# Patient Record
Sex: FEMALE | Race: WHITE | Employment: OTHER | URBAN - METROPOLITAN AREA
[De-identification: names, ages, dates, MRNs, and addresses within clinical notes are randomized per-mention and may not be internally consistent; named-entity substitution may affect disease eponyms.]

---

## 2017-07-19 ENCOUNTER — HOSPITAL ENCOUNTER (OUTPATIENT)
Dept: MAMMOGRAPHY | Age: 72
Discharge: HOME | End: 2017-07-19
Attending: FAMILY MEDICINE
Payer: MEDICARE

## 2017-07-19 DIAGNOSIS — Z12.31 VISIT FOR SCREENING MAMMOGRAM: ICD-10-CM

## 2017-07-19 PROCEDURE — 77067 SCR MAMMO BI INCL CAD: CPT

## 2018-07-24 ENCOUNTER — HOSPITAL ENCOUNTER (OUTPATIENT)
Dept: MAMMOGRAPHY | Age: 73
Discharge: HOME | End: 2018-07-24
Attending: FAMILY MEDICINE
Payer: MEDICARE

## 2018-07-24 DIAGNOSIS — Z12.39 SCREENING BREAST EXAMINATION: ICD-10-CM

## 2018-07-24 PROCEDURE — 77067 SCR MAMMO BI INCL CAD: CPT

## 2019-08-05 ENCOUNTER — HOSPITAL ENCOUNTER (OUTPATIENT)
Dept: MAMMOGRAPHY | Age: 74
Discharge: HOME | End: 2019-08-05
Attending: FAMILY MEDICINE
Payer: MEDICARE

## 2019-08-05 DIAGNOSIS — Z12.39 BREAST SCREENING, UNSPECIFIED: ICD-10-CM

## 2019-08-05 PROCEDURE — 77067 SCR MAMMO BI INCL CAD: CPT

## 2020-08-06 ENCOUNTER — HOSPITAL ENCOUNTER (OUTPATIENT)
Dept: MAMMOGRAPHY | Age: 75
Discharge: HOME | End: 2020-08-06
Attending: FAMILY MEDICINE
Payer: MEDICARE

## 2020-08-06 DIAGNOSIS — Z12.31 VISIT FOR SCREENING MAMMOGRAM: ICD-10-CM

## 2020-08-06 PROCEDURE — 77067 SCR MAMMO BI INCL CAD: CPT

## 2021-07-19 ENCOUNTER — TRANSCRIBE ORDER (OUTPATIENT)
Dept: SCHEDULING | Age: 76
End: 2021-07-19

## 2021-07-19 DIAGNOSIS — Z12.31 VISIT FOR SCREENING MAMMOGRAM: Primary | ICD-10-CM

## 2021-08-12 ENCOUNTER — HOSPITAL ENCOUNTER (OUTPATIENT)
Dept: MAMMOGRAPHY | Age: 76
Discharge: HOME | End: 2021-08-12
Attending: FAMILY MEDICINE
Payer: MEDICARE

## 2021-08-12 DIAGNOSIS — Z12.31 VISIT FOR SCREENING MAMMOGRAM: ICD-10-CM

## 2021-08-12 PROCEDURE — 77067 SCR MAMMO BI INCL CAD: CPT

## 2022-07-18 ENCOUNTER — TRANSCRIBE ORDER (OUTPATIENT)
Dept: SCHEDULING | Age: 77
End: 2022-07-18

## 2022-07-18 DIAGNOSIS — Z12.31 ENCOUNTER FOR MAMMOGRAM TO ESTABLISH BASELINE MAMMOGRAM: Primary | ICD-10-CM

## 2022-08-15 ENCOUNTER — HOSPITAL ENCOUNTER (OUTPATIENT)
Dept: MAMMOGRAPHY | Age: 77
Discharge: HOME | End: 2022-08-15
Attending: FAMILY MEDICINE
Payer: MEDICARE

## 2022-08-15 DIAGNOSIS — Z12.31 ENCOUNTER FOR MAMMOGRAM TO ESTABLISH BASELINE MAMMOGRAM: ICD-10-CM

## 2022-08-15 PROCEDURE — 77067 SCR MAMMO BI INCL CAD: CPT

## 2023-08-10 ENCOUNTER — OFFICE VISIT (OUTPATIENT)
Age: 78
End: 2023-08-10
Payer: MEDICARE

## 2023-08-10 VITALS
HEIGHT: 63 IN | BODY MASS INDEX: 22.32 KG/M2 | OXYGEN SATURATION: 97 % | WEIGHT: 126 LBS | DIASTOLIC BLOOD PRESSURE: 76 MMHG | HEART RATE: 67 BPM | SYSTOLIC BLOOD PRESSURE: 138 MMHG

## 2023-08-10 DIAGNOSIS — E07.9 THYROID DISORDER: ICD-10-CM

## 2023-08-10 DIAGNOSIS — R41.3 COMPLAINTS OF MEMORY DISTURBANCE: Primary | ICD-10-CM

## 2023-08-10 PROCEDURE — 1090F PRES/ABSN URINE INCON ASSESS: CPT | Performed by: PSYCHIATRY & NEUROLOGY

## 2023-08-10 PROCEDURE — G8427 DOCREV CUR MEDS BY ELIG CLIN: HCPCS | Performed by: PSYCHIATRY & NEUROLOGY

## 2023-08-10 PROCEDURE — 4004F PT TOBACCO SCREEN RCVD TLK: CPT | Performed by: PSYCHIATRY & NEUROLOGY

## 2023-08-10 PROCEDURE — G8400 PT W/DXA NO RESULTS DOC: HCPCS | Performed by: PSYCHIATRY & NEUROLOGY

## 2023-08-10 PROCEDURE — 99204 OFFICE O/P NEW MOD 45 MIN: CPT | Performed by: PSYCHIATRY & NEUROLOGY

## 2023-08-10 PROCEDURE — 1123F ACP DISCUSS/DSCN MKR DOCD: CPT | Performed by: PSYCHIATRY & NEUROLOGY

## 2023-08-10 PROCEDURE — G8420 CALC BMI NORM PARAMETERS: HCPCS | Performed by: PSYCHIATRY & NEUROLOGY

## 2023-08-10 RX ORDER — SIMVASTATIN 20 MG
20 TABLET ORAL DAILY
COMMUNITY
Start: 2023-06-19

## 2023-08-10 RX ORDER — BUPROPION HYDROCHLORIDE 150 MG/1
TABLET ORAL
COMMUNITY
Start: 2023-07-20

## 2023-08-10 RX ORDER — FOLIC ACID 1 MG/1
1 TABLET ORAL DAILY
COMMUNITY
Start: 2019-09-14

## 2023-08-10 RX ORDER — LEVOTHYROXINE SODIUM 0.07 MG/1
TABLET ORAL
COMMUNITY
Start: 2023-05-02

## 2023-08-10 RX ORDER — METOPROLOL SUCCINATE 25 MG/1
TABLET, EXTENDED RELEASE ORAL
COMMUNITY
Start: 2023-07-20

## 2023-08-10 ASSESSMENT — PATIENT HEALTH QUESTIONNAIRE - PHQ9
SUM OF ALL RESPONSES TO PHQ QUESTIONS 1-9: 2
SUM OF ALL RESPONSES TO PHQ QUESTIONS 1-9: 2
2. FEELING DOWN, DEPRESSED OR HOPELESS: 1
SUM OF ALL RESPONSES TO PHQ QUESTIONS 1-9: 2
1. LITTLE INTEREST OR PLEASURE IN DOING THINGS: 1
SUM OF ALL RESPONSES TO PHQ QUESTIONS 1-9: 2
SUM OF ALL RESPONSES TO PHQ9 QUESTIONS 1 & 2: 2

## 2023-08-10 NOTE — PROGRESS NOTES
NEUROLOGY  NEW PATIENT EVALUATION/CONSULTATION       PATIENT NAME: Isabella Martinez    MRN: [id number]    REASON FOR CONSULTATION: Memory impairment    08/10/23      Previous records (physician notes, laboratory reports, and radiology reports) and imaging studies were reviewed and summarized. My recommendations will be communicated back to the patient's physician(s) via electronic medical record and/or by [address] mail. HISTORY OF PRESENT ILLNESS:  Isabella Martinez is a 69 y.o.  female presenting for evaluation of memory impairment. Onset and progression: 6 months    Neuropsychiatric symptoms     Problems with judgment:No   Reduced interest in hobbies/activities: No   Repeats questions, stories, or statements: No    Trouble recalling people's names: Yes   Trouble learning how to use a tool or appliance: No   Forgetting the correct month or year: No   Difficulty handling financial affairs (bill-paying, taxes): No   Difficulty remembering appointments:No    Memory: Difficulty with names, eventually comes back to her. Reports falling victim to a scam previously. Language: Occasional word finding difficulty   Change in personality:No  Change in eating habits: Decreased appetite, not losing weight  Physical changes: Denies  Depressive symptoms: None  Hallucinations/Delusions: None    Ability to function:  Driving: Yes, no difficulty with navigation  Finances:Handles independently, no issues  Cooking: Yes, no issues  Manages own medication: Yes, no issues, uses a pill box  Residing: Alone    Prior work-up: TSH 1/9/23 0.273 (L), total T3 0.62 (L)- Synthroid recently adjusted    Prior treatments: None      PAST MEDICAL HISTORY:  Past Medical History:   Diagnosis Date    Hypertension     Menopause     LMP-late 45s? Thyroid disease        PAST SURGICAL HISTORY:  History reviewed. No pertinent surgical history.     FAMILY HISTORY:   Family History   Problem Relation Age of Onset    Heart Failure Father

## 2023-08-11 LAB — VIT B12 SERPL-MCNC: 412 PG/ML (ref 232–1245)

## 2023-08-14 ENCOUNTER — TELEPHONE (OUTPATIENT)
Age: 78
End: 2023-08-14

## 2023-08-14 NOTE — TELEPHONE ENCOUNTER
Called patient. Informed her that the doctor reviewed her b12 results, \"Labs reviewed without significant abnormalities. \"

## 2023-11-09 ENCOUNTER — HOSPITAL ENCOUNTER (OUTPATIENT)
Facility: HOSPITAL | Age: 78
Discharge: HOME | End: 2023-11-09
Payer: MEDICARE

## 2023-11-09 VITALS — HEIGHT: 63 IN | WEIGHT: 130 LBS | BODY MASS INDEX: 23.04 KG/M2

## 2023-11-09 DIAGNOSIS — Z12.31 SCREENING MAMMOGRAM FOR HIGH-RISK PATIENT: ICD-10-CM

## 2023-11-09 PROCEDURE — 77067 SCR MAMMO BI INCL CAD: CPT

## 2024-01-14 ENCOUNTER — HOSPITAL ENCOUNTER (INPATIENT)
Facility: HOSPITAL | Age: 79
LOS: 6 days | Discharge: NURSING, REHAB OR LONG-TERM CARE | End: 2024-01-20
Attending: EMERGENCY MEDICINE | Admitting: FAMILY MEDICINE
Payer: MEDICARE

## 2024-01-14 ENCOUNTER — APPOINTMENT (OUTPATIENT)
Facility: HOSPITAL | Age: 79
End: 2024-01-14
Payer: MEDICARE

## 2024-01-14 DIAGNOSIS — I50.9 NEW ONSET OF CONGESTIVE HEART FAILURE (HCC): ICD-10-CM

## 2024-01-14 DIAGNOSIS — I50.42 HEART FAILURE, SYSTOLIC AND DIASTOLIC, CHRONIC (HCC): ICD-10-CM

## 2024-01-14 DIAGNOSIS — I50.43 ACUTE ON CHRONIC COMBINED SYSTOLIC (CONGESTIVE) AND DIASTOLIC (CONGESTIVE) HEART FAILURE (HCC): Primary | ICD-10-CM

## 2024-01-14 PROBLEM — E03.9 ACQUIRED HYPOTHYROIDISM: Chronic | Status: ACTIVE | Noted: 2024-01-14

## 2024-01-14 PROBLEM — R79.89 ELEVATED TROPONIN: Status: ACTIVE | Noted: 2024-01-14

## 2024-01-14 PROBLEM — I10 ESSENTIAL (PRIMARY) HYPERTENSION: Chronic | Status: ACTIVE | Noted: 2024-01-14

## 2024-01-14 PROBLEM — R74.01 TRANSAMINITIS: Status: ACTIVE | Noted: 2024-01-14

## 2024-01-14 PROBLEM — R79.89 ELEVATED BRAIN NATRIURETIC PEPTIDE (BNP) LEVEL: Status: ACTIVE | Noted: 2024-01-14

## 2024-01-14 PROBLEM — R79.89 ELEVATED D-DIMER: Status: ACTIVE | Noted: 2024-01-14

## 2024-01-14 LAB
ALBUMIN SERPL-MCNC: 3.8 G/DL (ref 3.5–5)
ALBUMIN/GLOB SERPL: 1.2 (ref 1.1–2.2)
ALP SERPL-CCNC: 73 U/L (ref 45–117)
ALT SERPL-CCNC: 164 U/L (ref 12–78)
ANION GAP SERPL CALC-SCNC: 9 MMOL/L (ref 5–15)
AST SERPL-CCNC: 254 U/L (ref 15–37)
BASOPHILS # BLD: 0 K/UL (ref 0–0.1)
BASOPHILS NFR BLD: 1 % (ref 0–1)
BILIRUB SERPL-MCNC: 1.6 MG/DL (ref 0.2–1)
BUN SERPL-MCNC: 22 MG/DL (ref 6–20)
BUN/CREAT SERPL: 14 (ref 12–20)
CALCIUM SERPL-MCNC: 9 MG/DL (ref 8.5–10.1)
CHLORIDE SERPL-SCNC: 99 MMOL/L (ref 97–108)
CO2 SERPL-SCNC: 24 MMOL/L (ref 21–32)
COMMENT:: NORMAL
CREAT SERPL-MCNC: 1.6 MG/DL (ref 0.55–1.02)
D DIMER PPP FEU-MCNC: 3.42 MG/L FEU (ref 0–0.65)
DIFFERENTIAL METHOD BLD: ABNORMAL
EOSINOPHIL # BLD: 0 K/UL (ref 0–0.4)
EOSINOPHIL NFR BLD: 0 % (ref 0–7)
ERYTHROCYTE [DISTWIDTH] IN BLOOD BY AUTOMATED COUNT: 14 % (ref 11.5–14.5)
FLUAV AG NPH QL IA: NEGATIVE
FLUBV AG NOSE QL IA: NEGATIVE
GLOBULIN SER CALC-MCNC: 3.1 G/DL (ref 2–4)
GLUCOSE SERPL-MCNC: 133 MG/DL (ref 65–100)
HCT VFR BLD AUTO: 49.8 % (ref 35–47)
HGB BLD-MCNC: 16.2 G/DL (ref 11.5–16)
IMM GRANULOCYTES # BLD AUTO: 0 K/UL (ref 0–0.04)
IMM GRANULOCYTES NFR BLD AUTO: 0 % (ref 0–0.5)
LYMPHOCYTES # BLD: 1.4 K/UL (ref 0.8–3.5)
LYMPHOCYTES NFR BLD: 19 % (ref 12–49)
MCH RBC QN AUTO: 29.7 PG (ref 26–34)
MCHC RBC AUTO-ENTMCNC: 32.5 G/DL (ref 30–36.5)
MCV RBC AUTO: 91.2 FL (ref 80–99)
MONOCYTES # BLD: 0.5 K/UL (ref 0–1)
MONOCYTES NFR BLD: 7 % (ref 5–13)
NEUTS SEG # BLD: 5.4 K/UL (ref 1.8–8)
NEUTS SEG NFR BLD: 73 % (ref 32–75)
NRBC # BLD: 0 K/UL (ref 0–0.01)
NRBC BLD-RTO: 0 PER 100 WBC
NT PRO BNP: ABNORMAL PG/ML
PLATELET # BLD AUTO: 255 K/UL (ref 150–400)
PMV BLD AUTO: 10.6 FL (ref 8.9–12.9)
POTASSIUM SERPL-SCNC: 3.8 MMOL/L (ref 3.5–5.1)
PROT SERPL-MCNC: 6.9 G/DL (ref 6.4–8.2)
RBC # BLD AUTO: 5.46 M/UL (ref 3.8–5.2)
SARS-COV-2 RDRP RESP QL NAA+PROBE: NOT DETECTED
SODIUM SERPL-SCNC: 132 MMOL/L (ref 136–145)
SOURCE: NORMAL
SPECIMEN HOLD: NORMAL
TROPONIN I SERPL HS-MCNC: 147 NG/L (ref 0–51)
TROPONIN I SERPL HS-MCNC: 177 NG/L (ref 0–51)
WBC # BLD AUTO: 7.3 K/UL (ref 3.6–11)

## 2024-01-14 PROCEDURE — 85379 FIBRIN DEGRADATION QUANT: CPT

## 2024-01-14 PROCEDURE — 85025 COMPLETE CBC W/AUTO DIFF WBC: CPT

## 2024-01-14 PROCEDURE — 6360000004 HC RX CONTRAST MEDICATION

## 2024-01-14 PROCEDURE — 80053 COMPREHEN METABOLIC PANEL: CPT

## 2024-01-14 PROCEDURE — 36415 COLL VENOUS BLD VENIPUNCTURE: CPT

## 2024-01-14 PROCEDURE — 87635 SARS-COV-2 COVID-19 AMP PRB: CPT

## 2024-01-14 PROCEDURE — 71045 X-RAY EXAM CHEST 1 VIEW: CPT

## 2024-01-14 PROCEDURE — 99285 EMERGENCY DEPT VISIT HI MDM: CPT

## 2024-01-14 PROCEDURE — 87804 INFLUENZA ASSAY W/OPTIC: CPT

## 2024-01-14 PROCEDURE — 84439 ASSAY OF FREE THYROXINE: CPT

## 2024-01-14 PROCEDURE — 84484 ASSAY OF TROPONIN QUANT: CPT

## 2024-01-14 PROCEDURE — 2060000000 HC ICU INTERMEDIATE R&B

## 2024-01-14 PROCEDURE — 6360000002 HC RX W HCPCS: Performed by: FAMILY MEDICINE

## 2024-01-14 PROCEDURE — 71275 CT ANGIOGRAPHY CHEST: CPT

## 2024-01-14 PROCEDURE — 2580000003 HC RX 258: Performed by: FAMILY MEDICINE

## 2024-01-14 PROCEDURE — 83880 ASSAY OF NATRIURETIC PEPTIDE: CPT

## 2024-01-14 PROCEDURE — 93005 ELECTROCARDIOGRAM TRACING: CPT | Performed by: HOSPITALIST

## 2024-01-14 PROCEDURE — 84443 ASSAY THYROID STIM HORMONE: CPT

## 2024-01-14 PROCEDURE — 76705 ECHO EXAM OF ABDOMEN: CPT

## 2024-01-14 RX ORDER — FUROSEMIDE 10 MG/ML
40 INJECTION INTRAMUSCULAR; INTRAVENOUS 2 TIMES DAILY
Status: DISCONTINUED | OUTPATIENT
Start: 2024-01-14 | End: 2024-01-16

## 2024-01-14 RX ORDER — SODIUM CHLORIDE 9 MG/ML
INJECTION, SOLUTION INTRAVENOUS PRN
Status: DISCONTINUED | OUTPATIENT
Start: 2024-01-14 | End: 2024-01-20 | Stop reason: HOSPADM

## 2024-01-14 RX ORDER — FOLIC ACID 1 MG/1
1 TABLET ORAL DAILY
Status: DISCONTINUED | OUTPATIENT
Start: 2024-01-15 | End: 2024-01-20 | Stop reason: HOSPADM

## 2024-01-14 RX ORDER — ATORVASTATIN CALCIUM 20 MG/1
20 TABLET, FILM COATED ORAL DAILY
Status: DISCONTINUED | OUTPATIENT
Start: 2024-01-14 | End: 2024-01-20 | Stop reason: HOSPADM

## 2024-01-14 RX ORDER — ACETAMINOPHEN 325 MG/1
650 TABLET ORAL EVERY 6 HOURS PRN
Status: DISCONTINUED | OUTPATIENT
Start: 2024-01-14 | End: 2024-01-20 | Stop reason: HOSPADM

## 2024-01-14 RX ORDER — VITAMIN B COMPLEX
2000 TABLET ORAL DAILY
Status: DISCONTINUED | OUTPATIENT
Start: 2024-01-15 | End: 2024-01-20 | Stop reason: HOSPADM

## 2024-01-14 RX ORDER — LISINOPRIL 5 MG/1
5 TABLET ORAL DAILY
Status: DISCONTINUED | OUTPATIENT
Start: 2024-01-14 | End: 2024-01-15

## 2024-01-14 RX ORDER — SODIUM CHLORIDE 0.9 % (FLUSH) 0.9 %
5-40 SYRINGE (ML) INJECTION PRN
Status: DISCONTINUED | OUTPATIENT
Start: 2024-01-14 | End: 2024-01-20 | Stop reason: HOSPADM

## 2024-01-14 RX ORDER — BUPROPION HYDROCHLORIDE 150 MG/1
150 TABLET, EXTENDED RELEASE ORAL DAILY
Status: DISCONTINUED | OUTPATIENT
Start: 2024-01-15 | End: 2024-01-20 | Stop reason: HOSPADM

## 2024-01-14 RX ORDER — POTASSIUM CHLORIDE 7.45 MG/ML
10 INJECTION INTRAVENOUS PRN
Status: DISCONTINUED | OUTPATIENT
Start: 2024-01-14 | End: 2024-01-20 | Stop reason: HOSPADM

## 2024-01-14 RX ORDER — ONDANSETRON 4 MG/1
4 TABLET, ORALLY DISINTEGRATING ORAL EVERY 8 HOURS PRN
Status: DISCONTINUED | OUTPATIENT
Start: 2024-01-14 | End: 2024-01-20 | Stop reason: HOSPADM

## 2024-01-14 RX ORDER — ENOXAPARIN SODIUM 100 MG/ML
30 INJECTION SUBCUTANEOUS EVERY 24 HOURS
Status: DISCONTINUED | OUTPATIENT
Start: 2024-01-14 | End: 2024-01-15

## 2024-01-14 RX ORDER — METOPROLOL SUCCINATE 25 MG/1
25 TABLET, EXTENDED RELEASE ORAL DAILY
Status: DISCONTINUED | OUTPATIENT
Start: 2024-01-15 | End: 2024-01-19

## 2024-01-14 RX ORDER — FUROSEMIDE 10 MG/ML
40 INJECTION INTRAMUSCULAR; INTRAVENOUS ONCE
Status: DISCONTINUED | OUTPATIENT
Start: 2024-01-14 | End: 2024-01-18

## 2024-01-14 RX ORDER — ONDANSETRON 2 MG/ML
4 INJECTION INTRAMUSCULAR; INTRAVENOUS EVERY 6 HOURS PRN
Status: DISCONTINUED | OUTPATIENT
Start: 2024-01-14 | End: 2024-01-20 | Stop reason: HOSPADM

## 2024-01-14 RX ORDER — SODIUM CHLORIDE 0.9 % (FLUSH) 0.9 %
5-40 SYRINGE (ML) INJECTION EVERY 12 HOURS SCHEDULED
Status: DISCONTINUED | OUTPATIENT
Start: 2024-01-14 | End: 2024-01-20 | Stop reason: HOSPADM

## 2024-01-14 RX ORDER — LEVOTHYROXINE SODIUM 0.07 MG/1
75 TABLET ORAL DAILY
Status: DISCONTINUED | OUTPATIENT
Start: 2024-01-15 | End: 2024-01-20 | Stop reason: HOSPADM

## 2024-01-14 RX ORDER — ACETAMINOPHEN 650 MG/1
650 SUPPOSITORY RECTAL EVERY 6 HOURS PRN
Status: DISCONTINUED | OUTPATIENT
Start: 2024-01-14 | End: 2024-01-20 | Stop reason: HOSPADM

## 2024-01-14 RX ORDER — POLYETHYLENE GLYCOL 3350 17 G/17G
17 POWDER, FOR SOLUTION ORAL DAILY PRN
Status: DISCONTINUED | OUTPATIENT
Start: 2024-01-14 | End: 2024-01-20 | Stop reason: HOSPADM

## 2024-01-14 RX ORDER — MAGNESIUM SULFATE IN WATER 40 MG/ML
2000 INJECTION, SOLUTION INTRAVENOUS PRN
Status: DISCONTINUED | OUTPATIENT
Start: 2024-01-14 | End: 2024-01-20 | Stop reason: HOSPADM

## 2024-01-14 RX ADMIN — ENOXAPARIN SODIUM 30 MG: 100 INJECTION SUBCUTANEOUS at 18:50

## 2024-01-14 RX ADMIN — SODIUM CHLORIDE, PRESERVATIVE FREE 10 ML: 5 INJECTION INTRAVENOUS at 21:43

## 2024-01-14 RX ADMIN — IOPAMIDOL: 755 INJECTION, SOLUTION INTRAVENOUS at 15:43

## 2024-01-14 RX ADMIN — FUROSEMIDE 40 MG: 10 INJECTION, SOLUTION INTRAMUSCULAR; INTRAVENOUS at 18:54

## 2024-01-14 ASSESSMENT — LIFESTYLE VARIABLES
HOW OFTEN DO YOU HAVE A DRINK CONTAINING ALCOHOL: NEVER
HOW MANY STANDARD DRINKS CONTAINING ALCOHOL DO YOU HAVE ON A TYPICAL DAY: PATIENT DOES NOT DRINK

## 2024-01-14 ASSESSMENT — PAIN - FUNCTIONAL ASSESSMENT: PAIN_FUNCTIONAL_ASSESSMENT: NONE - DENIES PAIN

## 2024-01-14 NOTE — ED PROVIDER NOTES
Cardiovascular:      Rate and Rhythm: Normal rate and regular rhythm.   Pulmonary:      Effort: Pulmonary effort is normal. No respiratory distress.      Breath sounds: Normal breath sounds.      Comments: 95% on room air  Abdominal:      General: Abdomen is flat. There is no distension.      Palpations: Abdomen is soft.   Musculoskeletal:         General: No swelling or tenderness. Normal range of motion.      Cervical back: Normal range of motion and neck supple.   Skin:     General: Skin is warm and dry.   Neurological:      General: No focal deficit present.      Mental Status: She is alert and oriented to person, place, and time. Mental status is at baseline.   Psychiatric:         Mood and Affect: Mood normal.             EMERGENCY DEPARTMENT COURSE and DIFFERENTIAL DIAGNOSIS/MDM:   Vitals:    Vitals:    01/14/24 1200 01/14/24 1230 01/14/24 1400 01/14/24 1430   BP: (!) 134/123 118/82 (!) 140/122 (!) 112/93   Pulse: 82 78 75 74   Resp: 28 17 21 20   Temp:       TempSrc:       SpO2: 91% 91% 95% 99%   Weight:       Height:             Medical Decision Making  Amount and/or Complexity of Data Reviewed  Labs: ordered.  Radiology: ordered.  ECG/medicine tests: ordered and independent interpretation performed.     Details: EKG at 11:17 AM, normal sinus rhythm with first-degree AV block T wave inversion in lateral leads, no STEMI    Risk  Prescription drug management.  Decision regarding hospitalization.            REASSESSMENT     ED Course as of 01/14/24 1625   Sun Jan 14, 2024   1400 IMPRESSION:  Cardiomegaly. Small bilateral pleural effusions with bibasilar  atelectasis.   [BN]   1624 Patient evaluated for dyspnea on exertion.  Symptoms all correlate with new onset congestive heart failure.  She had a CT of her chest to evaluate for possible pulmonary embolism.  The study was negative.  She will be ordered IV Lasix.  I have discussed her case with the inpatient hospitalist team who will admit her.  She is not  hypoxic and is comfortable at rest.  No history of prior chest pain, do not suspect acute coronary syndrome. [BN]      ED Course User Index  [BN] Juan Antonio Juarez MD         CONSULTS:  IP CONSULT TO CARDIOLOGY    PROCEDURES:     Procedures            (Please note that portions of this note were completed with a voice recognition program.  Efforts were made to edit the dictations but occasionally words are mis-transcribed.)    Juan Antonio Juarez MD (electronically signed)  Emergency Attending Physician              Juan Antonio Juarez MD  01/14/24 7081

## 2024-01-14 NOTE — ED NOTES
Spoke with cardiologist  over the phone. Cards okay with plan set by hospitalist, will see patient in the AM.

## 2024-01-14 NOTE — ED TRIAGE NOTES
Pt arrived via [facility] for c/o of weakness. Pt went to drs office this AM for weakness. EKG performed by Drs office and EMS which showed Afib. No h/o afib. Pt is alert and oriented.

## 2024-01-14 NOTE — H&P
History and Physical    Date of Service:  1/14/2024  Primary Care Provider: Heidy Perez MD  Source of information: The patient, Chart review, and Friend/caregiver    Chief Complaint: Fatigue      History of Presenting Illness:   Frannie Blake is a 78 y.o. female PMH HTN, hyperlipidemia, hypothyroid on synthroid who was sent in by PCP Heidy Perez due to ongoing symptoms past 3-4 weeks, mild edema lower extremities which she attributed to trazodone which she is now off of with some improvement (had overt rash allergic rxn) as well as worsening fatigue with no strength to walk up stairs also worsening over past 3-4 weeks. She states after 2 steps she will need to stop and rest.  States \"I just have no stamina\" This am she states she was completely breathless after 3 steos so contacted PCP and she rec she proceed to ED for eval. She also notes a couple weeks ago she was having trouble sleeping, but denies that it was do to PND and states she just couldn't sleep.  This was the reason for the trazooone, but she states its better since stopping the trazodone.  She denies chest pain, headaches, vision changes, shortness of breath at rest, nausea, vomiting, fevers, sick contacts, every time I ask her a ROS question she replies \"I just have no stamina\". She denies h/o tobacco or etoh use and denies any h/o illicit drugs as well.      REVIEW OF SYSTEMS:  Pertinent items are noted in the History of Present Illness.     Past Medical History:   Diagnosis Date    Hypertension     Menopause     LMP-late 40s?    Thyroid disease       History reviewed. No pertinent surgical history.  Prior to Admission medications    Medication Sig Start Date End Date Taking? Authorizing Provider   buPROPion (WELLBUTRIN XL) 150 MG extended release tablet TAKE 1 TABLET BY MOUTH EVERY DAY IN THE MORNING FOR 90 DAYS 7/20/23  Yes Provider, MD Adina   vitamin D (CHOLECALCIFEROL) 125 MCG (5000 UT) CAPS capsule 1 tablet  within normal limits       [unfilled]    IMAGING:   CTA CHEST W WO CONTRAST PE Eval   Final Result      1. No evidence of pulmonary embolism.   2. Moderate bilateral pleural effusions with bibasilar atelectasis.   3. Interstitial pulmonary edema.   4. Cardiomegaly.   5. Chronic pulmonary arterial hypertension.      Constellation of findings is consistent with a CHF exacerbation.         XR CHEST PORTABLE   Final Result   Cardiomegaly. Small bilateral pleural effusions with bibasilar   atelectasis.           ECG/ECHO:         Notes reviewed from all clinical/nonclinical/nursing services involved in patient's clinical care. Care coordination discussions were held with appropriate clinical/nonclinical/ nursing providers based on care coordination needs.     Assessment:   Given the patient's current clinical presentation, there is a high level of concern for decompensation if discharged from the emergency department. Complex decision making was performed, which includes reviewing the patient's available past medical records, laboratory results, and imaging studies.    Principal Problem:    Acute on chronic combined systolic (congestive) and diastolic (congestive) heart failure (HCC)  Active Problems:    Acquired hypothyroidism    Essential (primary) hypertension    Elevated brain natriuretic peptide (BNP) level    Elevated troponin    Elevated d-dimer    Transaminitis  Resolved Problems:    * No resolved hospital problems. *      Plan:     # Acute on chronic (suspected chronic diastolic and systolic due to CTA with increased pulmonary artery vasculature as well as bilateral pleural effusions noted in addition to transaminitis suggestive of congestive process)  - Lasix IV bid  - Strict input and output  - Daily weights  - Cardiology to see  - Echo pending  - Stable for telemetry  - Will cycle enzymes as troponin Is elevated  - Continue beta blocker  - Add ACEi once creat is determined to be stable  - Check TFTs  -

## 2024-01-15 ENCOUNTER — APPOINTMENT (OUTPATIENT)
Facility: HOSPITAL | Age: 79
End: 2024-01-15
Attending: FAMILY MEDICINE
Payer: MEDICARE

## 2024-01-15 ENCOUNTER — APPOINTMENT (OUTPATIENT)
Facility: HOSPITAL | Age: 79
End: 2024-01-15
Payer: MEDICARE

## 2024-01-15 LAB
ALBUMIN SERPL-MCNC: 3.1 G/DL (ref 3.5–5)
ALBUMIN/GLOB SERPL: 1.2 (ref 1.1–2.2)
ALP SERPL-CCNC: 65 U/L (ref 45–117)
ALT SERPL-CCNC: 138 U/L (ref 12–78)
ANION GAP SERPL CALC-SCNC: 10 MMOL/L (ref 5–15)
AST SERPL-CCNC: 186 U/L (ref 15–37)
BILIRUB DIRECT SERPL-MCNC: 0.5 MG/DL (ref 0–0.2)
BILIRUB SERPL-MCNC: 1.3 MG/DL (ref 0.2–1)
BUN SERPL-MCNC: 20 MG/DL (ref 6–20)
BUN/CREAT SERPL: 14 (ref 12–20)
CALCIUM SERPL-MCNC: 9.3 MG/DL (ref 8.5–10.1)
CHLORIDE SERPL-SCNC: 101 MMOL/L (ref 97–108)
CHOLEST SERPL-MCNC: 118 MG/DL
CO2 SERPL-SCNC: 25 MMOL/L (ref 21–32)
COMMENT:: NORMAL
CREAT SERPL-MCNC: 1.41 MG/DL (ref 0.55–1.02)
ECHO AV PEAK GRADIENT: 3 MMHG
ECHO AV PEAK VELOCITY: 0.9 M/S
ECHO AV VELOCITY RATIO: 0.78
ECHO EST RA PRESSURE: 3 MMHG
ECHO LA VOL A-L A4C: 46 ML (ref 22–52)
ECHO LA VOL MOD A4C: 44 ML (ref 22–52)
ECHO LA VOLUME INDEX A-L A4C: 28 ML/M2 (ref 16–34)
ECHO LA VOLUME INDEX MOD A4C: 27 ML/M2 (ref 16–34)
ECHO LV E' LATERAL VELOCITY: 5 CM/S
ECHO LV E' SEPTAL VELOCITY: 3 CM/S
ECHO LV EJECTION FRACTION A2C: 36 %
ECHO LV EJECTION FRACTION A4C: 32 %
ECHO LV FRACTIONAL SHORTENING: 26 % (ref 28–44)
ECHO LV INTERNAL DIMENSION DIASTOLE INDEX: 2.59 CM/M2
ECHO LV INTERNAL DIMENSION DIASTOLIC: 4.2 CM (ref 3.9–5.3)
ECHO LV INTERNAL DIMENSION SYSTOLIC INDEX: 1.91 CM/M2
ECHO LV INTERNAL DIMENSION SYSTOLIC: 3.1 CM
ECHO LV IVSD: 0.8 CM (ref 0.6–0.9)
ECHO LV MASS 2D: 118.7 G (ref 67–162)
ECHO LV MASS INDEX 2D: 73.3 G/M2 (ref 43–95)
ECHO LV POSTERIOR WALL DIASTOLIC: 1 CM (ref 0.6–0.9)
ECHO LV RELATIVE WALL THICKNESS RATIO: 0.48
ECHO LVOT PEAK GRADIENT: 2 MMHG
ECHO LVOT PEAK VELOCITY: 0.7 M/S
ECHO MV A VELOCITY: 0.41 M/S
ECHO MV E VELOCITY: 0.59 M/S
ECHO MV E/A RATIO: 1.44
ECHO MV E/E' LATERAL: 11.8
ECHO MV E/E' RATIO (AVERAGED): 15.73
ECHO RIGHT VENTRICULAR SYSTOLIC PRESSURE (RVSP): 43 MMHG
ECHO RV FREE WALL PEAK S': 8 CM/S
ECHO RV INTERNAL DIMENSION: 4.3 CM
ECHO RV TAPSE: 1.8 CM (ref 1.7–?)
ECHO TV REGURGITANT MAX VELOCITY: 3.17 M/S
ECHO TV REGURGITANT PEAK GRADIENT: 40 MMHG
EKG ATRIAL RATE: 150 BPM
EKG ATRIAL RATE: 55 BPM
EKG ATRIAL RATE: 88 BPM
EKG DIAGNOSIS: NORMAL
EKG P AXIS: 49 DEGREES
EKG P-R INTERVAL: 226 MS
EKG Q-T INTERVAL: 326 MS
EKG Q-T INTERVAL: 330 MS
EKG Q-T INTERVAL: 396 MS
EKG QRS DURATION: 78 MS
EKG QRS DURATION: 84 MS
EKG QRS DURATION: 94 MS
EKG QTC CALCULATION (BAZETT): 479 MS
EKG QTC CALCULATION (BAZETT): 513 MS
EKG QTC CALCULATION (BAZETT): 521 MS
EKG R AXIS: -86 DEGREES
EKG R AXIS: -89 DEGREES
EKG R AXIS: 249 DEGREES
EKG T AXIS: 135 DEGREES
EKG T AXIS: 139 DEGREES
EKG T AXIS: 238 DEGREES
EKG VENTRICULAR RATE: 149 BPM
EKG VENTRICULAR RATE: 150 BPM
EKG VENTRICULAR RATE: 88 BPM
GLOBULIN SER CALC-MCNC: 2.6 G/DL (ref 2–4)
GLUCOSE SERPL-MCNC: 83 MG/DL (ref 65–100)
HDLC SERPL-MCNC: 26 MG/DL
HDLC SERPL: 4.5 (ref 0–5)
LDLC SERPL CALC-MCNC: 75.2 MG/DL (ref 0–100)
MAGNESIUM SERPL-MCNC: 1.8 MG/DL (ref 1.6–2.4)
POTASSIUM SERPL-SCNC: 3.4 MMOL/L (ref 3.5–5.1)
PROT SERPL-MCNC: 5.7 G/DL (ref 6.4–8.2)
SODIUM SERPL-SCNC: 136 MMOL/L (ref 136–145)
SPECIMEN HOLD: NORMAL
TRIGL SERPL-MCNC: 84 MG/DL
TSH SERPL DL<=0.05 MIU/L-ACNC: 3.62 UIU/ML (ref 0.36–3.74)
VLDLC SERPL CALC-MCNC: 16.8 MG/DL

## 2024-01-15 PROCEDURE — 83735 ASSAY OF MAGNESIUM: CPT

## 2024-01-15 PROCEDURE — 80061 LIPID PANEL: CPT

## 2024-01-15 PROCEDURE — 99223 1ST HOSP IP/OBS HIGH 75: CPT | Performed by: INTERNAL MEDICINE

## 2024-01-15 PROCEDURE — 93005 ELECTROCARDIOGRAM TRACING: CPT | Performed by: NURSE PRACTITIONER

## 2024-01-15 PROCEDURE — 51798 US URINE CAPACITY MEASURE: CPT

## 2024-01-15 PROCEDURE — 6360000002 HC RX W HCPCS: Performed by: NURSE PRACTITIONER

## 2024-01-15 PROCEDURE — 93306 TTE W/DOPPLER COMPLETE: CPT

## 2024-01-15 PROCEDURE — 93306 TTE W/DOPPLER COMPLETE: CPT | Performed by: SPECIALIST

## 2024-01-15 PROCEDURE — 6360000002 HC RX W HCPCS: Performed by: FAMILY MEDICINE

## 2024-01-15 PROCEDURE — 80076 HEPATIC FUNCTION PANEL: CPT

## 2024-01-15 PROCEDURE — 6370000000 HC RX 637 (ALT 250 FOR IP): Performed by: FAMILY MEDICINE

## 2024-01-15 PROCEDURE — 84443 ASSAY THYROID STIM HORMONE: CPT

## 2024-01-15 PROCEDURE — 2060000000 HC ICU INTERMEDIATE R&B

## 2024-01-15 PROCEDURE — 80048 BASIC METABOLIC PNL TOTAL CA: CPT

## 2024-01-15 PROCEDURE — 36415 COLL VENOUS BLD VENIPUNCTURE: CPT

## 2024-01-15 PROCEDURE — 70450 CT HEAD/BRAIN W/O DYE: CPT

## 2024-01-15 PROCEDURE — 2580000003 HC RX 258: Performed by: FAMILY MEDICINE

## 2024-01-15 PROCEDURE — 2580000003 HC RX 258: Performed by: NURSE PRACTITIONER

## 2024-01-15 PROCEDURE — 99223 1ST HOSP IP/OBS HIGH 75: CPT | Performed by: SPECIALIST

## 2024-01-15 PROCEDURE — 6370000000 HC RX 637 (ALT 250 FOR IP): Performed by: NURSE PRACTITIONER

## 2024-01-15 RX ORDER — DIGOXIN 0.25 MG/ML
250 INJECTION INTRAMUSCULAR; INTRAVENOUS ONCE
Status: COMPLETED | OUTPATIENT
Start: 2024-01-15 | End: 2024-01-15

## 2024-01-15 RX ORDER — MAGNESIUM SULFATE 1 G/100ML
1000 INJECTION INTRAVENOUS ONCE
Status: COMPLETED | OUTPATIENT
Start: 2024-01-15 | End: 2024-01-15

## 2024-01-15 RX ORDER — POTASSIUM CHLORIDE 750 MG/1
10 TABLET, FILM COATED, EXTENDED RELEASE ORAL ONCE
Status: COMPLETED | OUTPATIENT
Start: 2024-01-15 | End: 2024-01-15

## 2024-01-15 RX ORDER — DIGOXIN 0.25 MG/ML
250 INJECTION INTRAMUSCULAR; INTRAVENOUS ONCE
Status: DISCONTINUED | OUTPATIENT
Start: 2024-01-15 | End: 2024-01-15

## 2024-01-15 RX ORDER — ENOXAPARIN SODIUM 100 MG/ML
1 INJECTION SUBCUTANEOUS EVERY 24 HOURS
Status: DISCONTINUED | OUTPATIENT
Start: 2024-01-15 | End: 2024-01-16

## 2024-01-15 RX ORDER — POTASSIUM CHLORIDE 750 MG/1
20 TABLET, FILM COATED, EXTENDED RELEASE ORAL
Status: COMPLETED | OUTPATIENT
Start: 2024-01-15 | End: 2024-01-15

## 2024-01-15 RX ADMIN — POTASSIUM CHLORIDE 20 MEQ: 750 TABLET, FILM COATED, EXTENDED RELEASE ORAL at 12:11

## 2024-01-15 RX ADMIN — FUROSEMIDE 40 MG: 10 INJECTION, SOLUTION INTRAMUSCULAR; INTRAVENOUS at 17:54

## 2024-01-15 RX ADMIN — SODIUM CHLORIDE, PRESERVATIVE FREE 10 ML: 5 INJECTION INTRAVENOUS at 09:35

## 2024-01-15 RX ADMIN — ATORVASTATIN CALCIUM 20 MG: 20 TABLET, FILM COATED ORAL at 09:34

## 2024-01-15 RX ADMIN — Medication 2000 UNITS: at 09:33

## 2024-01-15 RX ADMIN — AMIODARONE HYDROCHLORIDE 1 MG/MIN: 50 INJECTION, SOLUTION INTRAVENOUS at 14:11

## 2024-01-15 RX ADMIN — ENOXAPARIN SODIUM 60 MG: 100 INJECTION SUBCUTANEOUS at 17:54

## 2024-01-15 RX ADMIN — FOLIC ACID 1 MG: 1 TABLET ORAL at 09:34

## 2024-01-15 RX ADMIN — FUROSEMIDE 40 MG: 10 INJECTION, SOLUTION INTRAMUSCULAR; INTRAVENOUS at 09:34

## 2024-01-15 RX ADMIN — LEVOTHYROXINE SODIUM 75 MCG: 0.07 TABLET ORAL at 06:57

## 2024-01-15 RX ADMIN — METOPROLOL SUCCINATE 25 MG: 25 TABLET, EXTENDED RELEASE ORAL at 09:34

## 2024-01-15 RX ADMIN — BUPROPION HYDROCHLORIDE 150 MG: 150 TABLET, FILM COATED, EXTENDED RELEASE ORAL at 09:34

## 2024-01-15 RX ADMIN — DIGOXIN 250 MCG: 0.25 INJECTION INTRAMUSCULAR; INTRAVENOUS at 17:54

## 2024-01-15 RX ADMIN — AMIODARONE HYDROCHLORIDE 150 MG: 50 INJECTION, SOLUTION INTRAVENOUS at 15:17

## 2024-01-15 RX ADMIN — SODIUM CHLORIDE, PRESERVATIVE FREE 10 ML: 5 INJECTION INTRAVENOUS at 20:37

## 2024-01-15 RX ADMIN — POTASSIUM CHLORIDE 10 MEQ: 750 TABLET, FILM COATED, EXTENDED RELEASE ORAL at 17:54

## 2024-01-15 RX ADMIN — DIGOXIN 250 MCG: 0.25 INJECTION INTRAMUSCULAR; INTRAVENOUS at 14:12

## 2024-01-15 RX ADMIN — AMIODARONE HYDROCHLORIDE 0.5 MG/MIN: 50 INJECTION, SOLUTION INTRAVENOUS at 21:24

## 2024-01-15 RX ADMIN — MAGNESIUM SULFATE HEPTAHYDRATE 1000 MG: 1 INJECTION, SOLUTION INTRAVENOUS at 12:11

## 2024-01-15 NOTE — NURSE NAVIGATOR
HEART FAILURE NURSE NAVIGATOR NOTE  [facility]    Patient chart was reviewed by Heart Failure (HF) Nurse Navigators for compliance of prescribed treatment with guidelines directed medical therapy (GDMT) and HF database completed.     Please, review beneath recommendations for symptomatic patients with HF with Reduced Ejection Fraction ? 40% (HFrEF)* and patients whose LVEF improved > 40% (HFimpEF)* for your consideration when taking care of this patient.    Current Medical Therapy:    Name Frannie LARSEN 1945   LVEF   15/20 %   NYHA Functional Class   Documentation needed   ARNi/ACEi/ARB   Lisinopril   Beta-blocker  Toprol XL   Aldosterone Antagonist   Not prescribed see recommendation below.  Documentation needed   SGLT2 inhibitor  Not prescribed see recommendation below.  Documentation needed   Hydralazine/Isosorbide Dinitrate    Consulting Cardiologist:   See recommendation below severely reduced EF     Recommendations:    Please, add the following GDMT for HFrEF ? 40% [Class 1] or document in discharge summary/progress note why patient cannot take the medication:  ARNi/ACEi or ARB  Beta-blockers (carvedilol, sustained-release metoprolol succinate or bisoprolol)  Aldosterone antagonists GFR > 30 and K< 5  SGLT2 inhibitor  Hydralazine/Isosorbide dinitrate for  Americans with Class III/IV symptoms  Adjust diuretic dose at discharge if hospitalized for volume overload    Consider adding the following GDMT for HFrEF ? 40%, if appropriate [Class 2b]:  Ivabradine for patients on maximally tolerated beta-blocker dose in order to achieve HR 70-80bpm  Digoxin, goal level 0.5-0.9  Polyunsaturated fatty acids  Vericuguat  For patient with hyperkalemia while on RAASi > 5.5, consider adding potassium binders (patiromer, sodium zirconium cycosilicate)    Note: the following medications may be potentially harmful in heart failure [Class 3]:  Calcium channel blockers

## 2024-01-15 NOTE — PLAN OF CARE
Problem: Safety - Adult  Goal: Free from fall injury  Outcome: Progressing     Problem: Discharge Planning  Goal: Discharge to home or other facility with appropriate resources  Outcome: Progressing  Flowsheets (Taken 1/15/2024 0204)  Discharge to home or other facility with appropriate resources: Identify barriers to discharge with patient and caregiver     Problem: Chronic Conditions and Co-morbidities  Goal: Patient's chronic conditions and co-morbidity symptoms are monitored and maintained or improved  Outcome: Progressing

## 2024-01-15 NOTE — CONSULTS
ADVANCED HE[facility] FAILURE CENTER  [facility] in [place], [place]  Inpatient Progress Note      Patient name: Frannie Blake  Patient : 1945  Patient MRN: [id number]  Consulting MD: Nick De Oliveira MD  Date of service: 01/15/24    REASON FOR REFERRAL:  Management of heart failure    ASSESSMENT:  Frannie Blake is a 78 y.o. female admitted with acute systolic heart failure and new onset A-flutter with RVR. Presenting with NYHA class IV symptoms    RECOMMENDATIONS:  Medical Therapy for Heart Failure  Beta-blocker: Continue Toprol XL 25 mg daily. Will titrate as tolerated for rate control.   ACEi/ARB/ARNI: ACEi held, plan to transition to ARNi in 48 hours as BP tolerates  MRA: Will add Spironolactone as tolerated  SGLT2i: Plan to add Jardiance prior to discharge   Other HF drugs: Getting Digoxin Load    Volume Management  Continue Lasix 40 mg IV BID; goal net negative 2 liters per day (target weight 130 lbs)  Keep K>4 and Mg>2  Fluid restriction to 2000 cc daily, strict I/Os, daily standing weight    A-flutter with RVR  Amiodarone bolus and drip started  Anticoagulation with Lovenox 1 mg/kg q12 hours    Lipid Lowering Therapy  LDL 75.2  Continue Atorvastatin 20 mg daily    Laboratory and Imaging Studies  Echo reviewed  ECG reviewed  Labs: CMP, NT pro-BNP daily. Check vitamin D level, gammopathy profile, uric acid, A1c    Physical activity and education  Ambulate daily  Heart failure education by nurse navigator  Advanced care plan and document POA    Plans at or after hospital discharge  [facility] on discharge  Schedule sleep study  Follow-up in [facility]HF Clinic within 7 days from discharge      HISTORY OF PRESENT ILLNESS:  I had the pleasure of seeing Frannie Blake at the request of Dr. Mcguire for evaluation and management of heart failure.    Frannie Blake is a 78 y.o. female with a history of HTN, HLD and hypothyroidism. She has been generally healthy her entire life. She is a non smoker and  [phone]

## 2024-01-15 NOTE — PROGRESS NOTES
Lovenox Monitoring  Indication:  atrial flutter  Recent Labs     01/14/24  1134   HGB 16.2*        Current Weight: 61..5 kg  Est. CrCl = 29 ml/min  Current Dose: 60 mg subcutaneously every 12 hours.  Plan: Change to 60 q24hr for creatinine clearance <30 ml/min per protocol

## 2024-01-15 NOTE — PROGRESS NOTES
Spiritual Care Assessment/Progress Note  [facility]    Name: Frannie Blake MRN: [id number]    Age: 78 y.o.     Sex: female   Language: English     Date: 1/15/2024            Total Time Calculated: 24 min              Spiritual Assessment begun in [facility] 4 CV SERVICES UNIT  Service Provided For:: Patient  Referral/Consult From:: Patient  Encounter Overview/Reason : Spiritual/Emotional Needs, Grief, Loss, and Adjustments    Spiritual beliefs:      [x] Involved in a dionicio tradition/spiritual practice: Mandaeism     [x] Supported by a dionicio community: [facility]     [] Claims no spiritual orientation:      [] Seeking spiritual identity:           [] Adheres to an individual form of spirituality:      [] Not able to assess:                Identified resources for coping and support system:   Support System: Family members, [employer]ian/dionicio community       [x] Prayer                  [] Devotional reading               [] Music                  [] Guided Imagery     [] Pet visits                                        [] Other: (COMMENT)     Specific area/focus of visit   Encounter:    Crisis:    Spiritual/Emotional needs:    Ritual, Rites and Sacraments:    Grief, Loss, and Adjustments: Type: New Diagnosis  Ethics/Mediation:    Behavioral Health:    Palliative Care:    Advance Care Planning:      Plan/Referrals: Referred to (Comment) (Unit  team for follow up)    Narrative:     Responded to camila bowden requested  visit after learning of new CHF dx.     I visited with Ms. Blake. Her neighbor and niece Sonia were both present. Ms. Blake shared that she was feeling anxious about her heart condition. She expressed that she's struggling with her emotions and giving things over to God. Ms. Blake shared that she has a strong Jew dionicio. She attends [employer] and is active in many ministries. Her nieceBrenda is a hospice social worker and is assisting with coordinating ministry

## 2024-01-15 NOTE — PROGRESS NOTES
[facility]  Hospitalist Group                                                                                          Hospitalist Progress Note  Nick De Oliveira MD  Office Phone: [phone]        Date of Service:  1/15/2024  NAME:  Frannie Blake  :  1945  MRN:  [id number]       Admission Summary:   Frannie Blake is a 78 y.o. female PMH HTN, hyperlipidemia, hypothyroid on synthroid who was sent in by PCP Heidy Perez due to ongoing symptoms past 3-4 weeks, mild edema lower extremities which she attributed to trazodone which she is now off of with some improvement (had overt rash allergic rxn) as well as worsening fatigue with no strength to walk up stairs also worsening over past 3-4 weeks. She states after 2 steps she will need to stop and rest.  States \"I just have no stamina\" This am she states she was completely breathless after 3 steos so contacted PCP and she rec she proceed to ED for eval. She also notes a couple weeks ago she was having trouble sleeping, but denies that it was do to PND and states she just couldn't sleep.  This was the reason for the trazooone, but she states its better since stopping the trazodone.  She denies chest pain, headaches, vision changes, shortness of breath at rest, nausea, vomiting, fevers, sick contacts, every time I ask her a ROS question she replies \"I just have no stamina\". She denies h/o tobacco or etoh use and denies any h/o illicit drugs as well.        Interval history / Subjective:   Follow up CHF   Palpitations earlier when put on Amiodarone gtt and digoxin  Assessment & Plan:     # Acute on chronic (suspected chronic diastolic and systolic due to CTA with increased pulmonary artery vasculature as well as bilateral pleural effusions noted in addition to transaminitis suggestive of congestive process)  - Lasix IV bid  - Strict input and output  - Daily weights  - Echo peEF 15-20% with severe global hypokinesis  - Will cycle      Threatened with loss of utilities: No       Review of Systems:   Pertinent items are noted in HPI.       Vital Signs:    Last 24hrs VS reviewed since prior progress note. Most recent are:  Vitals:    01/15/24 1515   BP: 96/74   Pulse: (!) 144   Resp: 25   Temp: 98.1 °F (36.7 °C)   SpO2: 97%         Intake/Output Summary (Last 24 hours) at 1/15/2024 1529  Last data filed at 1/15/2024 0350  Gross per 24 hour   Intake 100 ml   Output 500 ml   Net -400 ml        Physical Examination:     I had a face to face encounter with this patient and independently examined them on 1/15/2024 as outlined below:          General : alert x 3, awake, no acute distress,   HEENT: PEERL, EOMI, moist mucus membrane, TM clear  Neck: supple, no JVD, no meningeal signs  Chest: Clear to auscultation bilaterally   CVS: S1 S2 heard, Capillary refill less than 2 seconds  Abd: soft/ non tender, non distended, BS physiological,   Ext: no clubbing, no cyanosis, no edema, brisk 2+ DP pulses  Neuro/Psych: pleasant mood and affect, CN 2-12 grossly intact, sensory grossly within normal limit, Strength 5/5 in all extremities, DTR 1+ x 4  Skin: warm     Data Review:    Review and/or order of clinical lab test      I have personally and independently reviewed all pertinent labs, diagnostic studies, imaging, and have provided independent interpretation of the same.     Labs:     Recent Labs     01/14/24  1134   WBC 7.3   HGB 16.2*   HCT 49.8*        Recent Labs     01/14/24  1134 01/15/24  0032   * 136   K 3.8 3.4*   CL 99 101   CO2 24 25   BUN 22* 20   MG  --  1.8     Recent Labs     01/14/24  1134 01/15/24  0032   * 138*   GLOB 3.1 2.6     No results for input(s): \"INR\", \"APTT\" in the last 72 hours.    Invalid input(s): \"PTP\"   No results for input(s): \"TIBC\", \"FERR\" in the last 72 hours.    Invalid input(s): \"FE\", \"PSAT\"   No results found for: \"FOL\", \"RBCF\"   No results for input(s): \"PH\", \"PCO2\", \"PO2\" in the last 72 hours.  No

## 2024-01-15 NOTE — PROGRESS NOTES
Notified by staff the pt became tachycardic, HR to 140's-150.  12 lead EKG shows Aflutter with RVR.  BP 97/72, pt feels anxious.  Repeat .  Pt denies any chest pain, SOB. States if she really thinks about it she might have palpitations.  She does report she has had palpitations at times at home but unable to recall the last time she had palpitations.      Start IV amiodarone and give digoxin 0.25 mg IV x 1. Will give additional 20 meq po kcl today also (K this a.m. 3.4 and receiving IV lasix).  Since BP Improved, add amiodarone bolus. TSH is pending.   Dr. Mcguire aware and came to bedside.

## 2024-01-15 NOTE — ED NOTES
2000 Received patient from SILVIA Bailey.    0045 Change in orientation. Physician notified and orders received.    0200 Report given to JAZMYNE Curry RN   (oncoming nurse) by Yoko Izquierdo RN. Report included the following information SBAR, MAR, Recent Results, and Cardiac Rhythm NSR.

## 2024-01-15 NOTE — PROGRESS NOTES
Order acknowledged and chart reviewed.  Attempted to see pt for OT eval, pt's resting .  Nursing notified.  Will defer eval at this time.  Will con't to follow.

## 2024-01-15 NOTE — PROGRESS NOTES
Patient has developed atrial flutter rate approximate 150.  Relatively asymptomatic from the atrial flutter she is mostly anxious but no particular chest pain or shortness of breath reported at this time.  No hemodynamic compromise thus far.    Started IV amiodarone and IV digoxin at this time blood pressure remains \"soft\".    Heart rate has diminished to approximately 130 132.  Will give additional dose of digoxin.    Current finding discussed with patient and her family who are aware of potential sudden deterioration.  Thus far nonetheless again the patient remains asymptomatic essentially and hemodynamically stable.

## 2024-01-15 NOTE — ED NOTES
Bedside and Verbal shift change report given to SILVIA Babcock (oncoming nurse) by SILVIA Bailey (offgoing nurse). Report included the following information Nurse Handoff Report, ED SBAR, Intake/Output, MAR, Recent Results, and Cardiac Rhythm SR w/ 1st AVB .

## 2024-01-15 NOTE — CONSULTS
Nutrition Education    Consult appreciated.  Pt states she does add salt to many foods and loves to eat [place] stew (canned).  Recommend alternate ways to season her foods without adding salt, pt agreeable.  Also recommended looking for low Na+ canned soup products.  Reviewed other foods that are high in Na+ and how to read a food label.    Educated on Low Na+ diet  Learners: Patient and Family  Readiness: Acceptance  Method: Explanation and Handout  Response: Verbalizes Understanding  Contact name and number provided.    Sawyer Flowers RD  Contact via [employer]

## 2024-01-15 NOTE — CONSULTS
[facility] CARDIOLOGY  Cardiology Care Note                  [x]Initial visit     []Established visit     Patient Name: Frannie Blake - :1945 - MRN:[id number]  Primary Cardiologist: None  Consulting Cardiologist: Abram Mcguire MD     Reason for initial visit:  new onset CHF, elevated trop of 177    HPI:   Frnanie Blake is a 78 y.o. female with PMH of HTN, hypothyroidism, 1st degree AVB (noted by PCP on prior EKG per notes) who presents with chief c/o fatigue and SOB.  Has had new fatigue since she had UTI around Thanksgiving last year. Fatigue has worsened over past week. She also has had SOB, specifaly WONG for few months but this has progressively worsened over past week to point where she get SOB with climbing 2 stairs and having some conversational dyspnea, both new to her.  Reports BLE edema over past week L>R. (also had edema with trazaodone which improved after she stopped med.  She reports she has had some \"chest pain\" sometimes with exertion but describes the chest discomfort as a \"shortness of breath, fatigue in chest\"  Denied PND, abdominal swelling.  + facial edema and skin changes. Has had poor appetite recently.  She says she may have had some palpitations. She went to her PCP yesterday and had EKG which showed new T wave inversions and was referred to ER.   In ER, HS troponin 177, 147. EKG, NSR, 1st degree AVB, infero-lateral t wave inversion. BNP 31,550.  , . CTA chest, no PE, moderate bilateral pleural effusions, interstitial pulmonary edema. Started on IV lasix. Told Dr. Mcguire- stated synthroid and metoprolol was stopped in December  SH: never smoker, no ETOH use.  [employer]ster[employer] reyes.  FH: father had heart failure      SUBJECTIVE:  Denies having any chest pain here. Breathing a little better, increased Urine output with IV lasix (net -400 mls/12 hours)       Assessment and Plan   Patient seen  0935    sodium chloride flush 0.9 % injection 5-40 mL, 5-40 mL, IntraVENous, PRN, Prakash Parker MD    0.9 % sodium chloride infusion, , IntraVENous, PRN, Prakash Parker MD    ondansetron (ZOFRAN-ODT) disintegrating tablet 4 mg, 4 mg, Oral, Q8H PRN **OR** ondansetron (ZOFRAN) injection 4 mg, 4 mg, IntraVENous, Q6H PRN, Prakash Parker MD    polyethylene glycol (GLYCOLAX) packet 17 g, 17 g, Oral, Daily PRN, Prakash Parker MD    acetaminophen (TYLENOL) tablet 650 mg, 650 mg, Oral, Q6H PRN **OR** acetaminophen (TYLENOL) suppository 650 mg, 650 mg, Rectal, Q6H PRN, Prakash Parker MD    enoxaparin Sodium (LOVENOX) injection 30 mg, 30 mg, SubCUTAneous, Q24H, Prakash Parker MD, 30 mg at 01/14/24 1850    potassium chloride 10 mEq/100 mL IVPB (Peripheral Line), 10 mEq, IntraVENous, PRN, Prakash Parker MD    magnesium sulfate 2000 mg in 50 mL IVPB premix, 2,000 mg, IntraVENous, PRN, Prakash Parker MD    [Held by provider] lisinopril (PRINIVIL;ZESTRIL) tablet 5 mg, 5 mg, Oral, Daily, Prakash Parker MD    furosemide (LASIX) injection 40 mg, 40 mg, IntraVENous, BID, Prakash Parker MD, 40 mg at 01/15/24 0934    Vitamin D (CHOLECALCIFEROL) tablet 2,000 Units, 2,000 Units, Oral, Daily, Prakash Parker MD, 2,000 Units at 01/15/24 0969    HILARIO Mortensen NP    [facility]  [place] center: (P) [phone]  (F) [phone]

## 2024-01-16 LAB
25(OH)D3 SERPL-MCNC: 52.8 NG/ML (ref 30–100)
ALBUMIN SERPL-MCNC: 2.8 G/DL (ref 3.5–5)
ALBUMIN/GLOB SERPL: 1.2 (ref 1.1–2.2)
ALP SERPL-CCNC: 64 U/L (ref 45–117)
ALT SERPL-CCNC: 111 U/L (ref 12–78)
ANION GAP SERPL CALC-SCNC: 12 MMOL/L (ref 5–15)
APPEARANCE UR: CLEAR
AST SERPL-CCNC: 129 U/L (ref 15–37)
BACTERIA URNS QL MICRO: NEGATIVE /HPF
BASOPHILS # BLD: 0 K/UL (ref 0–0.1)
BASOPHILS NFR BLD: 1 % (ref 0–1)
BILIRUB SERPL-MCNC: 1.3 MG/DL (ref 0.2–1)
BILIRUB UR QL: NEGATIVE
BUN SERPL-MCNC: 20 MG/DL (ref 6–20)
BUN/CREAT SERPL: 13 (ref 12–20)
CALCIUM SERPL-MCNC: 8 MG/DL (ref 8.5–10.1)
CHLORIDE SERPL-SCNC: 97 MMOL/L (ref 97–108)
CO2 SERPL-SCNC: 28 MMOL/L (ref 21–32)
COLOR UR: ABNORMAL
CREAT SERPL-MCNC: 1.58 MG/DL (ref 0.55–1.02)
DIFFERENTIAL METHOD BLD: NORMAL
EOSINOPHIL # BLD: 0.2 K/UL (ref 0–0.4)
EOSINOPHIL NFR BLD: 3 % (ref 0–7)
EPITH CASTS URNS QL MICRO: ABNORMAL /LPF
ERYTHROCYTE [DISTWIDTH] IN BLOOD BY AUTOMATED COUNT: 13.8 % (ref 11.5–14.5)
EST. AVERAGE GLUCOSE BLD GHB EST-MCNC: 120 MG/DL
GLOBULIN SER CALC-MCNC: 2.3 G/DL (ref 2–4)
GLUCOSE SERPL-MCNC: 145 MG/DL (ref 65–100)
GLUCOSE UR STRIP.AUTO-MCNC: NEGATIVE MG/DL
HBA1C MFR BLD: 5.8 % (ref 4–5.6)
HCT VFR BLD AUTO: 43 % (ref 35–47)
HGB BLD-MCNC: 14.6 G/DL (ref 11.5–16)
HGB UR QL STRIP: NEGATIVE
HYALINE CASTS URNS QL MICRO: ABNORMAL /LPF (ref 0–5)
IMM GRANULOCYTES # BLD AUTO: 0 K/UL (ref 0–0.04)
IMM GRANULOCYTES NFR BLD AUTO: 0 % (ref 0–0.5)
KETONES UR QL STRIP.AUTO: NEGATIVE MG/DL
LEUKOCYTE ESTERASE UR QL STRIP.AUTO: ABNORMAL
LYMPHOCYTES # BLD: 1 K/UL (ref 0.8–3.5)
LYMPHOCYTES NFR BLD: 16 % (ref 12–49)
MAGNESIUM SERPL-MCNC: 1.6 MG/DL (ref 1.6–2.4)
MCH RBC QN AUTO: 29.5 PG (ref 26–34)
MCHC RBC AUTO-ENTMCNC: 34 G/DL (ref 30–36.5)
MCV RBC AUTO: 86.9 FL (ref 80–99)
MONOCYTES # BLD: 0.7 K/UL (ref 0–1)
MONOCYTES NFR BLD: 11 % (ref 5–13)
NEUTS SEG # BLD: 4.5 K/UL (ref 1.8–8)
NEUTS SEG NFR BLD: 70 % (ref 32–75)
NITRITE UR QL STRIP.AUTO: NEGATIVE
NRBC # BLD: 0 K/UL (ref 0–0.01)
NRBC BLD-RTO: 0 PER 100 WBC
PH UR STRIP: 6.5 (ref 5–8)
PLATELET # BLD AUTO: 169 K/UL (ref 150–400)
PMV BLD AUTO: 10.7 FL (ref 8.9–12.9)
POTASSIUM SERPL-SCNC: 3.1 MMOL/L (ref 3.5–5.1)
PROT SERPL-MCNC: 5.1 G/DL (ref 6.4–8.2)
PROT UR STRIP-MCNC: NEGATIVE MG/DL
RBC # BLD AUTO: 4.95 M/UL (ref 3.8–5.2)
RBC #/AREA URNS HPF: ABNORMAL /HPF (ref 0–5)
SODIUM SERPL-SCNC: 137 MMOL/L (ref 136–145)
SP GR UR REFRACTOMETRY: 1.01 (ref 1–1.03)
SPECIMEN HOLD: NORMAL
T4 FREE SERPL-MCNC: 2.17 NG/DL (ref 0.82–1.77)
TSH SERPL DL<=0.05 MIU/L-ACNC: 8.55 UIU/ML (ref 0.45–4.5)
URATE SERPL-MCNC: 10.4 MG/DL (ref 2.6–6)
URINE CULTURE IF INDICATED: ABNORMAL
UROBILINOGEN UR QL STRIP.AUTO: 0.2 EU/DL (ref 0.2–1)
WBC # BLD AUTO: 6.4 K/UL (ref 3.6–11)
WBC URNS QL MICRO: ABNORMAL /HPF (ref 0–4)

## 2024-01-16 PROCEDURE — 2580000003 HC RX 258: Performed by: FAMILY MEDICINE

## 2024-01-16 PROCEDURE — APPNB45 APP NON BILLABLE 31-45 MINUTES: Performed by: NURSE PRACTITIONER

## 2024-01-16 PROCEDURE — 82784 ASSAY IGA/IGD/IGG/IGM EACH: CPT

## 2024-01-16 PROCEDURE — 2709999900 HC NON-CHARGEABLE SUPPLY: Performed by: INTERNAL MEDICINE

## 2024-01-16 PROCEDURE — 97161 PT EVAL LOW COMPLEX 20 MIN: CPT

## 2024-01-16 PROCEDURE — 93460 R&L HRT ART/VENTRICLE ANGIO: CPT | Performed by: INTERNAL MEDICINE

## 2024-01-16 PROCEDURE — 2060000000 HC ICU INTERMEDIATE R&B

## 2024-01-16 PROCEDURE — 84550 ASSAY OF BLOOD/URIC ACID: CPT

## 2024-01-16 PROCEDURE — 83735 ASSAY OF MAGNESIUM: CPT

## 2024-01-16 PROCEDURE — 97165 OT EVAL LOW COMPLEX 30 MIN: CPT

## 2024-01-16 PROCEDURE — C1713 ANCHOR/SCREW BN/BN,TIS/BN: HCPCS | Performed by: INTERNAL MEDICINE

## 2024-01-16 PROCEDURE — 99153 MOD SED SAME PHYS/QHP EA: CPT | Performed by: INTERNAL MEDICINE

## 2024-01-16 PROCEDURE — 6360000002 HC RX W HCPCS: Performed by: INTERNAL MEDICINE

## 2024-01-16 PROCEDURE — 6370000000 HC RX 637 (ALT 250 FOR IP): Performed by: NURSE PRACTITIONER

## 2024-01-16 PROCEDURE — C1894 INTRO/SHEATH, NON-LASER: HCPCS | Performed by: INTERNAL MEDICINE

## 2024-01-16 PROCEDURE — 81001 URINALYSIS AUTO W/SCOPE: CPT

## 2024-01-16 PROCEDURE — 6360000002 HC RX W HCPCS: Performed by: FAMILY MEDICINE

## 2024-01-16 PROCEDURE — 99152 MOD SED SAME PHYS/QHP 5/>YRS: CPT | Performed by: INTERNAL MEDICINE

## 2024-01-16 PROCEDURE — 36415 COLL VENOUS BLD VENIPUNCTURE: CPT

## 2024-01-16 PROCEDURE — 2580000003 HC RX 258: Performed by: NURSE PRACTITIONER

## 2024-01-16 PROCEDURE — 6360000004 HC RX CONTRAST MEDICATION: Performed by: INTERNAL MEDICINE

## 2024-01-16 PROCEDURE — C1725 CATH, TRANSLUMIN NON-LASER: HCPCS | Performed by: INTERNAL MEDICINE

## 2024-01-16 PROCEDURE — B2111ZZ FLUOROSCOPY OF MULTIPLE CORONARY ARTERIES USING LOW OSMOLAR CONTRAST: ICD-10-PCS | Performed by: INTERNAL MEDICINE

## 2024-01-16 PROCEDURE — 76937 US GUIDE VASCULAR ACCESS: CPT | Performed by: INTERNAL MEDICINE

## 2024-01-16 PROCEDURE — 80053 COMPREHEN METABOLIC PANEL: CPT

## 2024-01-16 PROCEDURE — 4A023N8 MEASUREMENT OF CARDIAC SAMPLING AND PRESSURE, BILATERAL, PERCUTANEOUS APPROACH: ICD-10-PCS | Performed by: INTERNAL MEDICINE

## 2024-01-16 PROCEDURE — 82306 VITAMIN D 25 HYDROXY: CPT

## 2024-01-16 PROCEDURE — 2500000003 HC RX 250 WO HCPCS: Performed by: INTERNAL MEDICINE

## 2024-01-16 PROCEDURE — 6360000002 HC RX W HCPCS: Performed by: NURSE PRACTITIONER

## 2024-01-16 PROCEDURE — 97535 SELF CARE MNGMENT TRAINING: CPT

## 2024-01-16 PROCEDURE — 83521 IG LIGHT CHAINS FREE EACH: CPT

## 2024-01-16 PROCEDURE — 93005 ELECTROCARDIOGRAM TRACING: CPT | Performed by: INTERNAL MEDICINE

## 2024-01-16 PROCEDURE — 85025 COMPLETE CBC W/AUTO DIFF WBC: CPT

## 2024-01-16 PROCEDURE — 97116 GAIT TRAINING THERAPY: CPT

## 2024-01-16 PROCEDURE — 6370000000 HC RX 637 (ALT 250 FOR IP): Performed by: FAMILY MEDICINE

## 2024-01-16 PROCEDURE — 84165 PROTEIN E-PHORESIS SERUM: CPT

## 2024-01-16 PROCEDURE — 99233 SBSQ HOSP IP/OBS HIGH 50: CPT | Performed by: SPECIALIST

## 2024-01-16 PROCEDURE — 86334 IMMUNOFIX E-PHORESIS SERUM: CPT

## 2024-01-16 PROCEDURE — 83036 HEMOGLOBIN GLYCOSYLATED A1C: CPT

## 2024-01-16 RX ORDER — MIDAZOLAM HYDROCHLORIDE 1 MG/ML
INJECTION INTRAMUSCULAR; INTRAVENOUS PRN
Status: DISCONTINUED | OUTPATIENT
Start: 2024-01-16 | End: 2024-01-16 | Stop reason: HOSPADM

## 2024-01-16 RX ORDER — ENOXAPARIN SODIUM 100 MG/ML
1 INJECTION SUBCUTANEOUS EVERY 24 HOURS
Status: DISCONTINUED | OUTPATIENT
Start: 2024-01-16 | End: 2024-01-17

## 2024-01-16 RX ORDER — FENTANYL CITRATE 50 UG/ML
INJECTION, SOLUTION INTRAMUSCULAR; INTRAVENOUS PRN
Status: DISCONTINUED | OUTPATIENT
Start: 2024-01-16 | End: 2024-01-16 | Stop reason: HOSPADM

## 2024-01-16 RX ORDER — AMIODARONE HYDROCHLORIDE 200 MG/1
200 TABLET ORAL 3 TIMES DAILY
Status: DISCONTINUED | OUTPATIENT
Start: 2024-01-17 | End: 2024-01-19

## 2024-01-16 RX ORDER — LIDOCAINE HYDROCHLORIDE 10 MG/ML
INJECTION, SOLUTION INFILTRATION; PERINEURAL PRN
Status: DISCONTINUED | OUTPATIENT
Start: 2024-01-16 | End: 2024-01-16 | Stop reason: HOSPADM

## 2024-01-16 RX ORDER — SPIRONOLACTONE 25 MG/1
12.5 TABLET ORAL DAILY
Status: DISCONTINUED | OUTPATIENT
Start: 2024-01-16 | End: 2024-01-20 | Stop reason: HOSPADM

## 2024-01-16 RX ORDER — MAGNESIUM SULFATE IN WATER 40 MG/ML
2000 INJECTION, SOLUTION INTRAVENOUS ONCE
Status: COMPLETED | OUTPATIENT
Start: 2024-01-16 | End: 2024-01-16

## 2024-01-16 RX ORDER — ALLOPURINOL 100 MG/1
100 TABLET ORAL DAILY
Status: DISCONTINUED | OUTPATIENT
Start: 2024-01-16 | End: 2024-01-20 | Stop reason: HOSPADM

## 2024-01-16 RX ORDER — SODIUM CHLORIDE 9 MG/ML
INJECTION, SOLUTION INTRAVENOUS CONTINUOUS
Status: DISPENSED | OUTPATIENT
Start: 2024-01-16 | End: 2024-01-17

## 2024-01-16 RX ORDER — HEPARIN SODIUM 1000 [USP'U]/ML
INJECTION, SOLUTION INTRAVENOUS; SUBCUTANEOUS PRN
Status: DISCONTINUED | OUTPATIENT
Start: 2024-01-16 | End: 2024-01-16 | Stop reason: HOSPADM

## 2024-01-16 RX ORDER — POTASSIUM CHLORIDE 750 MG/1
40 TABLET, FILM COATED, EXTENDED RELEASE ORAL ONCE
Status: COMPLETED | OUTPATIENT
Start: 2024-01-16 | End: 2024-01-16

## 2024-01-16 RX ORDER — ENOXAPARIN SODIUM 100 MG/ML
1 INJECTION SUBCUTANEOUS EVERY 24 HOURS
Status: DISCONTINUED | OUTPATIENT
Start: 2024-01-17 | End: 2024-01-16

## 2024-01-16 RX ADMIN — MAGNESIUM SULFATE HEPTAHYDRATE 2000 MG: 40 INJECTION, SOLUTION INTRAVENOUS at 10:44

## 2024-01-16 RX ADMIN — Medication 2000 UNITS: at 09:20

## 2024-01-16 RX ADMIN — BUPROPION HYDROCHLORIDE 150 MG: 150 TABLET, FILM COATED, EXTENDED RELEASE ORAL at 09:20

## 2024-01-16 RX ADMIN — ATORVASTATIN CALCIUM 20 MG: 20 TABLET, FILM COATED ORAL at 09:21

## 2024-01-16 RX ADMIN — ENOXAPARIN SODIUM 60 MG: 100 INJECTION SUBCUTANEOUS at 21:05

## 2024-01-16 RX ADMIN — ALLOPURINOL 100 MG: 100 TABLET ORAL at 11:55

## 2024-01-16 RX ADMIN — LEVOTHYROXINE SODIUM 75 MCG: 0.07 TABLET ORAL at 07:15

## 2024-01-16 RX ADMIN — METOPROLOL SUCCINATE 25 MG: 25 TABLET, EXTENDED RELEASE ORAL at 09:20

## 2024-01-16 RX ADMIN — SODIUM CHLORIDE, PRESERVATIVE FREE 10 ML: 5 INJECTION INTRAVENOUS at 21:07

## 2024-01-16 RX ADMIN — SODIUM CHLORIDE: 9 INJECTION, SOLUTION INTRAVENOUS at 16:57

## 2024-01-16 RX ADMIN — FOLIC ACID 1 MG: 1 TABLET ORAL at 09:20

## 2024-01-16 RX ADMIN — FUROSEMIDE 40 MG: 10 INJECTION, SOLUTION INTRAMUSCULAR; INTRAVENOUS at 09:21

## 2024-01-16 RX ADMIN — SODIUM CHLORIDE, PRESERVATIVE FREE 10 ML: 5 INJECTION INTRAVENOUS at 09:22

## 2024-01-16 RX ADMIN — POTASSIUM CHLORIDE 40 MEQ: 750 TABLET, FILM COATED, EXTENDED RELEASE ORAL at 09:21

## 2024-01-16 RX ADMIN — AMIODARONE HYDROCHLORIDE 0.5 MG/MIN: 50 INJECTION, SOLUTION INTRAVENOUS at 13:35

## 2024-01-16 ASSESSMENT — ENCOUNTER SYMPTOMS
NAUSEA: 0
SHORTNESS OF BREATH: 0
COUGH: 0
VOMITING: 0
ABDOMINAL DISTENTION: 0

## 2024-01-16 NOTE — PROGRESS NOTES
Spiritual Care Partner Volunteer visited patient at [facility] in [facility] 4 CV SERVICES UNIT on 1/16/2024   Documented by:  Vel Hercules MDIV, BCC

## 2024-01-16 NOTE — PLAN OF CARE
Problem: Occupational Therapy - Adult  Goal: By Discharge: Performs self-care activities at highest level of function for planned discharge setting.  See evaluation for individualized goals.  Description: FUNCTIONAL STATUS PRIOR TO ADMISSION:  Pt lived alone in a multi-story home with 3 FILIPPO. Pt reports she can live on the main level with the exception of laundry in basement. Pt was independent with BADLs/IADLs at baseline without the use of AE. Pt is a  and spends her time at [employer]. Pt has a daughter that lives 3+ hours away.    HOME SUPPORT: Patient lived alone with no local support.    Occupational Therapy Goals:  Initiated 1/16/2024  1.  Patient will perform grooming standing at sink with Modified [place] within 7 day(s).  2.  Patient will perform lower body dressing with Modified [place] within 7 day(s).  3.  Patient will perform shower transfer with Modified [place] within 7 day(s).  4.  Patient will perform toilet transfers with Modified [place]  within 7 day(s).  5.  Patient will perform all aspects of toileting with Modified [place] within 7 day(s).  6.  Patient will participate in upper extremity therapeutic exercise/activities with Modified [place] for 10 minutes within 7 day(s).    7.  Patient will utilize energy conservation techniques during functional activities with verbal cues within 7 day(s).      Outcome: Progressing   OCCUPATIONAL THERAPY EVALUATION    Patient: Frannie Blake (78 y.o. female)  Date: 1/16/2024  Primary Diagnosis: Acute on chronic combined systolic (congestive) and diastolic (congestive) heart failure (HCC) [I50.43]  New onset of congestive heart failure (HCC) [I50.9]  Procedure(s) (LRB):  Left and right heart cath / coronary angiography (N/A)       Precautions: Fall Risk                  ASSESSMENT :  The patient is limited by decreased functional mobility, independence in ADLs, high-level IADLs, strength, activity tolerance,

## 2024-01-16 NOTE — PROGRESS NOTES
Cardiac Cath Lab Procedure Area Arrival Note:    Frannie Blake arrived to Cardiac Cath Lab, Procedure Area. Patient identifiers verified with NAME and DATE OF BIRTH. Procedure verified with patient. Consent forms verified. Allergies verified. Patient informed of procedure and plan of care. Questions answered with review. Patient voiced understanding of procedure and plan of care.    Patient on cardiac monitor, non-invasive blood pressure, SPO2 monitor. On RA .  IV of NS on pump at 50 ml/hr. Patient status doing well without problems. Patient is A&Ox 4. Patient reports no c/o pain.     Patient medicated during procedure with orders obtained and verified by Dr. Ray.    Refer to patients Cardiac Cath Lab PROCEDURE REPORT for vital signs, assessment, status, and response during procedure, printed at end of case. Printed report on chart or scanned into chart.

## 2024-01-16 NOTE — PLAN OF CARE
Problem: Physical Therapy - Adult  Goal: By Discharge: Performs mobility at highest level of function for planned discharge setting.  See evaluation for individualized goals.  Description: FUNCTIONAL STATUS PRIOR TO ADMISSION: Patient was independent and active without use of DME.    HOME SUPPORT PRIOR TO ADMISSION: The patient lived alone with no local support.    Physical Therapy Goals  Initiated 1/16/2024  1.  Patient will move from supine to sit and sit to supine and roll side to side in bed with modified independence within 7 day(s).    2.  Patient will perform sit to stand with modified independence within 7 day(s).  3.  Patient will transfer from bed to chair and chair to bed with modified independence using the least restrictive device within 7 day(s).  4.  Patient will ambulate with modified independence for 150 feet with the least restrictive device within 7 day(s).   5.  Patient will ascend/descend 4 stairs with single handrail(s) with supervision/set-up within 7 day(s).    Outcome: Progressing   PHYSICAL THERAPY EVALUATION    Patient: Frannie Blake (78 y.o. female)  Date: 1/16/2024  Primary Diagnosis: Acute on chronic combined systolic (congestive) and diastolic (congestive) heart failure (HCC) [I50.43]  New onset of congestive heart failure (HCC) [I50.9]       Precautions: Fall Risk       ASSESSMENT :   DEFICITS/IMPAIRMENTS:   The patient is limited by decreased functional mobility, strength, activity tolerance s/p admission on 1/14 with weakness, fatigue, WONG, afib with RVR. Pt went into aflutter yesterday sustaining into 150s. Pt's HR now stable in NSR.    Based on the impairments listed above pt mobilized at A for bed mobility, transfers, gait training x 20 ft. Pt's HR stable in the 70's and spO2: 95% with exertion on room air. Pt educated on the importance of remaining OOB for all meals, walking to and from the bathroom with RN staff. Will continue to benefit from PT to progress mobility as

## 2024-01-16 NOTE — PROGRESS NOTES
0730: Bedside and Verbal shift change report given to SILVIA Casiano (oncoming nurse) by SILVIA Holland (offgoing nurse). Report included the following information Nurse Handoff Report, Index, Adult Overview, MAR, Recent Results, and Cardiac Rhythm NSR . Rate verified amio gtt.     0900: NP Jeremiah notified of pt eating breakfast prior to NPO order, will continue with heart cath.     1345: Pt transported down to cath lab with nurse, monitor, and amio gtt.     Pt family concerned about pt safety when family isn't at bedside, reinforced use of bed in lowest position, call bell within reach, close to nurses station w/ door opened, & bed alarm on. Pt family requesting possibility for sitter at night time, MD De Oliveira notified. Telephone order received for sitter.     1655: Verbal report given to SILVIA Casiano by Selma .  Report included Nurse Handoff Report, Index, Adult Overview, MAR, Intake and Output, and Cardiac Rhythm NSR / SB .     11 cc of air in TR band.     Cath sites: R radial & IJ.     1730: Pt arrived to unit and reconnected to monitor. R radial & IJ CDI, no bleeding or hematoma, pulse and sensation present.     8cc of air in TR band upon arrival.     1815: TR band removed w/ board still present. R Radial site cover w/ tegaderm - site CDI, no bleeding or hematoma, pulse and sensation present.     1930: Bedside and Verbal shift change report given to SILVIA Holland (oncoming nurse) by SILVIA Casiano (offgoing nurse). Report included the following information Nurse Handoff Report, Index, Adult Overview, MAR, Recent Results, and Cardiac Rhythm NSR .         Care Plan:   Problem: Safety - Adult  Goal: Free from fall injury  1/16/2024 0815 by Lisa Yoder, RN  Outcome: Progressing  1/15/2024 2308 by Amalia James RN  Outcome: Progressing  Flowsheets (Taken 1/15/2024 2308)  Free From Fall Injury: Instruct family/caregiver on patient safety     Problem: Discharge Planning  Goal: Discharge to home or other facility with appropriate  resources  1/16/2024 0815 by Lisa Yoder RN  Outcome: Progressing  1/15/2024 2308 by Amalia James RN  Outcome: Progressing  Flowsheets (Taken 1/15/2024 2000)  Discharge to home or other facility with appropriate resources: Identify barriers to discharge with patient and caregiver     Problem: Chronic Conditions and Co-morbidities  Goal: Patient's chronic conditions and co-morbidity symptoms are monitored and maintained or improved  1/16/2024 0815 by Lisa Yoder RN  Outcome: Progressing  1/15/2024 2308 by Amalia James RN  Outcome: Progressing  Flowsheets (Taken 1/15/2024 2000)  Care Plan - Patient's Chronic Conditions and Co-Morbidity Symptoms are Monitored and Maintained or Improved: Monitor and assess patient's chronic conditions and comorbid symptoms for stability, deterioration, or improvement

## 2024-01-16 NOTE — PROGRESS NOTES
1345: Pt HR 140s, a flutter RVR on monitor. BP 97/72. Pt reports anxiety, denies palpitations or dyspnea. Notified Dayana NP.    Throughout event,orders received for amio gtt, IV digoxin.      1430:Pt HR continues to be a.flutter RVR 150s despite ordered meds. Notified Dayana NP, orders received for amio bolus.    1639: Pt converted to SR;1AVB.

## 2024-01-16 NOTE — PROGRESS NOTES
[facility] [place] CARDIOLOGY  Cardiology Care Note                  []Initial visit     [x]Established visit     Patient Name: Frannie Blake - :1945 - MRN:[id number]  Primary Cardiologist: None  Dr. Abram Mcguire (new pt)     Reason for initial visit:  new onset CHF, elevated trop of 177      SUBJECTIVE:  Denies chest pain, SOB (and she is laying nearly flat), dizziness. No palpatations. Has been getting up to commode without noticible SOB.  She expresses some anxiety in general and about health.  \"What should I be doing?.\"  Daughter at bedside. Indicates that they are interested in inpatient rehab after discharge.   Wt is down 2.5 lbs/24 hours.  Creatinine 1.58.   Converted to NSR yesterday around 4:30 PM. Currently NSR with 1st degree AVB, HR 60-64. sBP /69     Assessment and Plan   Patient seen on the day of progress note and examined  and agree with Advance Practice Provider (MARIAH, NP,PA)  assessment and plans.  1.  Cardiomyopathy: Severe of unknown etiology at this time.    Proceed with left and right cardiac catheterization.    Discussed with the patient and her daughter risks and benefits of cardiac catheterization and they are agreeable to proceed.    She seems to be better compensated at this point.    Continue with Toprol and Entresto when blood pressure allows.  Eventually add a small dose of Aldactone after the cardiac catheterization and attempt Jardiance upon discharge with the understanding of close follow-up on UTIs.    2.  Atrial flutter: Back in normal sinus rhythm continue IV amiodarone and Toprol at this time change amiodarone to p.o. tomorrow.    She will need Eliquis upon discharge.   Acute HFrEF:  EF 15-20%, severe global hypok. Continue - may need to change to PM dosing 12.5 mg daily).  GDMT limited by low-soft BP.  Continue Toprol XL.  Add low dose entresto when BP allows- currently bp soft.    or midline shift. There is no intracranial hemorrhage or  extra-axial fluid collection. There is no abnormal area of decreased density to  suggest infarct. There is age-indeterminate microvascular ischemic change in the  periventricular white matter. The basal cisterns are patent.    The osseous structures are intact. The visualized paranasal sinuses and mastoid  air cells are clear.    Impression  No acute intracranial abnormality. Age-indeterminate microvascular ischemic  change in the periventricular white matter.    Xray Result (most recent):  XR CHEST PORTABLE 2024    Narrative  INDICATION:  dyspnea    COMPARISON: None    FINDINGS: Single AP portable view of the chest obtained at 1144 demonstrates an  enlarged cardiac silhouette. There are small bilateral pleural effusions with  bibasilar atelectasis. No osseous abnormalities are seen.    Impression  Cardiomegaly. Small bilateral pleural effusions with bibasilar  atelectasis.        Recent Labs     24  1134 01/15/24  0032 24  0448   * 136 137   K 3.8 3.4* 3.1*   CL 99 101 97   CO2 24 25 28   BUN 22* 20 20   MG  --  1.8 1.6   * 138* 111*     Recent Labs     24  1134 24  0448   WBC 7.3 6.4   HGB 16.2* 14.6   HCT 49.8* 43.0    169     Creatinine (MG/DL)   Date Value   2024 1.58 (H)   01/15/2024 1.41 (H)   2024 1.60 (H)       Current meds:    Current Facility-Administered Medications:     potassium chloride (KLOR-CON) extended release tablet 40 mEq, 40 mEq, Oral, Once, Dayana Daniels, APRN - NP    magnesium sulfate 2000 mg in 50 mL IVPB premix, 2,000 mg, IntraVENous, Once, Dayana Daniels, APRN - NP    [] amiodarone (CORDARONE) 450 mg in dextrose 5 % 250 mL infusion ([id number]), 1 mg/min, IntraVENous, Continuous, Last Rate: 33.3 mL/hr at 01/15/24 1941, 1 mg/min at 01/15/24 1941 **FOLLOWED BY** amiodarone (CORDARONE) 450 mg in dextrose 5 % 250 mL infusion ([id number]), 0.5 mg/min, IntraVENous,

## 2024-01-16 NOTE — PROGRESS NOTES
Cardiac Cath Lab Recovery Arrival Note:      Frannie Blake arrived to Cardiac Cath Lab, Recovery Area. Staff introduced to patient. Patient identifiers verified with NAME and DATE OF BIRTH. Procedure verified with patient. Consent forms reviewed and signed by patient or authorized representative and verified. Allergies verified.     Patient and family oriented to department. Patient and family informed of procedure and plan of care.     Questions answered with review. Patient prepped for procedure, per orders from physician, prior to arrival.    Patient on cardiac monitor, non-invasive blood pressure, SPO2 monitor. On Room air. Patient is A&Ox 4. Patient reports No Pain.     Patient in stretcher, in low position, with side rails up, call bell within reach, patient instructed to call if assistance as needed.    Patient prep in: [facility] Recovery Area, [place] [facility].   Patient family : Daughter/Margie [phone]  Family in: Waiting Room .   Prep by: Veronica Negron RN

## 2024-01-16 NOTE — PROGRESS NOTES
[facility] Adult  Hospitalist Group                                                                                          Hospitalist Progress Note  Nick De Oliveira MD  Office Phone: [phone]        Date of Service:  2024  NAME:  Frannie Blake  :  1945  MRN:  [id number]       Admission Summary:   Frannie Blake is a 78 y.o. female PMH HTN, hyperlipidemia, hypothyroid on synthroid who was sent in by PCP Heidy Perez due to ongoing symptoms past 3-4 weeks, mild edema lower extremities which she attributed to trazodone which she is now off of with some improvement (had overt rash allergic rxn) as well as worsening fatigue with no strength to walk up stairs also worsening over past 3-4 weeks. She states after 2 steps she will need to stop and rest.  States \"I just have no stamina\" This am she states she was completely breathless after 3 steos so contacted PCP and she rec she proceed to ED for eval. She also notes a couple weeks ago she was having trouble sleeping, but denies that it was do to PND and states she just couldn't sleep.  This was the reason for the trazooone, but she states its better since stopping the trazodone.  She denies chest pain, headaches, vision changes, shortness of breath at rest, nausea, vomiting, fevers, sick contacts, every time I ask her a ROS question she replies \"I just have no stamina\". She denies h/o tobacco or etoh use and denies any h/o illicit drugs as well.        Interval history / Subjective:   Follow up CHF   Cardiac cath today  Family in the room  Assessment & Plan:     # Acute on chronic (suspected chronic diastolic and systolic due to CTA with increased pulmonary artery vasculature as well as bilateral pleural effusions noted in addition to transaminitis suggestive of congestive process)  - Lasix IV bid  - Strict input and output  - Daily weights  - Echo peEF 15-20% with severe global hypokinesis  - Continue beta blocker  - Add  reviewed all pertinent labs, diagnostic studies, imaging, and have provided independent interpretation of the same.     Labs:     Recent Labs     01/14/24  1134 01/16/24  0448   WBC 7.3 6.4   HGB 16.2* 14.6   HCT 49.8* 43.0    169       Recent Labs     01/14/24  1134 01/15/24  0032 01/16/24  0448   * 136 137   K 3.8 3.4* 3.1*   CL 99 101 97   CO2 24 25 28   BUN 22* 20 20   MG  --  1.8 1.6       Recent Labs     01/14/24  1134 01/15/24  0032 01/16/24  0448   * 138* 111*   GLOB 3.1 2.6 2.3       No results for input(s): \"INR\", \"APTT\" in the last 72 hours.    Invalid input(s): \"PTP\"   No results for input(s): \"TIBC\", \"FERR\" in the last 72 hours.    Invalid input(s): \"FE\", \"PSAT\"   No results found for: \"FOL\", \"RBCF\"   No results for input(s): \"PH\", \"PCO2\", \"PO2\" in the last 72 hours.  No results for input(s): \"CPK\" in the last 72 hours.    Invalid input(s): \"CPKMB\", \"CKNDX\", \"TROIQ\"  Lab Results   Component Value Date/Time    CHOL 118 01/15/2024 12:32 AM    HDL 26 01/15/2024 12:32 AM     No results found for: \"GLUCPOC\"  [unfilled]    Notes reviewed from all clinical/nonclinical/nursing services involved in patient's clinical care. Care coordination discussions were held with appropriate clinical/nonclinical/ nursing providers based on care coordination needs.         Patients current active Medications were reviewed, considered, added and adjusted based on the clinical condition today.      Home Medications were reconciled to the best of my ability given all available resources at the time of admission. Route is PO if not otherwise noted.      Admission Status:[id number]:::1}      Medications Reviewed:     Current Facility-Administered Medications   Medication Dose Route Frequency    allopurinol (ZYLOPRIM) tablet 100 mg  100 mg Oral Daily    spironolactone (ALDACTONE) tablet 12.5 mg  12.5 mg Oral Daily    amiodarone (CORDARONE) 450 mg in dextrose 5 % 250 mL infusion ([id number])  0.5 mg/min IntraVENous

## 2024-01-16 NOTE — PROGRESS NOTES
1000  TRANSFER - IN REPORT:    Verbal report received from SILVIA Casiano on Frannie Blake  [facility]      Report consisted of patient's Situation, Background, Assessment and   Recommendations(SBAR).     Information from the following report(s) Nurse Handoff Report was reviewed with the receiving nurse.    Opportunity for questions and clarification was provided.      Assessment completed upon patient's arrival to unit and care assumed.

## 2024-01-16 NOTE — PLAN OF CARE
Problem: Safety - Adult  Goal: Free from fall injury  Outcome: Progressing  Flowsheets (Taken 1/15/2024 2308)  Free From Fall Injury: Instruct family/caregiver on patient safety     Problem: Discharge Planning  Goal: Discharge to home or other facility with appropriate resources  Outcome: Progressing  Flowsheets (Taken 1/15/2024 2000)  Discharge to home or other facility with appropriate resources: Identify barriers to discharge with patient and caregiver     Problem: Chronic Conditions and Co-morbidities  Goal: Patient's chronic conditions and co-morbidity symptoms are monitored and maintained or improved  Outcome: Progressing  Flowsheets (Taken 1/15/2024 2000)  Care Plan - Patient's Chronic Conditions and Co-Morbidity Symptoms are Monitored and Maintained or Improved: Monitor and assess patient's chronic conditions and comorbid symptoms for stability, deterioration, or improvement

## 2024-01-16 NOTE — PROGRESS NOTES
AD[facility]  [facility] in [place], [place]  Inpatient Progress Note      Patient name: Frannie Blake  Patient : 1945  Patient MRN: [id number]  Consulting MD: Nick De Oliveira MD  Date of service: 24    REASON FOR REFERRAL:  Management of heart failure    ASSESSMENT:  Frannie Blake is a 78 y.o. female admitted with acute systolic heart failure and new onset A-flutter with RVR. Presenting with NYHA class IV symptoms    Interval Events:  NAEO  Converted to SR, HR 60s   Creatinine stable but elevated  T Bili up to 1.3  SBP 90-100s    RECOMMENDATIONS:  Medical Therapy for Heart Failure  Beta-blocker: Continue Toprol XL 25 mg daily. Will titrate as tolerated for rate control.   ACEi/ARB/ARNI: will trial low dose Entresto when BP stable  MRA: start low dose spironolactone 12.5mg daily- watch BP, has hold parameters   SGLT2i: Plan to add Jardiance prior to discharge, will watch for UTI  Other HF drugs:   Getting Digoxin Load  Start allopurinol 100mg daily   Plan on RHC/LHC today     Volume Management  Continue Lasix 40 mg IV BID; goal net negative 2 liters per day (target weight 130 lbs)  Keep K>4 and Mg>2  Fluid restriction to 2000 cc daily, strict I/Os, daily standing weight    A-flutter with RVR  Amiodarone bolus and drip started  Anticoagulation with Lovenox 1 mg/kg q12 hours    Lipid Lowering Therapy  LDL 75.2  Continue Atorvastatin 20 mg daily    Laboratory and Imaging Studies  Echo reviewed  ECG reviewed  Labs: CMP, NT pro-BNP daily.  gammopathy profile    Physical activity and education  Ambulate daily  Heart failure education by nurse navigator  Advanced care plan and document POA    Plans at or after hospital discharge  [facility] on discharge  Schedule sleep study  Follow-up in [facility] Clinic within 7 days from discharge      HISTORY OF PRESENT ILLNESS:  I had the pleasure of seeing Frannie Blake at the request of Dr. Mcguire for evaluation and management of heart  General     Thank you for allowing me to participate in this patient's care.    HILARIO Berumen - NP   [facility]  [facility]  [address], [address]  Phone: [phone]     [Follow-Up: _____] : a [unfilled] follow-up visit

## 2024-01-16 NOTE — PROGRESS NOTES
CATH LAB to RECOVERY ROOM REPORT    Procedure: LHC/RHC plus LV gram    MD: SADAF Ray MD    The procedure was diagnostic only.    Verbal Report given to Recovery Nurse on patient being transferred to Cardiac Cath Lab  for routine post-op. Patient stable upon transfer to .  Vitals, mental status, MAR, procedural summary discussed with recovery RN.    Medication given during procedure:    Contrast: 55 mL                          Heparin: 2500units     Versed: 2mg                                                               Fentanyl: 50mcg                                                             Other Meds/Drips given:          Sheaths:    Right radial sheath pulled at 1555 pm, band at 11mL of air      Right internal jugular vein sheath pulled at 1556 pm, secured with a band-aid.

## 2024-01-16 NOTE — PROGRESS NOTES
TRANSFER - OUT REPORT:    Verbal report given to SILVIA Casiano on Frannie Blake  being transferred to [facility] for routine progression of patient care       Report consisted of patient's Situation, Background, Assessment and   Recommendations(SBAR).     Information from the following report(s) Nurse Handoff Report, Surgery Report, Intake/Output, MAR, Recent Results, Med Rec Status, and Cardiac Rhythm NSR, SB  was reviewed with the receiving nurse.           Lines:   Peripheral IV 01/16/24 Right Antecubital (Active)    Clean, dry, intact, no phlebitis, no infiltration.     Opportunity for questions and clarification was provided.      Patient transported with:  Monitor and Registered Nurse, daughter      1714- Ambulated to the bathroom in room, voided without difficulty, assisted back to bed. Checked radial and IJ site with SILVIA Casiano, no bleeding no hematoma, 3cc air removed from band, 5cc remain. Daughter and niece at bedside.

## 2024-01-17 DIAGNOSIS — I50.22 CHRONIC SYSTOLIC HEART FAILURE (HCC): Primary | ICD-10-CM

## 2024-01-17 LAB
ALBUMIN SERPL-MCNC: 2.9 G/DL (ref 3.5–5)
ALBUMIN/GLOB SERPL: 1.4 (ref 1.1–2.2)
ALP SERPL-CCNC: 60 U/L (ref 45–117)
ALT SERPL-CCNC: 94 U/L (ref 12–78)
ANION GAP SERPL CALC-SCNC: 6 MMOL/L (ref 5–15)
AST SERPL-CCNC: 99 U/L (ref 15–37)
BASOPHILS # BLD: 0 K/UL (ref 0–0.1)
BASOPHILS NFR BLD: 1 % (ref 0–1)
BILIRUB SERPL-MCNC: 1.1 MG/DL (ref 0.2–1)
BUN SERPL-MCNC: 14 MG/DL (ref 6–20)
BUN/CREAT SERPL: 12 (ref 12–20)
CALCIUM SERPL-MCNC: 8.5 MG/DL (ref 8.5–10.1)
CHLORIDE SERPL-SCNC: 95 MMOL/L (ref 97–108)
CO2 SERPL-SCNC: 31 MMOL/L (ref 21–32)
CREAT SERPL-MCNC: 1.18 MG/DL (ref 0.55–1.02)
DIFFERENTIAL METHOD BLD: ABNORMAL
EKG ATRIAL RATE: 54 BPM
EKG DIAGNOSIS: NORMAL
EKG P AXIS: 11 DEGREES
EKG P-R INTERVAL: 258 MS
EKG Q-T INTERVAL: 516 MS
EKG QRS DURATION: 86 MS
EKG QTC CALCULATION (BAZETT): 489 MS
EKG R AXIS: 256 DEGREES
EKG T AXIS: 158 DEGREES
EKG VENTRICULAR RATE: 54 BPM
EOSINOPHIL # BLD: 0.3 K/UL (ref 0–0.4)
EOSINOPHIL NFR BLD: 4 % (ref 0–7)
ERYTHROCYTE [DISTWIDTH] IN BLOOD BY AUTOMATED COUNT: 13.7 % (ref 11.5–14.5)
FERRITIN SERPL-MCNC: 49 NG/ML (ref 26–388)
GLOBULIN SER CALC-MCNC: 2.1 G/DL (ref 2–4)
GLUCOSE SERPL-MCNC: 116 MG/DL (ref 65–100)
HCT VFR BLD AUTO: 43.8 % (ref 35–47)
HGB BLD-MCNC: 14.5 G/DL (ref 11.5–16)
IMM GRANULOCYTES # BLD AUTO: 0.1 K/UL (ref 0–0.04)
IMM GRANULOCYTES NFR BLD AUTO: 1 % (ref 0–0.5)
IRON SATN MFR SERPL: 11 % (ref 20–50)
IRON SERPL-MCNC: 36 UG/DL (ref 35–150)
LYMPHOCYTES # BLD: 1.1 K/UL (ref 0.8–3.5)
LYMPHOCYTES NFR BLD: 16 % (ref 12–49)
MAGNESIUM SERPL-MCNC: 2.1 MG/DL (ref 1.6–2.4)
MCH RBC QN AUTO: 29.2 PG (ref 26–34)
MCHC RBC AUTO-ENTMCNC: 33.1 G/DL (ref 30–36.5)
MCV RBC AUTO: 88.3 FL (ref 80–99)
MONOCYTES # BLD: 0.8 K/UL (ref 0–1)
MONOCYTES NFR BLD: 12 % (ref 5–13)
NEUTS SEG # BLD: 4.7 K/UL (ref 1.8–8)
NEUTS SEG NFR BLD: 68 % (ref 32–75)
NRBC # BLD: 0 K/UL (ref 0–0.01)
NRBC BLD-RTO: 0 PER 100 WBC
NT PRO BNP: 9274 PG/ML
PLATELET # BLD AUTO: 185 K/UL (ref 150–400)
PMV BLD AUTO: 10.3 FL (ref 8.9–12.9)
POTASSIUM SERPL-SCNC: 3.3 MMOL/L (ref 3.5–5.1)
PROT SERPL-MCNC: 5 G/DL (ref 6.4–8.2)
RBC # BLD AUTO: 4.96 M/UL (ref 3.8–5.2)
SODIUM SERPL-SCNC: 132 MMOL/L (ref 136–145)
T4 FREE SERPL-MCNC: 1.6 NG/DL (ref 0.8–1.5)
TIBC SERPL-MCNC: 333 UG/DL (ref 250–450)
WBC # BLD AUTO: 7 K/UL (ref 3.6–11)

## 2024-01-17 PROCEDURE — 6370000000 HC RX 637 (ALT 250 FOR IP): Performed by: FAMILY MEDICINE

## 2024-01-17 PROCEDURE — 2580000003 HC RX 258: Performed by: NURSE PRACTITIONER

## 2024-01-17 PROCEDURE — 83735 ASSAY OF MAGNESIUM: CPT

## 2024-01-17 PROCEDURE — 99233 SBSQ HOSP IP/OBS HIGH 50: CPT | Performed by: SPECIALIST

## 2024-01-17 PROCEDURE — 6370000000 HC RX 637 (ALT 250 FOR IP): Performed by: NURSE PRACTITIONER

## 2024-01-17 PROCEDURE — 99232 SBSQ HOSP IP/OBS MODERATE 35: CPT | Performed by: INTERNAL MEDICINE

## 2024-01-17 PROCEDURE — 83550 IRON BINDING TEST: CPT

## 2024-01-17 PROCEDURE — 36415 COLL VENOUS BLD VENIPUNCTURE: CPT

## 2024-01-17 PROCEDURE — 6370000000 HC RX 637 (ALT 250 FOR IP): Performed by: SPECIALIST

## 2024-01-17 PROCEDURE — 83540 ASSAY OF IRON: CPT

## 2024-01-17 PROCEDURE — 97116 GAIT TRAINING THERAPY: CPT | Performed by: PHYSICAL THERAPIST

## 2024-01-17 PROCEDURE — 80053 COMPREHEN METABOLIC PANEL: CPT

## 2024-01-17 PROCEDURE — 6360000002 HC RX W HCPCS: Performed by: NURSE PRACTITIONER

## 2024-01-17 PROCEDURE — 6370000000 HC RX 637 (ALT 250 FOR IP): Performed by: INTERNAL MEDICINE

## 2024-01-17 PROCEDURE — APPNB30 APP NON BILLABLE TIME 0-30 MINS: Performed by: NURSE PRACTITIONER

## 2024-01-17 PROCEDURE — 83880 ASSAY OF NATRIURETIC PEPTIDE: CPT

## 2024-01-17 PROCEDURE — 84439 ASSAY OF FREE THYROXINE: CPT

## 2024-01-17 PROCEDURE — 2580000003 HC RX 258: Performed by: FAMILY MEDICINE

## 2024-01-17 PROCEDURE — 2060000000 HC ICU INTERMEDIATE R&B

## 2024-01-17 PROCEDURE — 85025 COMPLETE CBC W/AUTO DIFF WBC: CPT

## 2024-01-17 PROCEDURE — 82728 ASSAY OF FERRITIN: CPT

## 2024-01-17 RX ORDER — POTASSIUM CHLORIDE 750 MG/1
40 TABLET, FILM COATED, EXTENDED RELEASE ORAL ONCE
Status: COMPLETED | OUTPATIENT
Start: 2024-01-17 | End: 2024-01-17

## 2024-01-17 RX ADMIN — METOPROLOL SUCCINATE 25 MG: 25 TABLET, EXTENDED RELEASE ORAL at 08:28

## 2024-01-17 RX ADMIN — SACUBITRIL AND VALSARTAN 1 TABLET: 24; 26 TABLET, FILM COATED ORAL at 20:38

## 2024-01-17 RX ADMIN — SODIUM CHLORIDE, PRESERVATIVE FREE 10 ML: 5 INJECTION INTRAVENOUS at 20:40

## 2024-01-17 RX ADMIN — AMIODARONE HYDROCHLORIDE 0.5 MG/MIN: 50 INJECTION, SOLUTION INTRAVENOUS at 06:59

## 2024-01-17 RX ADMIN — EMPAGLIFLOZIN 10 MG: 10 TABLET, FILM COATED ORAL at 09:10

## 2024-01-17 RX ADMIN — ATORVASTATIN CALCIUM 20 MG: 20 TABLET, FILM COATED ORAL at 08:28

## 2024-01-17 RX ADMIN — FOLIC ACID 1 MG: 1 TABLET ORAL at 08:28

## 2024-01-17 RX ADMIN — ALLOPURINOL 100 MG: 100 TABLET ORAL at 08:28

## 2024-01-17 RX ADMIN — APIXABAN 5 MG: 5 TABLET, FILM COATED ORAL at 20:38

## 2024-01-17 RX ADMIN — BUPROPION HYDROCHLORIDE 150 MG: 150 TABLET, FILM COATED, EXTENDED RELEASE ORAL at 08:28

## 2024-01-17 RX ADMIN — SPIRONOLACTONE 12.5 MG: 25 TABLET ORAL at 08:28

## 2024-01-17 RX ADMIN — AMIODARONE HYDROCHLORIDE 200 MG: 200 TABLET ORAL at 08:28

## 2024-01-17 RX ADMIN — LEVOTHYROXINE SODIUM 75 MCG: 0.07 TABLET ORAL at 06:59

## 2024-01-17 RX ADMIN — APIXABAN 5 MG: 5 TABLET, FILM COATED ORAL at 11:47

## 2024-01-17 RX ADMIN — SODIUM CHLORIDE, PRESERVATIVE FREE 10 ML: 5 INJECTION INTRAVENOUS at 08:28

## 2024-01-17 RX ADMIN — AMIODARONE HYDROCHLORIDE 200 MG: 200 TABLET ORAL at 14:21

## 2024-01-17 RX ADMIN — POTASSIUM CHLORIDE 40 MEQ: 750 TABLET, FILM COATED, EXTENDED RELEASE ORAL at 08:27

## 2024-01-17 RX ADMIN — Medication 2000 UNITS: at 08:28

## 2024-01-17 RX ADMIN — AMIODARONE HYDROCHLORIDE 200 MG: 200 TABLET ORAL at 20:38

## 2024-01-17 ASSESSMENT — ENCOUNTER SYMPTOMS
NAUSEA: 0
COUGH: 0
ABDOMINAL DISTENTION: 0
VOMITING: 0
SHORTNESS OF BREATH: 0

## 2024-01-17 NOTE — PROGRESS NOTES
She is normal weight.   Cardiovascular:      Rate and Rhythm: Normal rate and regular rhythm.      Pulses: Normal pulses.      Heart sounds: Normal heart sounds.   Pulmonary:      Effort: No respiratory distress.      Breath sounds: Normal breath sounds.   Abdominal:      General: There is no distension.      Palpations: Abdomen is soft.   Musculoskeletal:         General: No swelling.   Skin:     General: Skin is warm and dry.   Neurological:      General: No focal deficit present.      Mental Status: She is alert and oriented to person, place, and time.   Psychiatric:         Mood and Affect: Mood normal.            PAST MEDICAL HISTORY:  Past Medical History:   Diagnosis Date    Hypertension     Menopause     LMP-late 40s?    Thyroid disease        PAST SURGICAL HISTORY:  Past Surgical History:   Procedure Laterality Date    CARDIAC PROCEDURE N/A 1/16/2024    Left and right heart cath / coronary angiography performed by Will Ray MD at [facility] CARDIAC CATH LAB       FAMILY HISTORY:  Family History   Problem Relation Age of Onset    Heart Failure Father        SOCIAL HISTORY:  Social History     Socioeconomic History    Marital status:      Spouse name: None    Number of children: None    Years of education: None    Highest education level: None   Tobacco Use    Smoking status: Never    Smokeless tobacco: Never   Vaping Use    Vaping Use: Never used   Substance and Sexual Activity    Alcohol use: Never    Drug use: Never     Social Determinants of Health     Food Insecurity: No Food Insecurity (1/15/2024)    Hunger Vital Sign     Worried About Running Out of Food in the Last Year: Never true     Ran Out of Food in the Last Year: Never true   Transportation Needs: No Transportation Needs (1/15/2024)    PRAPARE - Transportation     Lack of Transportation (Medical): No     Lack of Transportation (Non-Medical): No   Housing Stability: Low Risk  (1/15/2024)    Housing Stability Vital Sign     Unable to Pay for

## 2024-01-17 NOTE — PLAN OF CARE
Problem: Physical Therapy - Adult  Goal: By Discharge: Performs mobility at highest level of function for planned discharge setting.  See evaluation for individualized goals.  Description: FUNCTIONAL STATUS PRIOR TO ADMISSION: Patient was independent and active without use of DME.    HOME SUPPORT PRIOR TO ADMISSION: The patient lived alone with no local support.    Physical Therapy Goals  Initiated 1/16/2024  1.  Patient will move from supine to sit and sit to supine and roll side to side in bed with modified independence within 7 day(s).    2.  Patient will perform sit to stand with modified independence within 7 day(s).  3.  Patient will transfer from bed to chair and chair to bed with modified independence using the least restrictive device within 7 day(s).  4.  Patient will ambulate with modified independence for 150 feet with the least restrictive device within 7 day(s).   5.  Patient will ascend/descend 4 stairs with single handrail(s) with supervision/set-up within 7 day(s).    Outcome: Progressing     PHYSICAL THERAPY TREATMENT    Patient: Frannie Blake (78 y.o. female)  Date: 1/17/2024  Diagnosis: Acute on chronic combined systolic (congestive) and diastolic (congestive) heart failure (HCC) [I50.43]  New onset of congestive heart failure (HCC) [I50.9] Acute on chronic combined systolic (congestive) and diastolic (congestive) heart failure (HCC)  Procedure(s) (LRB):  Left and right heart cath / coronary angiography (N/A) 1 Day Post-Op  Precautions: Fall Risk                      ASSESSMENT:  Patient continues to benefit from skilled PT services and is progressing towards goals. The patient is up in the chair on arrival.  Her cousin and  friend are present and helped the patient with her hair this morning.  She is able to stand and ambulated to the bathroom and toileted and washed her hands with supervision.  She ambulated in the hallway for 150 feet occasionally holding the railing.  She is fairly  steady and ambulated slowly.  No SOB or fatigue with this exertion.  BP post walk is 121/78.           PLAN:  Patient continues to benefit from skilled intervention to address the above impairments.  Continue treatment per established plan of care.    Recommendation for discharge: (in order for the patient to meet his/her long term goals): Therapy 3 hours/day 5-7 days/weekI     Other factors to consider for discharge: lives alone and concern for safely navigating or managing the home environment    IF patient discharges home will need the following DME: none       SUBJECTIVE:   Patient stated, \"I am going to rehab because I live alone and my daughters are worried about me.\"    OBJECTIVE DATA SUMMARY:   Critical Behavior:   Oriented x 4.        Functional Mobility Training:  Bed Mobility:     Transfers:  Transfer Training  Overall Level of Assistance: Supervision  Sit to Stand: Supervision  Stand to Sit: Supervision  Stand Pivot Transfers: Supervision  Bed to Chair: Supervision  Balance:  Balance  Sitting: Intact  Standing: Intact (occasional support)   Ambulation/Gait Training:     Gait  Overall Level of Assistance: Stand-by assistance  Distance (ft): 150 Feet  Assistive Device: Gait belt  Base of Support: Narrowed  Speed/Nikia: Pace decreased (< 100 feet/min)  Step Length: Left shortened;Right shortened  Activity Tolerance:   Good    After treatment:   Patient left in no apparent distress sitting up in chair, Call bell within reach, and Caregiver / family present      COMMUNICATION/EDUCATION:   The patient's plan of care was discussed with: registered nurse    Patient Education  Education Given To: Family  Education Provided: Role of Therapy;Plan of Care;Home Exercise Program      Greer Cristobal, PT  Minutes: 17

## 2024-01-17 NOTE — PLAN OF CARE
Problem: Safety - Adult  Goal: Free from fall injury  Outcome: Progressing  Flowsheets (Taken 1/16/2024 2244)  Free From Fall Injury: Instruct family/caregiver on patient safety     Problem: Discharge Planning  Goal: Discharge to home or other facility with appropriate resources  Outcome: Progressing  Flowsheets (Taken 1/16/2024 2000)  Discharge to home or other facility with appropriate resources: Identify barriers to discharge with patient and caregiver     Problem: Chronic Conditions and Co-morbidities  Goal: Patient's chronic conditions and co-morbidity symptoms are monitored and maintained or improved  Outcome: Progressing  Flowsheets (Taken 1/16/2024 2000)  Care Plan - Patient's Chronic Conditions and Co-Morbidity Symptoms are Monitored and Maintained or Improved: Monitor and assess patient's chronic conditions and comorbid symptoms for stability, deterioration, or improvement     Problem: Skin/Tissue Integrity  Goal: Absence of new skin breakdown  Description: 1.  Monitor for areas of redness and/or skin breakdown  2.  Assess vascular access sites hourly  3.  Every 4-6 hours minimum:  Change oxygen saturation probe site  4.  Every 4-6 hours:  If on nasal continuous positive airway pressure, respiratory therapy assess nares and determine need for appliance change or resting period.  Outcome: Progressing

## 2024-01-17 NOTE — CARE COORDINATION
JENIFFER PLAN:  RUR-12%    CM met with patient as requested. CM called daughter Haylie Welch ([phone]) to discuss discharge planning. CM discussed disposition choices, IPR vs SNF vs HHC. Daughter also said that the family is already looking into [facility] for her mother lily to ensure that she is following her medication regime which was a problem resulting in this admission. The daughter said she would like to discuss their options with her family and will call back tomorrow with their preference. JOHN Mast MSA, RN,CM

## 2024-01-17 NOTE — PROGRESS NOTES
[facility] CARDIOLOGY  Cardiology Care Note                  []Initial visit     [x]Established visit     Patient Name: Frannie Blake - :1945 - MRN:[id number]  Primary Cardiologist: None  Dr. Abram Mcguire (new pt)     Reason for initial visit:  new onset CHF, elevated trop of 177      SUBJECTIVE:   Denies chest pain, SOB, dizziness, palpitations. Remains in NSR- Sinus bradycardia, 1st degree AVB (HR low of 56 occasionally).   Wt is up 1 lb/24 hours.  Creatinine improved to 1.18 today.  LHC yesterday showed CAD, nonobstructive and RHC with normal Right and left sided filling pressures.Hgb stable 14.5. BNP trended down to 9274.      Assessment and Plan   Patient seen on the day of progress note and examined  and agree with Advance Practice Provider (MARIAH, NP,PA)  assessment and plans.  She feels good today no complaints after cardiac catheterization yesterday.    Nonischemic cardiomyopathy based on results of cardiac catheterization.  This could be a stress cardiomyopathy.  Continue with a small dose of Toprol spironolactone and Jardiance at this time.  Start Entresto later on today.  Blood pressure seems to be reasonable at this point.    She remains for now in normal sinus rhythm from atrial flutter.    Continue amiodarone 3 times a day for now.  Start Eliquis 5 mg p.o. twice daily.    From a CAD standpoint she does have a 60% stenosis of the LAD patient already on Eliquis and aspirin on hold for now continue Lipitor and Toprol.    Follow creatinine after initiation of Entresto.   Nonischemic cardiomyopathy/Acute HFrEF:  EF 15-20%, apical/basal segments may be slightly hyperdynamic on LV, Continue Toprol XL,  AHF added jardiance and spironolactone.  Consider entresto as bp allows (creatinine improved). Wt up 1 lb/24 hours but  Right heart cath yesterday with normal filling pressures.       Atrial flutter:  now back in NSR  (Peripheral Line), 10 mEq, IntraVENous, PRN, Prakash Parker MD    magnesium sulfate 2000 mg in 50 mL IVPB premix, 2,000 mg, IntraVENous, PRN, Prakash Parker MD    Vitamin D (CHOLECALCIFEROL) tablet 2,000 Units, 2,000 Units, Oral, Daily, Prakash Parker MD, 2,000 Units at 01/17/24 0828    HILARIO Mortensen - NP    [facility] Cardiology  Call center: (P) [phone]  (F) [phone]

## 2024-01-17 NOTE — NURSE NAVIGATOR
Heart Failure Nurse Navigator rounds completed.    HF NN provided introduction to self and nurse navigator role. Living With Heart Failure booklet given to patient, along with weight calendar, signs/symptoms magnet, and card with QR codes for HF video resources.       Reviewed heart failure signs and symptoms and when to call the cardiologist.   Reviewed daily weights and daily weight calendar.  Reviewed heart failure zones.  Reviewed low salt diet. Reviewed heart failure video QR code and demonstrated how to use it.     Advised patient that follow up appointment will be scheduled and placed on Discharge Instructions prior to discharge. Patient is planning to go to a SNF for therapy prior to going home. Patient given [facility] flyer.  Will continue to follow until discharge.        Time spent with patient discussing HF education:  20 minutes

## 2024-01-17 NOTE — PROGRESS NOTES
[facility] Adult  Hospitalist Group                                                                                          Hospitalist Progress Note  Nick De Oliveira MD  Office Phone: [phone]        Date of Service:  2024  NAME:  Frannie Blake  :  1945  MRN:  [id number]       Admission Summary:   Frannie Blake is a 78 y.o. female PMH HTN, hyperlipidemia, hypothyroid on synthroid who was sent in by PCP Heidy Perez due to ongoing symptoms past 3-4 weeks, mild edema lower extremities which she attributed to trazodone which she is now off of with some improvement (had overt rash allergic rxn) as well as worsening fatigue with no strength to walk up stairs also worsening over past 3-4 weeks. She states after 2 steps she will need to stop and rest.  States \"I just have no stamina\" This am she states she was completely breathless after 3 steos so contacted PCP and she rec she proceed to ED for eval. She also notes a couple weeks ago she was having trouble sleeping, but denies that it was do to PND and states she just couldn't sleep.  This was the reason for the trazooone, but she states its better since stopping the trazodone.  She denies chest pain, headaches, vision changes, shortness of breath at rest, nausea, vomiting, fevers, sick contacts, every time I ask her a ROS question she replies \"I just have no stamina\". She denies h/o tobacco or etoh use and denies any h/o illicit drugs as well.        Interval history / Subjective:   Follow up CHF   S/p Cardiac cath   Now in NSR  Family in the room  Assessment & Plan:     # Acute on chronic (suspected chronic diastolic and systolic due to CTA with increased pulmonary artery vasculature as well as bilateral pleural effusions noted in addition to transaminitis suggestive of congestive process)  - Lasix IV bid  - Strict input and output  - Daily weights  - Echo peEF 15-20% with severe global hypokinesis  - Continue beta  tablet Oral BID    allopurinol (ZYLOPRIM) tablet 100 mg  100 mg Oral Daily    spironolactone (ALDACTONE) tablet 12.5 mg  12.5 mg Oral Daily    amiodarone (CORDARONE) tablet 200 mg  200 mg Oral TID    furosemide (LASIX) injection 40 mg  40 mg IntraVENous Once    buPROPion (WELLBUTRIN SR) extended release tablet 150 mg  150 mg Oral Daily    folic acid (FOLVITE) tablet 1 mg  1 mg Oral Daily    levothyroxine (SYNTHROID) tablet 75 mcg  75 mcg Oral Daily    metoprolol succinate (TOPROL XL) extended release tablet 25 mg  25 mg Oral Daily    atorvastatin (LIPITOR) tablet 20 mg  20 mg Oral Daily    sodium chloride flush 0.9 % injection 5-40 mL  5-40 mL IntraVENous 2 times per day    sodium chloride flush 0.9 % injection 5-40 mL  5-40 mL IntraVENous PRN    0.9 % sodium chloride infusion   IntraVENous PRN    ondansetron (ZOFRAN-ODT) disintegrating tablet 4 mg  4 mg Oral Q8H PRN    Or    ondansetron (ZOFRAN) injection 4 mg  4 mg IntraVENous Q6H PRN    polyethylene glycol (GLYCOLAX) packet 17 g  17 g Oral Daily PRN    acetaminophen (TYLENOL) tablet 650 mg  650 mg Oral Q6H PRN    Or    acetaminophen (TYLENOL) suppository 650 mg  650 mg Rectal Q6H PRN    potassium chloride 10 mEq/100 mL IVPB (Peripheral Line)  10 mEq IntraVENous PRN    magnesium sulfate 2000 mg in 50 mL IVPB premix  2,000 mg IntraVENous PRN    Vitamin D (CHOLECALCIFEROL) tablet 2,000 Units  2,000 Units Oral Daily     ______________________________________________________________________  EXPECTED LENGTH OF STAY: 4  ACTUAL LENGTH OF STAY:          3                 Nick De Oliveira MD

## 2024-01-18 LAB
ALBUMIN SERPL-MCNC: 2.8 G/DL (ref 3.5–5)
ALBUMIN/GLOB SERPL: 1.3 (ref 1.1–2.2)
ALP SERPL-CCNC: 64 U/L (ref 45–117)
ALT SERPL-CCNC: 82 U/L (ref 12–78)
ANION GAP SERPL CALC-SCNC: 9 MMOL/L (ref 5–15)
AST SERPL-CCNC: 75 U/L (ref 15–37)
BASOPHILS # BLD: 0 K/UL (ref 0–0.1)
BASOPHILS NFR BLD: 1 % (ref 0–1)
BILIRUB SERPL-MCNC: 0.9 MG/DL (ref 0.2–1)
BUN SERPL-MCNC: 12 MG/DL (ref 6–20)
BUN/CREAT SERPL: 11 (ref 12–20)
CALCIUM SERPL-MCNC: 8.7 MG/DL (ref 8.5–10.1)
CHLORIDE SERPL-SCNC: 101 MMOL/L (ref 97–108)
CO2 SERPL-SCNC: 28 MMOL/L (ref 21–32)
CREAT SERPL-MCNC: 1.1 MG/DL (ref 0.55–1.02)
DIFFERENTIAL METHOD BLD: NORMAL
EOSINOPHIL # BLD: 0.2 K/UL (ref 0–0.4)
EOSINOPHIL NFR BLD: 3 % (ref 0–7)
ERYTHROCYTE [DISTWIDTH] IN BLOOD BY AUTOMATED COUNT: 14.1 % (ref 11.5–14.5)
GLOBULIN SER CALC-MCNC: 2.2 G/DL (ref 2–4)
GLUCOSE SERPL-MCNC: 110 MG/DL (ref 65–100)
HCT VFR BLD AUTO: 43.2 % (ref 35–47)
HGB BLD-MCNC: 14.5 G/DL (ref 11.5–16)
IMM GRANULOCYTES # BLD AUTO: 0 K/UL (ref 0–0.04)
IMM GRANULOCYTES NFR BLD AUTO: 0 % (ref 0–0.5)
LYMPHOCYTES # BLD: 1.2 K/UL (ref 0.8–3.5)
LYMPHOCYTES NFR BLD: 20 % (ref 12–49)
MAGNESIUM SERPL-MCNC: 2.2 MG/DL (ref 1.6–2.4)
MCH RBC QN AUTO: 29.5 PG (ref 26–34)
MCHC RBC AUTO-ENTMCNC: 33.6 G/DL (ref 30–36.5)
MCV RBC AUTO: 87.8 FL (ref 80–99)
MONOCYTES # BLD: 0.6 K/UL (ref 0–1)
MONOCYTES NFR BLD: 11 % (ref 5–13)
NEUTS SEG # BLD: 3.8 K/UL (ref 1.8–8)
NEUTS SEG NFR BLD: 65 % (ref 32–75)
NRBC # BLD: 0 K/UL (ref 0–0.01)
NRBC BLD-RTO: 0 PER 100 WBC
PLATELET # BLD AUTO: 154 K/UL (ref 150–400)
PMV BLD AUTO: 10.1 FL (ref 8.9–12.9)
POTASSIUM SERPL-SCNC: 4.3 MMOL/L (ref 3.5–5.1)
PROT SERPL-MCNC: 5 G/DL (ref 6.4–8.2)
RBC # BLD AUTO: 4.92 M/UL (ref 3.8–5.2)
SODIUM SERPL-SCNC: 138 MMOL/L (ref 136–145)
WBC # BLD AUTO: 5.9 K/UL (ref 3.6–11)

## 2024-01-18 PROCEDURE — 2060000000 HC ICU INTERMEDIATE R&B

## 2024-01-18 PROCEDURE — 6370000000 HC RX 637 (ALT 250 FOR IP): Performed by: NURSE PRACTITIONER

## 2024-01-18 PROCEDURE — 2580000003 HC RX 258: Performed by: FAMILY MEDICINE

## 2024-01-18 PROCEDURE — 6370000000 HC RX 637 (ALT 250 FOR IP): Performed by: INTERNAL MEDICINE

## 2024-01-18 PROCEDURE — 83735 ASSAY OF MAGNESIUM: CPT

## 2024-01-18 PROCEDURE — 6370000000 HC RX 637 (ALT 250 FOR IP): Performed by: SPECIALIST

## 2024-01-18 PROCEDURE — 80053 COMPREHEN METABOLIC PANEL: CPT

## 2024-01-18 PROCEDURE — 36415 COLL VENOUS BLD VENIPUNCTURE: CPT

## 2024-01-18 PROCEDURE — 99233 SBSQ HOSP IP/OBS HIGH 50: CPT | Performed by: INTERNAL MEDICINE

## 2024-01-18 PROCEDURE — 6370000000 HC RX 637 (ALT 250 FOR IP): Performed by: FAMILY MEDICINE

## 2024-01-18 PROCEDURE — 97116 GAIT TRAINING THERAPY: CPT

## 2024-01-18 PROCEDURE — 97535 SELF CARE MNGMENT TRAINING: CPT

## 2024-01-18 PROCEDURE — 85025 COMPLETE CBC W/AUTO DIFF WBC: CPT

## 2024-01-18 RX ORDER — ASPIRIN 81 MG/1
81 TABLET, CHEWABLE ORAL DAILY
Status: DISCONTINUED | OUTPATIENT
Start: 2024-01-18 | End: 2024-01-20 | Stop reason: HOSPADM

## 2024-01-18 RX ADMIN — Medication 2000 UNITS: at 08:17

## 2024-01-18 RX ADMIN — ASPIRIN 81 MG: 81 TABLET, CHEWABLE ORAL at 11:01

## 2024-01-18 RX ADMIN — FOLIC ACID 1 MG: 1 TABLET ORAL at 08:17

## 2024-01-18 RX ADMIN — EMPAGLIFLOZIN 10 MG: 10 TABLET, FILM COATED ORAL at 08:17

## 2024-01-18 RX ADMIN — SACUBITRIL AND VALSARTAN 1 TABLET: 24; 26 TABLET, FILM COATED ORAL at 08:17

## 2024-01-18 RX ADMIN — SODIUM CHLORIDE, PRESERVATIVE FREE 10 ML: 5 INJECTION INTRAVENOUS at 20:20

## 2024-01-18 RX ADMIN — LEVOTHYROXINE SODIUM 75 MCG: 0.07 TABLET ORAL at 06:48

## 2024-01-18 RX ADMIN — ALLOPURINOL 100 MG: 100 TABLET ORAL at 08:17

## 2024-01-18 RX ADMIN — SODIUM CHLORIDE, PRESERVATIVE FREE 10 ML: 5 INJECTION INTRAVENOUS at 08:18

## 2024-01-18 RX ADMIN — SACUBITRIL AND VALSARTAN 1 TABLET: 24; 26 TABLET, FILM COATED ORAL at 20:19

## 2024-01-18 RX ADMIN — APIXABAN 5 MG: 5 TABLET, FILM COATED ORAL at 08:55

## 2024-01-18 RX ADMIN — BUPROPION HYDROCHLORIDE 150 MG: 150 TABLET, FILM COATED, EXTENDED RELEASE ORAL at 08:17

## 2024-01-18 RX ADMIN — APIXABAN 5 MG: 5 TABLET, FILM COATED ORAL at 20:19

## 2024-01-18 RX ADMIN — ATORVASTATIN CALCIUM 20 MG: 20 TABLET, FILM COATED ORAL at 08:18

## 2024-01-18 RX ADMIN — SPIRONOLACTONE 12.5 MG: 25 TABLET ORAL at 08:17

## 2024-01-18 NOTE — PROGRESS NOTES
0730: Bedside and Verbal shift change report given to Cierra RN (oncoming nurse) by Amalia RN (offgoing nurse). Report included the following information Nurse Handoff Report, Index, Adult Overview, Intake/Output, MAR, Recent Results, and Cardiac Rhythm Normal Sinus .

## 2024-01-18 NOTE — PLAN OF CARE
Problem: Occupational Therapy - Adult  Goal: By Discharge: Performs self-care activities at highest level of function for planned discharge setting.  See evaluation for individualized goals.  Description: FUNCTIONAL STATUS PRIOR TO ADMISSION:  Pt lived alone in a multi-story home with 3 FILIPPO. Pt reports she can live on the main level with the exception of laundry in basement. Pt was independent with BADLs/IADLs at baseline without the use of AE. Pt is a  and spends her time at [employer]. Pt has a daughter that lives 3+ hours away.    HOME SUPPORT: Patient lived alone with no local support.    Occupational Therapy Goals:  Initiated 1/16/2024  1.  Patient will perform grooming standing at sink with Modified [place] within 7 day(s).  2.  Patient will perform lower body dressing with Modified [place] within 7 day(s).  3.  Patient will perform shower transfer with Modified [place] within 7 day(s).  4.  Patient will perform toilet transfers with Modified [place]  within 7 day(s).  5.  Patient will perform all aspects of toileting with Modified [place] within 7 day(s).  6.  Patient will participate in upper extremity therapeutic exercise/activities with Modified [place] for 10 minutes within 7 day(s).    7.  Patient will utilize energy conservation techniques during functional activities with verbal cues within 7 day(s).      Outcome: Progressing   OCCUPATIONAL THERAPY TREATMENT  Patient: Frannie Blake (78 y.o. female)  Date: 1/18/2024  Primary Diagnosis: Acute on chronic combined systolic (congestive) and diastolic (congestive) heart failure (HCC) [I50.43]  New onset of congestive heart failure (HCC) [I50.9]  Procedure(s) (LRB):  Left and right heart cath / coronary angiography (N/A) 2 Days Post-Op   Precautions: Fall Risk                Chart, occupational therapy assessment, plan of care, and goals were reviewed.    ASSESSMENT  Patient continues to benefit from skilled OT  services and is progressing towards goals. Pt demonstrating ability to complete functional transfers Spv with HR in 60s. Pt completing dynamic balance task with no LOB. Educated on home safety and safety when completing IADLs (currently laundry is located in the basement). Recommend spv for medication management due to impaired cognition. Pt with variable understanding as she started talking about putting a ramp over steps to get into her home. Her cousin present during session and verbalized understanding of recommendations and taking notes. Pt left seated in recliner with all needs met and chair alarm.            PLAN :  Patient continues to benefit from skilled intervention to address the above impairments.  Continue treatment per established plan of care to address goals.    Recommend with staff: Recommend with nursing, ADLs with supervision, OOB to chair 3x/day, and toileting via functional mobility to and from bathroom. Thank you for completing as able in order to maintain patient strength, endurance and independence.       Recommend next OT session: MMSE/MOCA    Recommendation for discharge: (in order for the patient to meet his/her long term goals): HH at [facility].     Other factors to consider for discharge: lives alone, no local support, impaired cognition, high risk for falls, concern for safely navigating or managing the home environment, and requiring assistance for IADLs.    IF patient discharges home will need the following DME: none       SUBJECTIVE:   Patient stated “I feel so much better than I did even a few days ago.”    OBJECTIVE DATA SUMMARY:   Cognitive/Behavioral Status:     Cognition  Overall Cognitive Status: Exceptions  Arousal/Alertness: Appropriate responses to stimuli  Following Commands: Follows one step commands consistently  Attention Span: Attends with cues to redirect  Safety Judgement: Decreased awareness of need for safety  Problem Solving: Decreased awareness of errors  Insights:

## 2024-01-18 NOTE — PROGRESS NOTES
[facility]                                                                                          Hospitalist Progress Note  Otilio Finley MD  Office Phone: [phone]        Date of Service:  2024  NAME:  Frannie Blake  :  1945  MRN:  [id number]       Admission Summary:   Frannie Blake is a 78 y.o. female PMH HTN, hyperlipidemia, hypothyroid on synthroid who was sent in by PCP Heidy Perez due to ongoing symptoms past 3-4 weeks, mild edema lower extremities which she attributed to trazodone which she is now off of with some improvement (had overt rash allergic rxn) as well as worsening fatigue with no strength to walk up stairs also worsening over past 3-4 weeks. She states after 2 steps she will need to stop and rest.  States \"I just have no stamina\" This am she states she was completely breathless after 3 steos so contacted PCP and she rec she proceed to ED for eval. She also notes a couple weeks ago she was having trouble sleeping, but denies that it was do to PND and states she just couldn't sleep.  This was the reason for the trazooone, but she states its better since stopping the trazodone.  She denies chest pain, headaches, vision changes, shortness of breath at rest, nausea, vomiting, fevers, sick contacts, every time I ask her a ROS question she replies \"I just have no stamina\". She denies h/o tobacco or etoh use and denies any h/o illicit drugs as well          Interval history / Subjective:     Patient family requesting IPR.     Assessment & Plan:           Heart failure with reduced ejection fraction  Nonischemic cardiomyopathy  Pulmonary hypertension due to above  Pleural effusion due to above  History of atrial flutter  Type II troponin elevation  Non obstructive CAD  Sinus bradycardia  Hypertension  TTE 1/15/2024 with a EF 15 to 20%.  TAPSE abnormal.  Severe global hypokinesis.  CTA chest with moderate bilateral pleural

## 2024-01-18 NOTE — PROGRESS NOTES
[facility]  Cardiology Care Note                  []Initial visit     [x]Established visit     Patient Name: Frannie Blake - :1945 - MRN:[id number]  Primary Cardiologist: None  Dr. Abram Mcguire (new pt)         Assessment/Plan/Discussion:Cardiology Attendin2024 12:21 PM     Patient seen on the day of progress note and examined  reviewed  with Advance Practice Provider (MARIAH, NP,PA)     Time spent on patient's care >60% of entire combined MD/MARIAH evaluation/treatment    ASSESSMENT  Nonischemic cardiomyopathy with ejection fraction of 15 to 20% may be stress-induced cardiomyopathy  Nonobstructive coronary artery disease LAD 60% stenosis  Atrial flutter now back in normal sinus rhythm  Elevated troponin due to cardiomyopathy  Hyperlipidemia        PLAN   Patient shortness of breath is much better today.  Has newly discovered dilated cardiomyopathy ejection fraction of 15 to 20% currently on Lasix  Regarding guideline mediated heart failure therapy continue Jardiance, spironolactone and Entresto  Will hold Toprol-XL due to bradycardia  Patient has history of paroxysmal atrial flutter now in normal sinus rhythm.  Will hold amiodarone due to bradycardia continue Eliquis 5 mg p.o. twice daily for stroke prophylaxis.  Outpatient [facility] LifeVest and follow-up with heart failure clinic  Regarding nonobstructive CAD continue aspirin along with Lipitor for hyperlipidemia    Rest as MARIAH    High Risk Therapy requiring intensive monitoring for toxicity / side effects:    - IV diuresis requiring electrolyte monitoring - high risk medication for electrolyte disorder, arrhythmia      Brief History :Frannie Blake is a 78 y.o. female with Past medical history of Hypertension, hypothyroidism, first-degree AV block, presented to the hospital with worsening shortness of breath with occasional chest pain was found to have

## 2024-01-18 NOTE — PLAN OF CARE
Problem: Physical Therapy - Adult  Goal: By Discharge: Performs mobility at highest level of function for planned discharge setting.  See evaluation for individualized goals.  Description: FUNCTIONAL STATUS PRIOR TO ADMISSION: Patient was independent and active without use of DME.    HOME SUPPORT PRIOR TO ADMISSION: The patient lived alone with no local support.    Physical Therapy Goals  Initiated 1/16/2024  1.  Patient will move from supine to sit and sit to supine and roll side to side in bed with modified independence within 7 day(s).    2.  Patient will perform sit to stand with modified independence within 7 day(s).  3.  Patient will transfer from bed to chair and chair to bed with modified independence using the least restrictive device within 7 day(s).  4.  Patient will ambulate with modified independence for 150 feet with the least restrictive device within 7 day(s).   5.  Patient will ascend/descend 4 stairs with single handrail(s) with supervision/set-up within 7 day(s).    Outcome: Progressing   PHYSICAL THERAPY TREATMENT    Patient: Frannie Blake (78 y.o. female)  Date: 1/18/2024  Diagnosis: Acute on chronic combined systolic (congestive) and diastolic (congestive) heart failure (HCC) [I50.43]  New onset of congestive heart failure (HCC) [I50.9] Acute on chronic combined systolic (congestive) and diastolic (congestive) heart failure (HCC)  Procedure(s) (LRB):  Left and right heart cath / coronary angiography (N/A) 2 Days Post-Op  Precautions: Fall Risk                      ASSESSMENT:  Patient continues to benefit from skilled PT services and is progressing towards goals. Indep with functional mobility limited by impaired cognition and mild balance/ gait instability. Pt received with supportive family present, Life vest present but not yet fitted to patient. Pt mobilized with SBA for transfers and ambulation on unit; noted decreased step length bilat.     Pt unsafe to return home alone and will

## 2024-01-18 NOTE — CARE COORDINATION
Transition of Care Plan:    Accepted at [facility]    RUR: 12%  Prior Level of Functioning: Independent  Disposition: IPR vs SNF  If SNF or IPR: Date FOC offered: 1/17/24  Date FOC received: 1/18/24  Accepting facility:[facility]  Follow up appointments:   DME needed:   Transportation at discharge: Family vs BLS  IM/IMM Medicare/ letter given:   Caregiver Contact: Haylie Welch [phone]  Discharge Caregiver contacted prior to discharge?   Care Conference needed? No  Barriers to discharge: Awaiting placement, Lifevest placement    CM received a call from patient's daughter Haylie Welch regarding choice of IPR and SNF, they prefer [facility] and [facility].  Referrals sent to [facility] and [facility]    CM also spoke with Flaca Magallanes regarding a Lifevest. He said patient will be fitted today. JOHN Mast MSA, RN, CM.     1:35 pm. CM received a call from Adm Zeinab Escobedo, she said patient has been accepted to [facility]. JOHN Mast MSA, RN, CM

## 2024-01-19 LAB
ALBUMIN SERPL-MCNC: 2.9 G/DL (ref 3.5–5)
ALBUMIN/GLOB SERPL: 1.3 (ref 1.1–2.2)
ALP SERPL-CCNC: 66 U/L (ref 45–117)
ALT SERPL-CCNC: 76 U/L (ref 12–78)
ANION GAP SERPL CALC-SCNC: 9 MMOL/L (ref 5–15)
AST SERPL-CCNC: 64 U/L (ref 15–37)
BILIRUB SERPL-MCNC: 0.9 MG/DL (ref 0.2–1)
BUN SERPL-MCNC: 15 MG/DL (ref 6–20)
BUN/CREAT SERPL: 14 (ref 12–20)
CALCIUM SERPL-MCNC: 9.1 MG/DL (ref 8.5–10.1)
CHLORIDE SERPL-SCNC: 104 MMOL/L (ref 97–108)
CO2 SERPL-SCNC: 26 MMOL/L (ref 21–32)
CREAT SERPL-MCNC: 1.04 MG/DL (ref 0.55–1.02)
GLOBULIN SER CALC-MCNC: 2.3 G/DL (ref 2–4)
GLUCOSE SERPL-MCNC: 80 MG/DL (ref 65–100)
MAGNESIUM SERPL-MCNC: 2.1 MG/DL (ref 1.6–2.4)
POTASSIUM SERPL-SCNC: 4.2 MMOL/L (ref 3.5–5.1)
PROT SERPL-MCNC: 5.2 G/DL (ref 6.4–8.2)
SODIUM SERPL-SCNC: 139 MMOL/L (ref 136–145)

## 2024-01-19 PROCEDURE — 99233 SBSQ HOSP IP/OBS HIGH 50: CPT | Performed by: INTERNAL MEDICINE

## 2024-01-19 PROCEDURE — 36415 COLL VENOUS BLD VENIPUNCTURE: CPT

## 2024-01-19 PROCEDURE — 6370000000 HC RX 637 (ALT 250 FOR IP): Performed by: NURSE PRACTITIONER

## 2024-01-19 PROCEDURE — 6370000000 HC RX 637 (ALT 250 FOR IP): Performed by: FAMILY MEDICINE

## 2024-01-19 PROCEDURE — 80053 COMPREHEN METABOLIC PANEL: CPT

## 2024-01-19 PROCEDURE — 97116 GAIT TRAINING THERAPY: CPT

## 2024-01-19 PROCEDURE — 6370000000 HC RX 637 (ALT 250 FOR IP): Performed by: SPECIALIST

## 2024-01-19 PROCEDURE — 6370000000 HC RX 637 (ALT 250 FOR IP): Performed by: INTERNAL MEDICINE

## 2024-01-19 PROCEDURE — 2060000000 HC ICU INTERMEDIATE R&B

## 2024-01-19 PROCEDURE — 83735 ASSAY OF MAGNESIUM: CPT

## 2024-01-19 PROCEDURE — 2580000003 HC RX 258: Performed by: FAMILY MEDICINE

## 2024-01-19 RX ORDER — FUROSEMIDE 20 MG/1
20 TABLET ORAL DAILY PRN
Status: DISCONTINUED | OUTPATIENT
Start: 2024-01-19 | End: 2024-01-20 | Stop reason: HOSPADM

## 2024-01-19 RX ORDER — METOPROLOL SUCCINATE 25 MG/1
25 TABLET, EXTENDED RELEASE ORAL
Status: DISCONTINUED | OUTPATIENT
Start: 2024-01-19 | End: 2024-01-20 | Stop reason: HOSPADM

## 2024-01-19 RX ADMIN — FOLIC ACID 1 MG: 1 TABLET ORAL at 08:34

## 2024-01-19 RX ADMIN — SPIRONOLACTONE 12.5 MG: 25 TABLET ORAL at 08:36

## 2024-01-19 RX ADMIN — LEVOTHYROXINE SODIUM 75 MCG: 0.07 TABLET ORAL at 06:47

## 2024-01-19 RX ADMIN — Medication 2000 UNITS: at 08:33

## 2024-01-19 RX ADMIN — SACUBITRIL AND VALSARTAN 1 TABLET: 24; 26 TABLET, FILM COATED ORAL at 20:57

## 2024-01-19 RX ADMIN — EMPAGLIFLOZIN 10 MG: 10 TABLET, FILM COATED ORAL at 08:34

## 2024-01-19 RX ADMIN — SACUBITRIL AND VALSARTAN 1 TABLET: 24; 26 TABLET, FILM COATED ORAL at 08:36

## 2024-01-19 RX ADMIN — APIXABAN 5 MG: 5 TABLET, FILM COATED ORAL at 08:34

## 2024-01-19 RX ADMIN — METOPROLOL SUCCINATE 25 MG: 25 TABLET, EXTENDED RELEASE ORAL at 12:05

## 2024-01-19 RX ADMIN — BUPROPION HYDROCHLORIDE 150 MG: 150 TABLET, FILM COATED, EXTENDED RELEASE ORAL at 08:34

## 2024-01-19 RX ADMIN — ATORVASTATIN CALCIUM 20 MG: 20 TABLET, FILM COATED ORAL at 08:34

## 2024-01-19 RX ADMIN — SODIUM CHLORIDE, PRESERVATIVE FREE 10 ML: 5 INJECTION INTRAVENOUS at 08:34

## 2024-01-19 RX ADMIN — APIXABAN 5 MG: 5 TABLET, FILM COATED ORAL at 20:57

## 2024-01-19 RX ADMIN — SODIUM CHLORIDE, PRESERVATIVE FREE 10 ML: 5 INJECTION INTRAVENOUS at 20:57

## 2024-01-19 RX ADMIN — ALLOPURINOL 100 MG: 100 TABLET ORAL at 08:34

## 2024-01-19 RX ADMIN — ASPIRIN 81 MG: 81 TABLET, CHEWABLE ORAL at 08:34

## 2024-01-19 NOTE — CARE COORDINATION
NATHALIE spoke with Fanny ([phone]) again with admissions at [facility]. She confirmed that she can accept this pt tomorrow. She said that the pt can come as early at 10am in the morning. NATHALIE was told that this pt will be going to room 213 at [facility] and call report number is [phone]. Pt's family/friend will pick her up tomorrow. NATHALIE placed an envelope on the chart for the AVS and MAR and instructions for pt to call report tomorrow. Lizeth Strickland,MICHAELA,ACM-SW

## 2024-01-19 NOTE — PLAN OF CARE
Problem: Physical Therapy - Adult  Goal: By Discharge: Performs mobility at highest level of function for planned discharge setting.  See evaluation for individualized goals.  Description: FUNCTIONAL STATUS PRIOR TO ADMISSION: Patient was independent and active without use of DME.    HOME SUPPORT PRIOR TO ADMISSION: The patient lived alone with no local support.    Physical Therapy Goals  Initiated 1/16/2024  1.  Patient will move from supine to sit and sit to supine and roll side to side in bed with modified independence within 7 day(s).    2.  Patient will perform sit to stand with modified independence within 7 day(s).  3.  Patient will transfer from bed to chair and chair to bed with modified independence using the least restrictive device within 7 day(s).  4.  Patient will ambulate with modified independence for 150 feet with the least restrictive device within 7 day(s).   5.  Patient will ascend/descend 4 stairs with single handrail(s) with supervision/set-up within 7 day(s).    Outcome: Progressing     PHYSICAL THERAPY TREATMENT    Patient: Frannie Blake (78 y.o. 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 She is generally supervision to SBA mobilizing. She ambulated 250 ft while carrying the life vest bag with no loss of balance though did have mild path deviation/ unsteadiness at times.            PLAN:  Patient continues to benefit from skilled intervention to address the above impairments.  Continue treatment per established

## 2024-01-19 NOTE — PROGRESS NOTES
0730: Bedside and Verbal shift change report given to SILVIA Casiano (oncoming nurse) by SILVIA Browning (offgoing nurse). Report included the following information Nurse Handoff Report, Index, Adult Overview, MAR, Recent Results, and Cardiac Rhythm NSR w/ first degree AVB .     1200: Shower order received.     1930: Bedside and Verbal shift change report given to SILVIA Baum (oncoming nurse) by SILVIA Casiano (offgoing nurse). Report included the following information Nurse Handoff Report, Index, Adult Overview, MAR, Recent Results, and Cardiac Rhythm NSR w/ first degree AVB .         Care Plan:   Problem: Safety - Adult  Goal: Free from fall injury  1/19/2024 0812 by Lisa Yoder RN  Outcome: Progressing  1/18/2024 2154 by Nallely Charles RN  Outcome: Progressing     Problem: Discharge Planning  Goal: Discharge to home or other facility with appropriate resources  1/19/2024 0812 by Lisa Yoder RN  Outcome: Progressing  1/18/2024 2154 by Nallely Charles RN  Outcome: Progressing     Problem: Chronic Conditions and Co-morbidities  Goal: Patient's chronic conditions and co-morbidity symptoms are monitored and maintained or improved  1/19/2024 0812 by Lisa Yoder RN  Outcome: Progressing  1/18/2024 2154 by Nallely Charles RN  Outcome: Progressing     Problem: Skin/Tissue Integrity  Goal: Absence of new skin breakdown  Description: 1.  Monitor for areas of redness and/or skin breakdown  2.  Assess vascular access sites hourly  3.  Every 4-6 hours minimum:  Change oxygen saturation probe site  4.  Every 4-6 hours:  If on nasal continuous positive airway pressure, respiratory therapy assess nares and determine need for appliance change or resting period.  1/19/2024 0812 by Lisa Yoder RN  Outcome: Progressing  1/18/2024 2154 by Nallely Charles RN  Outcome: Progressing

## 2024-01-19 NOTE — PROGRESS NOTES
this  examination and automatic exposure control for dose modulation. Adaptive  statistical iterative reconstruction (ASIR) was utilized.    FINDINGS: The ventricles and sulci are age-appropriate without hydrocephalus.  There is no mass effect or midline shift. There is no intracranial hemorrhage or  extra-axial fluid collection. There is no abnormal area of decreased density to  suggest infarct. There is age-indeterminate microvascular ischemic change in the  periventricular white matter. The basal cisterns are patent.    The osseous structures are intact. The visualized paranasal sinuses and mastoid  air cells are clear.    Impression  No acute intracranial abnormality. Age-indeterminate microvascular ischemic  change in the periventricular white matter.    Xray Result (most recent):  XR CHEST PORTABLE 01/14/2024    Narrative  INDICATION:  dyspnea    COMPARISON: None    FINDINGS: Single AP portable view of the chest obtained at 1144 demonstrates an  enlarged cardiac silhouette. There are small bilateral pleural effusions with  bibasilar atelectasis. No osseous abnormalities are seen.    Impression  Cardiomegaly. Small bilateral pleural effusions with bibasilar  atelectasis.        Recent Labs     01/17/24  0338 01/18/24  0343 01/19/24  0334   * 138 139   K 3.3* 4.3 4.2   CL 95* 101 104   CO2 31 28 26   BUN 14 12 15   MG 2.1 2.2 2.1   ALT 94* 82* 76       Recent Labs     01/17/24  0339 01/18/24  0343   WBC 7.0 5.9   HGB 14.5 14.5   HCT 43.8 43.2    154       Creatinine (MG/DL)   Date Value   01/19/2024 1.04 (H)   01/18/2024 1.10 (H)   01/17/2024 1.18 (H)   01/16/2024 1.58 (H)   01/15/2024 1.41 (H)       Current meds:    Current Facility-Administered Medications:     metoprolol succinate (TOPROL XL) extended release tablet 25 mg, 25 mg, Oral, Lunch, Dayana Daniels APRN - NP    aspirin chewable tablet 81 mg, 81 mg, Oral, Daily, Dayana Daniels APRN - NP, 81 mg at 01/19/24 0834    empagliflozin  (JARDIANCE) tablet 10 mg, 10 mg, Oral, Daily, Gilberto Quiñones MD, 10 mg at 01/19/24 0834    apixaban (ELIQUIS) tablet 5 mg, 5 mg, Oral, BID, Dayana Daniels, APRN - NP, 5 mg at 01/19/24 0834    sacubitril-valsartan (ENTRESTO) 24-26 MG per tablet 1 tablet, 1 tablet, Oral, BID, Abram Mcguire MD, 1 tablet at 01/19/24 0836    allopurinol (ZYLOPRIM) tablet 100 mg, 100 mg, Oral, Daily, Maryjo Lilli B, APRN - NP, 100 mg at 01/19/24 0834    spironolactone (ALDACTONE) tablet 12.5 mg, 12.5 mg, Oral, Daily, Maryjo, Lilli B, APRN - NP, 12.5 mg at 01/19/24 0836    buPROPion (WELLBUTRIN SR) extended release tablet 150 mg, 150 mg, Oral, Daily, Prakash Parker MD, 150 mg at 01/19/24 0834    folic acid (FOLVITE) tablet 1 mg, 1 mg, Oral, Daily, Prkaash Parker MD, 1 mg at 01/19/24 0834    levothyroxine (SYNTHROID) tablet 75 mcg, 75 mcg, Oral, Daily, Prakash Parker MD, 75 mcg at 01/19/24 0647    atorvastatin (LIPITOR) tablet 20 mg, 20 mg, Oral, Daily, Prakash Parker MD, 20 mg at 01/19/24 0834    sodium chloride flush 0.9 % injection 5-40 mL, 5-40 mL, IntraVENous, 2 times per day, Prakash Parker MD, 10 mL at 01/19/24 0834    sodium chloride flush 0.9 % injection 5-40 mL, 5-40 mL, IntraVENous, PRN, Prakash Parker MD    0.9 % sodium chloride infusion, , IntraVENous, PRN, Prakash Parker MD    ondansetron (ZOFRAN-ODT) disintegrating tablet 4 mg, 4 mg, Oral, Q8H PRN **OR** ondansetron (ZOFRAN) injection 4 mg, 4 mg, IntraVENous, Q6H PRN, Prakash Parker MD    polyethylene glycol (GLYCOLAX) packet 17 g, 17 g, Oral, Daily PRN, Prakash Parker MD    acetaminophen (TYLENOL) tablet 650 mg, 650 mg, Oral, Q6H PRN **OR** acetaminophen (TYLENOL) suppository 650 mg, 650 mg, Rectal, Q6H PRN, Prakash Parker MD    potassium chloride 10 mEq/100 mL IVPB (Peripheral Line), 10 mEq, IntraVENous, PRN, Prakash Parker MD    magnesium sulfate 2000 mg in 50 mL IVPB premix, 2,000 mg, IntraVENous, PRN, Prakash Parker MD    Vitamin D

## 2024-01-19 NOTE — CONSULTS
Comprehensive Nutrition Assessment    Type and Reason for Visit: Initial, Consult    Nutrition Recommendations/Plan:   Will modify diet order to Low Fat/Low Chol/2gm Na+  Will order Ensure Enlive daily    RD provided [facility] diet education on 1/15.       Malnutrition Assessment:  Malnutrition Status:  No malnutrition (01/19/24 1427)         Nutrition Assessment:    79 yo female admitted for acute on chronic CHF.  PMHx: HTN, HLD, hypothyroid.      MD consult received for general nutrition management/ONS.  Spoke with pt at bedside.  She reports eating fair.  Intakes are documented as 51-75% meals.  She states she has always been a healthy eater at home.  Has never had to use ONS before but is agreeable to try chocolate Ensure here.    RD provided [facility] diet education on 1/15 (see note from that date).    Weight hx in EMR indicates stable weight PTA.     Labs reviewed.        Nutritionally Significant Medications:  Jardiance, folic acid, Synthroid, Aldactone, Vit D      Estimated Daily Nutrient Needs:  Energy Requirements Based On: Kcal/kg  Weight Used for Energy Requirements: Current  Energy (kcal/day): 4297-5040 (25-30 kcals/kg)  Weight Used for Protein Requirements: Current  Protein (g/day): 76 (1.3 gm/kg)  Method Used for Fluid Requirements: 1 ml/kcal  Fluid (ml/day):      Nutrition Related Findings:   Edema: None                    Last BM: 01/15/24    Wounds:   Wound Type: None      Current Nutrition Therapies:  Diet: 4 Carb Choice, Low Fat/Low Chol/ALCIDES  Supplements: none  Meal Intake:   Patient Vitals for the past 168 hrs:   PO Meals Eaten (%)   01/18/24 1522 51 - 75%   01/18/24 1102 51 - 75%   01/18/24 0817 0%   01/17/24 1550 51 - 75%   01/17/24 1143 51 - 75%   01/17/24 0828 0%   01/16/24 1808 51 - 75%   01/16/24 1200 0%   01/16/24 0837 1 - 25%     Supplement Intake:  Patient Vitals for the past 168 hrs:   PO Supplement (%)   01/17/24 0828 0%     Nutrition Support: none      Anthropometric Measures:  Height: 157.5 cm

## 2024-01-19 NOTE — PROGRESS NOTES
[facility]                                                                                          Hospitalist Progress Note  Otilio Finley MD  Office Phone: [phone]        Date of Service:  2024  NAME:  Frannie Blake  :  1945  MRN:  [id number]       Admission Summary:   Frannie Blake is a 78 y.o. female PMH HTN, hyperlipidemia, hypothyroid on synthroid who was sent in by PCP Heidy Perez due to ongoing symptoms past 3-4 weeks, mild edema lower extremities which she attributed to trazodone which she is now off of with some improvement (had overt rash allergic rxn) as well as worsening fatigue with no strength to walk up stairs also worsening over past 3-4 weeks. She states after 2 steps she will need to stop and rest.  States \"I just have no stamina\" This am she states she was completely breathless after 3 steos so contacted PCP and she rec she proceed to ED for eval. She also notes a couple weeks ago she was having trouble sleeping, but denies that it was do to PND and states she just couldn't sleep.  This was the reason for the trazooone, but she states its better since stopping the trazodone.  She denies chest pain, headaches, vision changes, shortness of breath at rest, nausea, vomiting, fevers, sick contacts, every time I ask her a ROS question she replies \"I just have no stamina\". She denies h/o tobacco or etoh use and denies any h/o illicit drugs as well          Interval history / Subjective:     DC in the am      Assessment & Plan:           Heart failure with reduced ejection fraction  Nonischemic cardiomyopathy  Pulmonary hypertension due to above  Pleural effusion due to above  History of atrial flutter  Type II troponin elevation  Non obstructive CAD  Sinus bradycardia  Hypertension  TTE 1/15/2024 with a EF 15 to 20%.  TAPSE abnormal.  Severe global hypokinesis.  CTA chest with moderate bilateral pleural effusions.  TTE with  independent interpretation of the same.     Labs:     Recent Labs     01/17/24  0339 01/18/24 0343   WBC 7.0 5.9   HGB 14.5 14.5   HCT 43.8 43.2    154     Recent Labs     01/17/24 0338 01/18/24 0343 01/19/24 0334   * 138 139   K 3.3* 4.3 4.2   CL 95* 101 104   CO2 31 28 26   BUN 14 12 15   MG 2.1 2.2 2.1     Recent Labs     01/17/24 0338 01/18/24 0343 01/19/24 0334   ALT 94* 82* 76   GLOB 2.1 2.2 2.3     No results for input(s): \"INR\", \"APTT\" in the last 72 hours.    Invalid input(s): \"PTP\"   Recent Labs     01/17/24 0338   TIBC 333      No results found for: \"FOL\", \"RBCF\"   No results for input(s): \"PH\", \"PCO2\", \"PO2\" in the last 72 hours.  No results for input(s): \"CPK\" in the last 72 hours.    Invalid input(s): \"CPKMB\", \"CKNDX\", \"TROIQ\"  Lab Results   Component Value Date/Time    CHOL 118 01/15/2024 12:32 AM    HDL 26 01/15/2024 12:32 AM     No results found for: \"GLUCPOC\"  [unfilled]    Notes reviewed from all clinical/nonclinical/nursing services involved in patient's clinical care. Care coordination discussions were held with appropriate clinical/nonclinical/ nursing providers based on care coordination needs.         Patients current active Medications were reviewed, considered, added and adjusted based on the clinical condition today.      Home Medications were reconciled to the best of my ability given all available resources at the time of admission. Route is PO if not otherwise noted.      Admission Status:[id number]:::1}      Medications Reviewed:     Current Facility-Administered Medications   Medication Dose Route Frequency    metoprolol succinate (TOPROL XL) extended release tablet 25 mg  25 mg Oral Lunch    furosemide (LASIX) tablet 20 mg  20 mg Oral Daily PRN    aspirin chewable tablet 81 mg  81 mg Oral Daily    empagliflozin (JARDIANCE) tablet 10 mg  10 mg Oral Daily    apixaban (ELIQUIS) tablet 5 mg  5 mg Oral BID    sacubitril-valsartan (ENTRESTO) 24-26 MG per tablet 1 tablet  1

## 2024-01-19 NOTE — CARE COORDINATION
JENIFFER- D/c to [facility] tomorrow.Niece will transport.    Nathalie met with Dr. Finley and he said that this pt is medically ready for d/c today. CM called Fanny ([phone]) in admissions at [facility] and she said that they will not have a bed for this pt until tomorrow. CM sent a [employer] message to inform Dr. Finley. NATHALIE also met with pt to inform her. She said that her niece will be providing transportation to [facility] tomorrow via personal vehicle. CM will follow. MICHAELA Llamas,ACM-SW

## 2024-01-19 NOTE — DISCHARGE INSTRUCTIONS
Weigh on standing scale every morning before breakfast.  If weight increases by 3 lbs in 24 hours OR 5 lbs in 1 week OR you develop SOB or swelling, take 1 tablet lasix 20 mg daily  Restrict fluids to 2 liters per day.  Avoid salty foods    Notify your healthcare provider if you develops signs of Urinary tract infection or skin infection in the perineal area.

## 2024-01-19 NOTE — PROGRESS NOTES
1930: Bedside shift change report given to SILVIA Browning (oncoming nurse) by SILVIA Norton (offgoing nurse). Report included the following information Nurse Handoff Report, Adult Overview, Recent Results, and Cardiac Rhythm NSR , 1 AVB    2100: Patient found to not be wearing life vest. Patient stated that the life vest is too tight and uncomfortable. Patient education provided on the importance of wearing life vest. Life vest applied at this time.    0730: Bedside shift change report given to SILVIA Casiano (oncoming nurse) by SILVIA Browning (offgoing nurse). Report included the following information Nurse Handoff Report, Adult Overview, MAR, Recent Results, and Cardiac Rhythm NSR, 1 AVB .

## 2024-01-20 VITALS
RESPIRATION RATE: 18 BRPM | DIASTOLIC BLOOD PRESSURE: 69 MMHG | OXYGEN SATURATION: 95 % | TEMPERATURE: 97.5 F | HEART RATE: 58 BPM | BODY MASS INDEX: 23.53 KG/M2 | HEIGHT: 62 IN | WEIGHT: 127.87 LBS | SYSTOLIC BLOOD PRESSURE: 131 MMHG

## 2024-01-20 LAB
ALBUMIN SERPL-MCNC: 2.9 G/DL (ref 3.5–5)
ALBUMIN/GLOB SERPL: 1.2 (ref 1.1–2.2)
ALP SERPL-CCNC: 64 U/L (ref 45–117)
ALT SERPL-CCNC: 66 U/L (ref 12–78)
ANION GAP SERPL CALC-SCNC: 7 MMOL/L (ref 5–15)
AST SERPL-CCNC: 57 U/L (ref 15–37)
BILIRUB SERPL-MCNC: 0.8 MG/DL (ref 0.2–1)
BUN SERPL-MCNC: 16 MG/DL (ref 6–20)
BUN/CREAT SERPL: 17 (ref 12–20)
CALCIUM SERPL-MCNC: 8.7 MG/DL (ref 8.5–10.1)
CHLORIDE SERPL-SCNC: 105 MMOL/L (ref 97–108)
CO2 SERPL-SCNC: 27 MMOL/L (ref 21–32)
CREAT SERPL-MCNC: 0.95 MG/DL (ref 0.55–1.02)
GLOBULIN SER CALC-MCNC: 2.4 G/DL (ref 2–4)
GLUCOSE SERPL-MCNC: 101 MG/DL (ref 65–100)
MAGNESIUM SERPL-MCNC: 2.1 MG/DL (ref 1.6–2.4)
NT PRO BNP: 2105 PG/ML
POTASSIUM SERPL-SCNC: 4.4 MMOL/L (ref 3.5–5.1)
PROT SERPL-MCNC: 5.3 G/DL (ref 6.4–8.2)
SODIUM SERPL-SCNC: 139 MMOL/L (ref 136–145)

## 2024-01-20 PROCEDURE — 6370000000 HC RX 637 (ALT 250 FOR IP): Performed by: FAMILY MEDICINE

## 2024-01-20 PROCEDURE — 6370000000 HC RX 637 (ALT 250 FOR IP): Performed by: NURSE PRACTITIONER

## 2024-01-20 PROCEDURE — 80053 COMPREHEN METABOLIC PANEL: CPT

## 2024-01-20 PROCEDURE — 36415 COLL VENOUS BLD VENIPUNCTURE: CPT

## 2024-01-20 PROCEDURE — 83735 ASSAY OF MAGNESIUM: CPT

## 2024-01-20 PROCEDURE — 2580000003 HC RX 258: Performed by: FAMILY MEDICINE

## 2024-01-20 PROCEDURE — 6370000000 HC RX 637 (ALT 250 FOR IP): Performed by: SPECIALIST

## 2024-01-20 PROCEDURE — 83880 ASSAY OF NATRIURETIC PEPTIDE: CPT

## 2024-01-20 PROCEDURE — 6370000000 HC RX 637 (ALT 250 FOR IP): Performed by: INTERNAL MEDICINE

## 2024-01-20 RX ORDER — ASPIRIN 81 MG/1
81 TABLET, CHEWABLE ORAL DAILY
Qty: 30 TABLET | Refills: 3 | Status: ON HOLD | OUTPATIENT
Start: 2024-01-21

## 2024-01-20 RX ORDER — ALLOPURINOL 100 MG/1
100 TABLET ORAL DAILY
Qty: 30 TABLET | Refills: 3 | Status: ON HOLD | OUTPATIENT
Start: 2024-01-21

## 2024-01-20 RX ORDER — FUROSEMIDE 20 MG/1
20 TABLET ORAL DAILY PRN
Qty: 60 TABLET | Refills: 3 | Status: ON HOLD | OUTPATIENT
Start: 2024-01-20

## 2024-01-20 RX ORDER — SPIRONOLACTONE 25 MG/1
12.5 TABLET ORAL DAILY
Qty: 30 TABLET | Refills: 3 | Status: ON HOLD | OUTPATIENT
Start: 2024-01-21

## 2024-01-20 RX ADMIN — SACUBITRIL AND VALSARTAN 1 TABLET: 24; 26 TABLET, FILM COATED ORAL at 09:14

## 2024-01-20 RX ADMIN — FOLIC ACID 1 MG: 1 TABLET ORAL at 09:14

## 2024-01-20 RX ADMIN — ALLOPURINOL 100 MG: 100 TABLET ORAL at 09:14

## 2024-01-20 RX ADMIN — EMPAGLIFLOZIN 10 MG: 10 TABLET, FILM COATED ORAL at 09:15

## 2024-01-20 RX ADMIN — ATORVASTATIN CALCIUM 20 MG: 20 TABLET, FILM COATED ORAL at 09:14

## 2024-01-20 RX ADMIN — LEVOTHYROXINE SODIUM 75 MCG: 0.07 TABLET ORAL at 08:11

## 2024-01-20 RX ADMIN — BUPROPION HYDROCHLORIDE 150 MG: 150 TABLET, FILM COATED, EXTENDED RELEASE ORAL at 09:14

## 2024-01-20 RX ADMIN — ASPIRIN 81 MG: 81 TABLET, CHEWABLE ORAL at 09:13

## 2024-01-20 RX ADMIN — APIXABAN 5 MG: 5 TABLET, FILM COATED ORAL at 09:14

## 2024-01-20 RX ADMIN — SODIUM CHLORIDE, PRESERVATIVE FREE 10 ML: 5 INJECTION INTRAVENOUS at 09:14

## 2024-01-20 RX ADMIN — Medication 2000 UNITS: at 09:14

## 2024-01-20 RX ADMIN — SPIRONOLACTONE 12.5 MG: 25 TABLET ORAL at 09:13

## 2024-01-20 NOTE — PLAN OF CARE
Problem: Safety - Adult  Goal: Free from fall injury  1/20/2024 0826 by Sarika Gordon RN  Outcome: Progressing  1/20/2024 0734 by Lisa Yoder RN  Outcome: Progressing     Problem: Discharge Planning  Goal: Discharge to home or other facility with appropriate resources  1/20/2024 0826 by Sarika Gordon RN  Outcome: Progressing  1/20/2024 0734 by Lisa Yoder RN  Outcome: Progressing  Flowsheets (Taken 1/19/2024 2000 by Sarika Gordon RN)  Discharge to home or other facility with appropriate resources: Identify barriers to discharge with patient and caregiver     Problem: Chronic Conditions and Co-morbidities  Goal: Patient's chronic conditions and co-morbidity symptoms are monitored and maintained or improved  1/20/2024 0826 by Sarika Gordon RN  Outcome: Progressing  1/20/2024 0734 by Lisa Yoder RN  Outcome: Progressing  Flowsheets (Taken 1/19/2024 2000 by Sarika Gordon RN)  Care Plan - Patient's Chronic Conditions and Co-Morbidity Symptoms are Monitored and Maintained or Improved: Monitor and assess patient's chronic conditions and comorbid symptoms for stability, deterioration, or improvement     Problem: Skin/Tissue Integrity  Goal: Absence of new skin breakdown  Description: 1.  Monitor for areas of redness and/or skin breakdown  2.  Assess vascular access sites hourly  3.  Every 4-6 hours minimum:  Change oxygen saturation probe site  4.  Every 4-6 hours:  If on nasal continuous positive airway pressure, respiratory therapy assess nares and determine need for appliance change or resting period.  1/20/2024 0826 by Sarika Gordon RN  Outcome: Progressing  1/20/2024 0734 by Lisa Yoder RN  Outcome: Progressing

## 2024-01-20 NOTE — DISCHARGE SUMMARY
[place], [address], on the first floor of the [facility]. Telephone: [phone] Fax: [phone] Driving directions To [facility]. Building: Driving WEST on [address], take exit [address] to [address]. Turn left onto [address], then turn right into [facility] Parking lot Driving EAST on [address], take exit [address]. Turn right at the end of the exit ramp. Turn left onto [address], then turn right into [facility] Parking lot.              ADDITIONAL CARE RECOMMENDATIONS: Follow up with cardiologist.     DIET: cardiac diet  Oral Nutritional Supplements: Ensure once a day    ACTIVITY: activity as tolerated    WOUND CARE: NA    EQUIPMENT needed: Life vest.       DISCHARGE MEDICATIONS:     Medication List        START taking these medications      allopurinol 100 MG tablet  Commonly known as: ZYLOPRIM  Take 1 tablet by mouth daily  Start taking on: January 21, 2024     apixaban 5 MG Tabs tablet  Commonly known as: ELIQUIS  Take 1 tablet by mouth 2 times daily for 30 doses     aspirin 81 MG chewable tablet  Take 1 tablet by mouth daily  Start taking on: January 21, 2024     empagliflozin 10 MG tablet  Commonly known as: JARDIANCE  Take 1 tablet by mouth daily  Start taking on: January 21, 2024     folic acid 1 MG tablet  Commonly known as: FOLVITE     furosemide 20 MG tablet  Commonly known as: LASIX  Take 1 tablet by mouth daily as needed (Take 1 tablet daily AS NEEDED for weight gain of 3 lbs or more in 24 hours or 5 lbs in 1 week or SOB/swelling)     metoprolol succinate 25 MG extended release tablet  Commonly known as: TOPROL XL     sacubitril-valsartan 24-26 MG per tablet  Commonly known as: ENTRESTO  Take 1 tablet by mouth 2 times daily     spironolactone 25 MG tablet  Commonly known as: ALDACTONE  Take 0.5 tablets by mouth daily  Start taking on: January 21, 2024            CONTINUE taking these medications      buPROPion 150 MG  extended release tablet  Commonly known as: WELLBUTRIN XL     levothyroxine 75 MCG tablet  Commonly known as: SYNTHROID     simvastatin 20 MG tablet  Commonly known as: ZOCOR     vitamin D 125 MCG (5000 UT) Caps capsule  Commonly known as: CHOLECALCIFEROL            ASK your doctor about these medications      fluocinonide 0.05 % cream  Commonly known as: LIDEX               Where to Get Your Medications        These medications were sent to [facility]/pharmacy #[id number] - [place], [place] - [address] - P [phone] - F [phone] [address] [facility] [address] [facility] [address] [address]      Phone: [phone]   allopurinol 100 MG tablet  apixaban 5 MG Tabs tablet  aspirin 81 MG chewable tablet  empagliflozin 10 MG tablet  furosemide 20 MG tablet  sacubitril-valsartan 24-26 MG per tablet  spironolactone 25 MG tablet           NOTIFY YOUR PHYSICIAN FOR ANY OF THE FOLLOWING:   Fever over 101 degrees for 24 hours.   Chest pain, shortness of breath, fever, chills, nausea, vomiting, diarrhea, change in mentation, falling, weakness, bleeding. Severe pain or pain not relieved by medications.  Or, any other signs or symptoms that you may have questions about.    DISPOSITION:    Home With:   OT  PT  HH  RN      X Long term SNF/Inpatient Rehab    Independent/assisted living    Hospice    Other:       PATIENT CONDITION AT DISCHARGE:     Functional status    Poor     Deconditioned    X Independent      Cognition    X Lucid     Forgetful     Dementia      Catheters/lines (plus indication)    Evans     PICC     PEG    X None      Code status    X Full code     DNR      PHYSICAL EXAMINATION AT DISCHARGE:    General : alert x 3, awake, no acute distress,   HEENT: PEERL, EOMI, moist mucus membrane  Neck: supple, no JVD, no meningeal signs  Chest: Clear to auscultation bilaterally   CVS: S1 S2 heard, Capillary refill less than 2 seconds  Abd: soft/ Non tender, non distended, BS physiological,   Ext: no clubbing, no cyanosis, no edema, brisk 2+ DP

## 2024-01-20 NOTE — PROGRESS NOTES
1930: Bedside and Verbal shift change report given to Sarika RN (oncoming nurse) by Stephenie RN (offgoing nurse). Report included the following information Nurse Handoff Report, Index, Adult Overview, Intake/Output, MAR, and Cardiac Rhythm NSR .             0730: Bedside and Verbal shift change report given to Stephenie RN (oncoming nurse) by Sarika RN (offgoing nurse). Report included the following information Nurse Handoff Report, Index, Adult Overview, Intake/Output, MAR, and Cardiac Rhythm NSR/SB.

## 2024-01-20 NOTE — PROGRESS NOTES
0730: Bedside and Verbal shift change report given to SILVIA Ball (oncoming nurse) by SILVIA Baum (offgoing nurse). Report included the following information Nurse Handoff Report, Index, Adult Overview, MAR, Recent Results, and Cardiac Rhythm NSR w/ first degree AVB .     1046: Verbal report given to RN DULCE AT [facility] by SILVIA BALL .  Report included Nurse Handoff Report, Index, Adult Overview, MAR, Intake and Output, and Cardiac Rhythm NSR W/. FIRST DEGREE AVB / SB .     1100: Discharge instructions, medication education, and follow-up appointments given to patient. Pt verbalized understanding of all discharge instructions. Pt discharged with all belongings including LIFEVEST.    RN provided time for clarification on questions and/ or concerns.     Pt verbalized and acknowledged education provided, pt denies additional questions and/ or concerns at this time.    Pt discharged w/ family member and packet for [facility].             Care Plan:   Problem: Safety - Adult  Goal: Free from fall injury  Outcome: Progressing     Problem: Discharge Planning  Goal: Discharge to home or other facility with appropriate resources  Outcome: Progressing  Flowsheets (Taken 1/19/2024 2000 by Sarika Gordon, RN)  Discharge to home or other facility with appropriate resources: Identify barriers to discharge with patient and caregiver     Problem: Chronic Conditions and Co-morbidities  Goal: Patient's chronic conditions and co-morbidity symptoms are monitored and maintained or improved  Outcome: Progressing  Flowsheets (Taken 1/19/2024 2000 by Sarika Gordon, RN)  Care Plan - Patient's Chronic Conditions and Co-Morbidity Symptoms are Monitored and Maintained or Improved: Monitor and assess patient's chronic conditions and comorbid symptoms for stability, deterioration, or improvement     Problem: Skin/Tissue Integrity  Goal: Absence of new skin breakdown  Description: 1.  Monitor for areas of redness and/or skin

## 2024-01-22 ENCOUNTER — OFFICE VISIT (OUTPATIENT)
Age: 79
End: 2024-01-22

## 2024-01-22 VITALS
BODY MASS INDEX: 23.55 KG/M2 | WEIGHT: 128 LBS | OXYGEN SATURATION: 96 % | HEIGHT: 62 IN | HEART RATE: 62 BPM | TEMPERATURE: 97.9 F | SYSTOLIC BLOOD PRESSURE: 140 MMHG | DIASTOLIC BLOOD PRESSURE: 86 MMHG | RESPIRATION RATE: 18 BRPM

## 2024-01-22 DIAGNOSIS — I50.22 CHRONIC SYSTOLIC HEART FAILURE (HCC): Primary | ICD-10-CM

## 2024-01-22 LAB
ALBUMIN SERPL ELPH-MCNC: 3 G/DL (ref 2.9–4.4)
ALBUMIN/GLOB SERPL: 1.7 (ref 0.7–1.7)
ALPHA1 GLOB SERPL ELPH-MCNC: 0.2 G/DL (ref 0–0.4)
ALPHA2 GLOB SERPL ELPH-MCNC: 0.6 G/DL (ref 0.4–1)
B-GLOBULIN SERPL ELPH-MCNC: 0.6 G/DL (ref 0.7–1.3)
GAMMA GLOB SERPL ELPH-MCNC: 0.5 G/DL (ref 0.4–1.8)
GLOBULIN SER-MCNC: 1.8 G/DL (ref 2.2–3.9)
IGA SERPL-MCNC: 76 MG/DL (ref 64–422)
IGG SERPL-MCNC: 452 MG/DL (ref 586–1602)
IGM SERPL-MCNC: 71 MG/DL (ref 26–217)
INTERPRETATION SERPL IEP-IMP: ABNORMAL
KAPPA LC FREE SER-MCNC: 11.9 MG/L (ref 3.3–19.4)
KAPPA LC FREE/LAMBDA FREE SER: 1.28 (ref 0.26–1.65)
LAMBDA LC FREE SERPL-MCNC: 9.3 MG/L (ref 5.7–26.3)
M PROTEIN SERPL ELPH-MCNC: ABNORMAL G/DL
PROT SERPL-MCNC: 4.8 G/DL (ref 6–8.5)

## 2024-01-22 ASSESSMENT — PATIENT HEALTH QUESTIONNAIRE - PHQ9
1. LITTLE INTEREST OR PLEASURE IN DOING THINGS: 0
SUM OF ALL RESPONSES TO PHQ QUESTIONS 1-9: 0
SUM OF ALL RESPONSES TO PHQ9 QUESTIONS 1 & 2: 0
SUM OF ALL RESPONSES TO PHQ QUESTIONS 1-9: 0
SUM OF ALL RESPONSES TO PHQ QUESTIONS 1-9: 0
2. FEELING DOWN, DEPRESSED OR HOPELESS: 0
SUM OF ALL RESPONSES TO PHQ QUESTIONS 1-9: 0

## 2024-01-22 NOTE — PROGRESS NOTES
Called [facility] spoke with nurse Harper ([phone])  to update on entresto med dose change. And lab orders for 1.5 weeks. Patient will be seen in 2 weeks at [facility], RN will call back with appt time once scheduled.    Faxed copy of papers to Latisha at [phone]  
tablets by mouth daily, Disp: 30 tablet, Rfl: 3    allopurinol (ZYLOPRIM) 100 MG tablet, Take 1 tablet by mouth daily, Disp: 30 tablet, Rfl: 3    buPROPion (WELLBUTRIN XL) 150 MG extended release tablet, TAKE 1 TABLET BY MOUTH EVERY DAY IN THE MORNING FOR 90 DAYS, Disp: , Rfl:     vitamin D (CHOLECALCIFEROL) 125 MCG (5000 UT) CAPS capsule, 1 tablet Orally Once a day, Disp: , Rfl:     fluocinonide (LIDEX) 0.05 % cream, APPLY TO AFFECTED AREA ON FACE 2 TIMES A DAY FOR 3 DAYS THEN STOP, Disp: , Rfl:     folic acid (FOLVITE) 1 MG tablet, Take 1 tablet by mouth daily, Disp: , Rfl:     levothyroxine (SYNTHROID) 75 MCG tablet, 1 tablet in the morning on an empty stomach Orally Once a day for 30 day(s), Disp: , Rfl:     metoprolol succinate (TOPROL XL) 25 MG extended release tablet, TAKE 1 TABLET BY MOUTH EVERY DAY FOR 90 DAYS, Disp: , Rfl:     simvastatin (ZOCOR) 20 MG tablet, Take 1 tablet by mouth daily, Disp: , Rfl:     PATIENT CARE TEAM:  Patient Care Team:  Heidy Perez MD as PCP - General     Thank you for allowing me to participate in this patient's care.    HILARIO Kramer - NP   [facility]  [facility]  [address] [place] [address], [address]  Phone: [phone]    I have spent a total time of 35 minutes personally reviewing new vitals, test results, notes, telemetry/EKG, face to face encounter/physical exam of patient, writing orders, performing medical decision making, and patient education.

## 2024-01-25 NOTE — PROGRESS NOTES
LV gram, apical and basal segments move normally and may be slightly   hyperdynamic.  Possibility of midcavity variant of stress cardiomyopathy   cannot be completely excluded    1/18 Cardiac note- NICM, aflutter, elevated trop d/t CM, bradycardia on   monitor. Will need lifevest on d/c    1/18 PN: Heart failure with reduced ejection fraction, Nonischemic   cardiomyopathy, Pulmonary hypertension due to above, Non obstructive CAD    Treatment: Lasix IV, Prinivil, Toprol, Entresto, Aldactone, Cordarone IV,   telemetry monitoring, ALCIDES diet, strict I/O, daily weight, Cardiology and   Advanced HF team following.    Thank you  Flakita Cole RN  Clinical Documentation  [phone], or via Perfect Serve  Options provided:  -- CHF due to Hypertensive Heart Disease  -- CHF due to Hypertensive Heart Disease and CAD  -- CHF not due to Hypertension but due to CAD  -- CHF due to Hypertensive Heart Disease and ICMP  -- CHF not due to Hypertension but due to ICMP  -- CHF due to Hypertensive Heart Disease and Valvular Heart Disease  -- CHF not due to Hypertension but due to valvular heart disease  -- CHF due to HTN, CAD, ICMP and valvular disease  -- Other - I will add my own diagnosis  -- Disagree - Not applicable / Not valid  -- Disagree - Clinically unable to determine / Unknown  -- Refer to Clinical Documentation Reviewer    PROVIDER RESPONSE TEXT:    This patient has CHF due to hypertensive heart disease and ICMP.    Query created by: Flakita Cole on 1/23/2024 11:13 AM      Electronically signed by:  Otilio Finley MD 1/25/2024 9:14 AM

## 2024-01-28 ENCOUNTER — HOSPITAL ENCOUNTER (INPATIENT)
Facility: HOSPITAL | Age: 79
LOS: 2 days | Discharge: HOME | DRG: 273 | End: 2024-01-30
Attending: EMERGENCY MEDICINE | Admitting: INTERNAL MEDICINE
Payer: MEDICARE

## 2024-01-28 ENCOUNTER — APPOINTMENT (OUTPATIENT)
Facility: HOSPITAL | Age: 79
DRG: 273 | End: 2024-01-28
Payer: MEDICARE

## 2024-01-28 DIAGNOSIS — I95.9 HYPOTENSION, UNSPECIFIED HYPOTENSION TYPE: ICD-10-CM

## 2024-01-28 DIAGNOSIS — R79.89 ELEVATED TROPONIN: ICD-10-CM

## 2024-01-28 DIAGNOSIS — I49.9 CARDIAC ARRHYTHMIA: ICD-10-CM

## 2024-01-28 DIAGNOSIS — Z86.79 HISTORY OF ACUTE HEART FAILURE: ICD-10-CM

## 2024-01-28 DIAGNOSIS — I47.11 INAPPROPRIATE SINUS TACHYCARDIA: Primary | ICD-10-CM

## 2024-01-28 LAB
ALBUMIN SERPL-MCNC: 3.1 G/DL (ref 3.5–5)
ALBUMIN/GLOB SERPL: 1.3 (ref 1.1–2.2)
ALP SERPL-CCNC: 57 U/L (ref 45–117)
ALT SERPL-CCNC: 33 U/L (ref 12–78)
ANION GAP SERPL CALC-SCNC: 10 MMOL/L (ref 5–15)
APPEARANCE UR: CLEAR
AST SERPL-CCNC: 34 U/L (ref 15–37)
BACTERIA URNS QL MICRO: NEGATIVE /HPF
BASOPHILS # BLD: 0.1 K/UL (ref 0–0.1)
BASOPHILS NFR BLD: 1 % (ref 0–1)
BILIRUB SERPL-MCNC: 0.5 MG/DL (ref 0.2–1)
BILIRUB UR QL: NEGATIVE
BUN SERPL-MCNC: 18 MG/DL (ref 6–20)
BUN/CREAT SERPL: 15 (ref 12–20)
CALCIUM SERPL-MCNC: 8.6 MG/DL (ref 8.5–10.1)
CHLORIDE SERPL-SCNC: 111 MMOL/L (ref 97–108)
CO2 SERPL-SCNC: 20 MMOL/L (ref 21–32)
COLOR UR: ABNORMAL
CREAT SERPL-MCNC: 1.19 MG/DL (ref 0.55–1.02)
DIFFERENTIAL METHOD BLD: ABNORMAL
EKG ATRIAL RATE: 79 BPM
EKG DIAGNOSIS: NORMAL
EKG P AXIS: 39 DEGREES
EKG P-R INTERVAL: 232 MS
EKG Q-T INTERVAL: 296 MS
EKG Q-T INTERVAL: 324 MS
EKG Q-T INTERVAL: 374 MS
EKG QRS DURATION: 132 MS
EKG QRS DURATION: 146 MS
EKG QRS DURATION: 76 MS
EKG QTC CALCULATION (BAZETT): 428 MS
EKG QTC CALCULATION (BAZETT): 463 MS
EKG QTC CALCULATION (BAZETT): 474 MS
EKG R AXIS: -42 DEGREES
EKG R AXIS: -75 DEGREES
EKG R AXIS: 198 DEGREES
EKG T AXIS: 116 DEGREES
EKG T AXIS: 121 DEGREES
EKG T AXIS: 124 DEGREES
EKG VENTRICULAR RATE: 129 BPM
EKG VENTRICULAR RATE: 147 BPM
EKG VENTRICULAR RATE: 79 BPM
EOSINOPHIL # BLD: 0.4 K/UL (ref 0–0.4)
EOSINOPHIL NFR BLD: 6 % (ref 0–7)
EPITH CASTS URNS QL MICRO: ABNORMAL /LPF
ERYTHROCYTE [DISTWIDTH] IN BLOOD BY AUTOMATED COUNT: 14.5 % (ref 11.5–14.5)
GLOBULIN SER CALC-MCNC: 2.4 G/DL (ref 2–4)
GLUCOSE SERPL-MCNC: 107 MG/DL (ref 65–100)
GLUCOSE UR STRIP.AUTO-MCNC: 100 MG/DL
HCT VFR BLD AUTO: 49.6 % (ref 35–47)
HGB BLD-MCNC: 16.1 G/DL (ref 11.5–16)
HGB UR QL STRIP: NEGATIVE
HYALINE CASTS URNS QL MICRO: ABNORMAL /LPF (ref 0–5)
IMM GRANULOCYTES # BLD AUTO: 0 K/UL (ref 0–0.04)
IMM GRANULOCYTES NFR BLD AUTO: 0 % (ref 0–0.5)
INR PPP: 1.1 (ref 0.9–1.1)
KETONES UR QL STRIP.AUTO: NEGATIVE MG/DL
LACTATE SERPL-SCNC: 1.9 MMOL/L (ref 0.4–2)
LEUKOCYTE ESTERASE UR QL STRIP.AUTO: NEGATIVE
LYMPHOCYTES # BLD: 1.3 K/UL (ref 0.8–3.5)
LYMPHOCYTES NFR BLD: 18 % (ref 12–49)
MAGNESIUM SERPL-MCNC: 2.1 MG/DL (ref 1.6–2.4)
MCH RBC QN AUTO: 29.5 PG (ref 26–34)
MCHC RBC AUTO-ENTMCNC: 32.5 G/DL (ref 30–36.5)
MCV RBC AUTO: 91 FL (ref 80–99)
MONOCYTES # BLD: 0.8 K/UL (ref 0–1)
MONOCYTES NFR BLD: 11 % (ref 5–13)
NEUTS SEG # BLD: 4.5 K/UL (ref 1.8–8)
NEUTS SEG NFR BLD: 64 % (ref 32–75)
NITRITE UR QL STRIP.AUTO: NEGATIVE
NRBC # BLD: 0 K/UL (ref 0–0.01)
NRBC BLD-RTO: 0 PER 100 WBC
NT PRO BNP: 1696 PG/ML
PH UR STRIP: 6 (ref 5–8)
PLATELET # BLD AUTO: 263 K/UL (ref 150–400)
PMV BLD AUTO: 9.9 FL (ref 8.9–12.9)
POTASSIUM SERPL-SCNC: 4.3 MMOL/L (ref 3.5–5.1)
PROCALCITONIN SERPL-MCNC: <0.05 NG/ML
PROT SERPL-MCNC: 5.5 G/DL (ref 6.4–8.2)
PROT UR STRIP-MCNC: NEGATIVE MG/DL
PROTHROMBIN TIME: 11.4 SEC (ref 9–11.1)
RBC # BLD AUTO: 5.45 M/UL (ref 3.8–5.2)
RBC #/AREA URNS HPF: ABNORMAL /HPF (ref 0–5)
SODIUM SERPL-SCNC: 141 MMOL/L (ref 136–145)
SP GR UR REFRACTOMETRY: 1.01 (ref 1–1.03)
TROPONIN I SERPL HS-MCNC: 1879 NG/L (ref 0–51)
TROPONIN I SERPL HS-MCNC: 373 NG/L (ref 0–51)
TROPONIN I SERPL HS-MCNC: 5365 NG/L (ref 0–51)
URINE CULTURE IF INDICATED: ABNORMAL
UROBILINOGEN UR QL STRIP.AUTO: 0.2 EU/DL (ref 0.2–1)
WBC # BLD AUTO: 7.1 K/UL (ref 3.6–11)
WBC URNS QL MICRO: ABNORMAL /HPF (ref 0–4)

## 2024-01-28 PROCEDURE — 2000000000 HC ICU R&B

## 2024-01-28 PROCEDURE — P9047 ALBUMIN (HUMAN), 25%, 50ML: HCPCS | Performed by: EMERGENCY MEDICINE

## 2024-01-28 PROCEDURE — 80053 COMPREHEN METABOLIC PANEL: CPT

## 2024-01-28 PROCEDURE — 71045 X-RAY EXAM CHEST 1 VIEW: CPT

## 2024-01-28 PROCEDURE — 81001 URINALYSIS AUTO W/SCOPE: CPT

## 2024-01-28 PROCEDURE — 93010 ELECTROCARDIOGRAM REPORT: CPT | Performed by: INTERNAL MEDICINE

## 2024-01-28 PROCEDURE — 83735 ASSAY OF MAGNESIUM: CPT

## 2024-01-28 PROCEDURE — 85025 COMPLETE CBC W/AUTO DIFF WBC: CPT

## 2024-01-28 PROCEDURE — 85610 PROTHROMBIN TIME: CPT

## 2024-01-28 PROCEDURE — 36415 COLL VENOUS BLD VENIPUNCTURE: CPT

## 2024-01-28 PROCEDURE — 93005 ELECTROCARDIOGRAM TRACING: CPT | Performed by: EMERGENCY MEDICINE

## 2024-01-28 PROCEDURE — 2580000003 HC RX 258: Performed by: INTERNAL MEDICINE

## 2024-01-28 PROCEDURE — 84484 ASSAY OF TROPONIN QUANT: CPT

## 2024-01-28 PROCEDURE — 99223 1ST HOSP IP/OBS HIGH 75: CPT | Performed by: INTERNAL MEDICINE

## 2024-01-28 PROCEDURE — 6360000002 HC RX W HCPCS: Performed by: EMERGENCY MEDICINE

## 2024-01-28 PROCEDURE — 99285 EMERGENCY DEPT VISIT HI MDM: CPT

## 2024-01-28 PROCEDURE — 96365 THER/PROPH/DIAG IV INF INIT: CPT

## 2024-01-28 PROCEDURE — 6360000002 HC RX W HCPCS: Performed by: INTERNAL MEDICINE

## 2024-01-28 PROCEDURE — 93005 ELECTROCARDIOGRAM TRACING: CPT | Performed by: INTERNAL MEDICINE

## 2024-01-28 PROCEDURE — 84145 PROCALCITONIN (PCT): CPT

## 2024-01-28 PROCEDURE — 83605 ASSAY OF LACTIC ACID: CPT

## 2024-01-28 PROCEDURE — 6370000000 HC RX 637 (ALT 250 FOR IP): Performed by: INTERNAL MEDICINE

## 2024-01-28 PROCEDURE — 83880 ASSAY OF NATRIURETIC PEPTIDE: CPT

## 2024-01-28 RX ORDER — SODIUM CHLORIDE 0.9 % (FLUSH) 0.9 %
5-40 SYRINGE (ML) INJECTION PRN
Status: DISCONTINUED | OUTPATIENT
Start: 2024-01-28 | End: 2024-01-30 | Stop reason: HOSPADM

## 2024-01-28 RX ORDER — ALBUMIN (HUMAN) 12.5 G/50ML
25 SOLUTION INTRAVENOUS ONCE
Status: COMPLETED | OUTPATIENT
Start: 2024-01-28 | End: 2024-01-28

## 2024-01-28 RX ORDER — ONDANSETRON 4 MG/1
4 TABLET, ORALLY DISINTEGRATING ORAL EVERY 8 HOURS PRN
Status: DISCONTINUED | OUTPATIENT
Start: 2024-01-28 | End: 2024-01-30 | Stop reason: HOSPADM

## 2024-01-28 RX ORDER — POLYETHYLENE GLYCOL 3350 17 G/17G
17 POWDER, FOR SOLUTION ORAL DAILY PRN
Status: DISCONTINUED | OUTPATIENT
Start: 2024-01-28 | End: 2024-01-30 | Stop reason: HOSPADM

## 2024-01-28 RX ORDER — ADENOSINE 3 MG/ML
6 INJECTION, SOLUTION INTRAVENOUS ONCE
Status: DISCONTINUED | OUTPATIENT
Start: 2024-01-28 | End: 2024-01-28

## 2024-01-28 RX ORDER — ENOXAPARIN SODIUM 100 MG/ML
40 INJECTION SUBCUTANEOUS DAILY
Status: DISCONTINUED | OUTPATIENT
Start: 2024-01-28 | End: 2024-01-28 | Stop reason: SDUPTHER

## 2024-01-28 RX ORDER — POTASSIUM CHLORIDE 7.45 MG/ML
10 INJECTION INTRAVENOUS PRN
Status: DISCONTINUED | OUTPATIENT
Start: 2024-01-28 | End: 2024-01-30 | Stop reason: HOSPADM

## 2024-01-28 RX ORDER — POTASSIUM CHLORIDE 29.8 MG/ML
20 INJECTION INTRAVENOUS PRN
Status: DISCONTINUED | OUTPATIENT
Start: 2024-01-28 | End: 2024-01-30 | Stop reason: HOSPADM

## 2024-01-28 RX ORDER — ADENOSINE 3 MG/ML
6 INJECTION, SOLUTION INTRAVENOUS ONCE
Status: COMPLETED | OUTPATIENT
Start: 2024-01-28 | End: 2024-01-28

## 2024-01-28 RX ORDER — SODIUM CHLORIDE 0.9 % (FLUSH) 0.9 %
5-40 SYRINGE (ML) INJECTION EVERY 12 HOURS SCHEDULED
Status: DISCONTINUED | OUTPATIENT
Start: 2024-01-28 | End: 2024-01-30 | Stop reason: HOSPADM

## 2024-01-28 RX ORDER — ACETAMINOPHEN 650 MG/1
650 SUPPOSITORY RECTAL EVERY 6 HOURS PRN
Status: DISCONTINUED | OUTPATIENT
Start: 2024-01-28 | End: 2024-01-30 | Stop reason: HOSPADM

## 2024-01-28 RX ORDER — LEVOTHYROXINE SODIUM 0.07 MG/1
75 TABLET ORAL DAILY
Status: DISCONTINUED | OUTPATIENT
Start: 2024-01-29 | End: 2024-01-30 | Stop reason: HOSPADM

## 2024-01-28 RX ORDER — MAGNESIUM SULFATE IN WATER 40 MG/ML
2000 INJECTION, SOLUTION INTRAVENOUS PRN
Status: DISCONTINUED | OUTPATIENT
Start: 2024-01-28 | End: 2024-01-30 | Stop reason: HOSPADM

## 2024-01-28 RX ORDER — ACETAMINOPHEN 325 MG/1
650 TABLET ORAL EVERY 6 HOURS PRN
Status: DISCONTINUED | OUTPATIENT
Start: 2024-01-28 | End: 2024-01-30 | Stop reason: HOSPADM

## 2024-01-28 RX ORDER — METOPROLOL SUCCINATE 25 MG/1
25 TABLET, EXTENDED RELEASE ORAL DAILY
Status: DISCONTINUED | OUTPATIENT
Start: 2024-01-29 | End: 2024-01-30 | Stop reason: HOSPADM

## 2024-01-28 RX ORDER — ASPIRIN 81 MG/1
81 TABLET, CHEWABLE ORAL DAILY
Status: DISCONTINUED | OUTPATIENT
Start: 2024-01-28 | End: 2024-01-30 | Stop reason: HOSPADM

## 2024-01-28 RX ORDER — SODIUM CHLORIDE 9 MG/ML
INJECTION, SOLUTION INTRAVENOUS PRN
Status: DISCONTINUED | OUTPATIENT
Start: 2024-01-28 | End: 2024-01-30 | Stop reason: HOSPADM

## 2024-01-28 RX ORDER — BUPROPION HYDROCHLORIDE 150 MG/1
150 TABLET, EXTENDED RELEASE ORAL DAILY
Status: DISCONTINUED | OUTPATIENT
Start: 2024-01-28 | End: 2024-01-30 | Stop reason: HOSPADM

## 2024-01-28 RX ORDER — ATORVASTATIN CALCIUM 10 MG/1
10 TABLET, FILM COATED ORAL DAILY
Status: DISCONTINUED | OUTPATIENT
Start: 2024-01-28 | End: 2024-01-30 | Stop reason: HOSPADM

## 2024-01-28 RX ORDER — FOLIC ACID 1 MG/1
1 TABLET ORAL DAILY
Status: DISCONTINUED | OUTPATIENT
Start: 2024-01-28 | End: 2024-01-30 | Stop reason: HOSPADM

## 2024-01-28 RX ORDER — ONDANSETRON 2 MG/ML
4 INJECTION INTRAMUSCULAR; INTRAVENOUS EVERY 6 HOURS PRN
Status: DISCONTINUED | OUTPATIENT
Start: 2024-01-28 | End: 2024-01-30 | Stop reason: HOSPADM

## 2024-01-28 RX ADMIN — PHENYLEPHRINE HYDROCHLORIDE 30 MCG/MIN: 10 INJECTION INTRAVENOUS at 21:36

## 2024-01-28 RX ADMIN — ADENOSINE 6 MG: 3 INJECTION INTRAVENOUS at 15:17

## 2024-01-28 RX ADMIN — ASPIRIN 81 MG: 81 TABLET, CHEWABLE ORAL at 16:08

## 2024-01-28 RX ADMIN — SODIUM CHLORIDE, PRESERVATIVE FREE 10 ML: 5 INJECTION INTRAVENOUS at 21:39

## 2024-01-28 RX ADMIN — APIXABAN 5 MG: 5 TABLET, FILM COATED ORAL at 16:08

## 2024-01-28 RX ADMIN — BUPROPION HYDROCHLORIDE 150 MG: 150 TABLET, FILM COATED, EXTENDED RELEASE ORAL at 16:08

## 2024-01-28 RX ADMIN — ATORVASTATIN CALCIUM 10 MG: 10 TABLET, FILM COATED ORAL at 16:08

## 2024-01-28 RX ADMIN — ALBUMIN (HUMAN) 25 G: 0.25 INJECTION, SOLUTION INTRAVENOUS at 11:06

## 2024-01-28 RX ADMIN — FOLIC ACID 1 MG: 1 TABLET ORAL at 16:08

## 2024-01-28 ASSESSMENT — PAIN - FUNCTIONAL ASSESSMENT: PAIN_FUNCTIONAL_ASSESSMENT: NONE - DENIES PAIN

## 2024-01-28 NOTE — CONSULTS
[facility] CARDIOLOGY  Cardiac Electrophysiology Note     [x]Initial Encounter     []Follow-up    Patient Name: Frannie Blake - :1945 - MRN:[id number]  Primary Cardiologist: Advanced Heart Failure/Dr. Mcguire  Consulting Cardiologist: Rosalina Mendes MD, [facility], [facility]       Reason for encounter:   Narrow complex tachycardia      HPI:  78-year-old woman with a history of newly diagnosed systolic congestive heart failure, LVEF of 15 to 20%, New York Heart Association class II-III symptoms presenting with palpitations and recurrent tachycardia/hypotension.  She was recently hospitalized on 24 with newly diagnosed congestive heart failure.  Right heart cath and left heart cath was done on 2024 which demonstrated normal filling pressures and reduced cardiac index of 1.6, moderate single-vessel CAD with mild variant of stress cardiomyopathy.  She was given a LifeVest and was discharged.  Plan was to repeat echocardiogram in 3 months, outpatient cardiac MRI.  Previously on amiodarone was discontinued due to bradycardia.  Patient reported feeling dizzy lightheaded and not well today.  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  Recommended ER physician to administer adenosine but terminated.  Patient's blood pressure systolic in 80s.  Cardiology was consulted for further management.    SUBJECTIVE:  Currently feels significantly better status post conversion.  No shortness of breath, chest pain palpitations.  Denies dizziness.     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  Recent diagnosis of atrial flutter.  Could have had a separate atrial flutter diagnosis given          ____________________________________________________________    Cardiac testing  01/14/24    ECHO (TTE) COMPLETE (PRN CONTRAST/BUBBLE/STRAIN/3D) 01/15/2024 10:04 AM (Final)    Interpretation Summary    Left Ventricle: Severely reduced left ventricular systolic function with a visually estimated EF of 15 - 20%. Left ventricle size is normal. Normal wall thickness. Severe global hypokinesis present. Abnormal diastolic function.    Right Ventricle: Low normal systolic function. TAPSE is normal. TAPSE is 1.8 cm.    Mitral Valve: Mild regurgitation.    Tricuspid Valve: Mildly elevated RVSP. The estimated RVSP is 43 mmHg.    Extracardiac: Medium sized left pleural effusion.    Image quality is good.    Signed by: Abram Mcguire MD on 1/15/2024 10:04 AM    01/14/24    CARDIAC PROCEDURE 01/16/2024  4:23 PM (Final)    Conclusion  Findings  1.  Reduced cardiac output and index.  Cardiac -1.6 L/min/m²  2.  Moderate one-vessel coronary artery disease.  This involves proximal to mid LAD with 60% stenosis  3.  Severe cardiomyopathy with overall EF of about 25% and LV dilatation.  On LV gram, apical and basal segments move normally and may be slightly hyperdynamic.  Possibility of midcavity variant of stress cardiomyopathy cannot be completely excluded especially in light of accompanying changes including ST elevations in certain leads as well as extreme QTc prolongation.  4.  Normal right and left-sided filling pressures    Access: Right radial, right internal jugular vein ultrasound-guided no issues  Contrast 60 cc    Recommendations-1.  Continue GDMT for heart failure with reduced ejection fraction.    Signed by: Will Ray MD on 1/16/2024  4:23 PM    No results found for this or any previous visit.          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

## 2024-01-28 NOTE — PROGRESS NOTES
Consulted for admission but recent charted VS show MAPs still in 50s, HR 70s, and recent TTE showing EF 15-20%. Requested Intensivist consult and Cardiology recommendations. Will await both.    Thank you,  Chaya Gonzales MD

## 2024-01-28 NOTE — ED PROVIDER NOTES
[facility] EMERGENCY DEP  EMERGENCY DEPARTMENT ENCOUNTER      Pt Name: Frannie Blake  MRN: [id number]  Birthdate 1945  Date of evaluation: 1/28/2024  Provider: Bennett Fields MD    CHIEF COMPLAINT     No chief complaint on file.        HISTORY OF PRESENT ILLNESS   (Location/Symptom, Timing/Onset, Context/Setting, Quality, Duration, Modifying Factors, Severity)  Note limiting factors.   Frannie Blake is a 79 y.o. female who presents to the emergency department      The history is provided by the patient and the EMS personnel. No  was used.   Fatigue  Severity:  Moderate  Onset quality:  Sudden  Timing:  Constant  Progression:  Improving  Chronicity:  New  Context: change in medication    Associated symptoms: dizziness    Associated symptoms: no abdominal pain, no chest pain, no cough, no diarrhea, no dysuria, no fever, no headaches, no loss of consciousness, no nausea, no seizures, no shortness of breath, no syncope, no urgency and no vomiting    Risk factors: congestive heart failure        Nursing Notes were reviewed.    REVIEW OF SYSTEMS    (2-9 systems for level 4, 10 or more for level 5)     Review of Systems   Constitutional:  Positive for fatigue. Negative for activity change, chills and fever.   HENT:  Negative for nosebleeds.    Eyes:  Negative for visual disturbance.   Respiratory:  Negative for cough and shortness of breath.    Cardiovascular:  Negative for chest pain, palpitations and syncope.   Gastrointestinal:  Negative for abdominal pain, constipation, diarrhea, nausea and vomiting.   Genitourinary:  Negative for difficulty urinating, dysuria, hematuria and urgency.   Musculoskeletal:  Negative for back pain, neck pain and neck stiffness.   Skin:  Negative for color change.   Allergic/Immunologic: Negative for immunocompromised state.   Neurological:  Positive for dizziness. Negative for seizures, loss of consciousness, syncope, weakness, light-headedness, numbness and  with complaints of fatigue, malaise, hypotension, tachycardia.  Patient states she woke this morning with fatigue and malaise after going to bed last night in her normal state of health.  Per report, she is in rehab after being admitted to the hospital with acute congestive heart failure.  Per report, patient was hypotensive and tachycardic in the field.  Patient is unclear as to which medication she is receiving, as they are not being administered for her.  She denies chest pain or shortness of breath, pedal edema.  She states symptoms have improved since receiving IV hydration (approximately 400 cc normal saline) from EMS prior to arrival.  Blood pressures 90s over 60s on arrival, and tachycardic in the 140s.  Chart review indicates she has a history of a flutter, RVR, and 15 to 20% EF.  On arrival she is awake and alert in no acute distress, denying other complaints.  Her breath sounds are clear, abdomen is not tender, rapid regular heart rate, EKG suspicious for sinus tachycardia, no pedal edema or JVD.  Discussed with patient will continue gentle hydration, obtain CMP, CBC, EKG, cardiac enzymes, BMP, mag, coags, UA.  We will reassess, and make a disposition, however the patient may require admission for further care and evaluation.        REASSESSMENT     ED Course as of 01/28/24 0929   Sun Jan 28, 2024   0732 EKG interpretation: (Preliminary)  Rhythm: sinus tachycardia; and regular . Rate (approx.): 147; Axis: left axis deviation; P wave: normal; QRS interval: prolonged; ST/T wave: non-specific changes; Other findings: abnormal ekg   [FD]      ED Course User Index  [FD] Bennett Fields MD     Perfect Serve Consult for Admission  9:29 AM    ED Room Number: ER05/05  Patient Name and age:  rFannie Blake 79 y.o.  female  Working Diagnosis:   1. Inappropriate sinus tachycardia    2. Hypotension, unspecified hypotension type    3. History of acute heart failure    4. Elevated troponin        COVID-19

## 2024-01-28 NOTE — ED TRIAGE NOTES
Patient arrived to ED via [facility] from [facility] with chief complaint of malaise and hypotension. Patient states that she was here last Sunday to be seen by cardiology where she was given a life-vest monitor to wear for a week

## 2024-01-28 NOTE — CONSULTS
Consult    Date of Service:  1/28/2024  Primary Care Provider: Heidy Perez MD  Source of information: The patient, Chart review, and Spouse/family member    Chief Complaint: Hypotension and Tachycardia      History of Presenting Illness:   Frannie Blake is a 79 y.o. female with recently diagnosed NICM/HFrEF 15-20%, pulmonary HTN, pleural effusion, atrial flutter, nonobstructive CAD, HTN, HLD, CKD Stage 3, and hypothyroidism who was BIBEMS from [facility] with lightheadedness, fatigue, dizziness, and hypotension. Pt noted extreme fatigue trying to go up the stairs this morning. Pt was recently hospitalized at [facility] 1/14/24-1/20/2024 with the previous diagnoses and discharged to SNF with a LifeVest. In the ED, pt was noted to have sinus tachycardia and hypotension. CXR showed no acute process. BNP and troponin were elevated. Cardiology was consulted and diagnosed possible AVNRT. HM was consulted for admission but BP have been low in the 80s/50s. Tachycardia resolved s/p adenosine 6mg IV x1.     REVIEW OF SYSTEMS:  Pertinent items are noted in the History of Present Illness.     Past Medical History:   Diagnosis Date    CHF (congestive heart failure) (HCC)     Hypertension     Menopause     LMP-late 40s?    Thyroid disease       Past Surgical History:   Procedure Laterality Date    CARDIAC PROCEDURE N/A 1/16/2024    Left and right heart cath / coronary angiography performed by Will Ray MD at [facility] CARDIAC CATH LAB     Prior to Admission medications    Medication Sig Start Date End Date Taking? Authorizing Provider   sacubitril-valsartan (ENTRESTO) 49-51 MG per tablet Take 1 tablet by mouth 2 times daily 1/22/24   Dai Rome, APRN - NP   aspirin 81 MG chewable tablet Take 1 tablet by mouth daily 1/21/24   Clive Jauregui MD   apixaban (ELIQUIS) 5 MG TABS tablet Take 1 tablet by mouth 2 times daily for 30 doses 1/20/24 2/4/24  Clive Jauregui MD   empagliflozin (JARDIANCE)  following life and organ supporting interventions that required my frequent assessment to treat or prevent imminent deterioration.  I personally spent 30 minutes of critical care time.  This is time spent at this critically ill patient's bedside actively involved in patient care as well as the coordination of care and discussions with the patient's family.  This does not include any procedural time which has been billed separately.      Signed By: Chaya Gonzales MD     January 28, 2024         Please note that this dictation may have been completed with Dragon, the NetDragon voice recognition software.  Quite often unanticipated grammatical, syntax, homophones, and other interpretive errors are inadvertently transcribed by the computer software.  Please disregard these errors.  Please excuse any errors that have escaped final proofreading.

## 2024-01-28 NOTE — H&P
CRITICAL CARE NOTE    Name: Frannie Blake   : 1945   MRN: [id number]   Date: 2024      REASON FOR ICU ADMISSION:  Hypotension    BRIEF PATIENT SUMMARY   79 F with PMH of Htn, HLD, hypothyroidism, nonobstructive CAD and recently diagnosed NICM (LVEF 15-20% by TTE 01/15/24) thought to be tachycardia induced. She was discharged  to a rehab facility. On the morning of this admission () she awoke feeling generally weak and was found to be hypotensive. Therefore, she was transported to [facility] ED where she was confirmed to be hypotensive but without overt signs of shock - renal function at baseline, mentation intact, lactate normal. Her BP failed to improve substantially with IVFs and a bolus of albumin. Therefore, ICU admission was requested. For a brief spell in the ED she was in a PSVT with rate 147/min. She has a prior history of fib/flutter. While in the ED, her troponin walter from 373 > 1879. She denies chest pain. Cardiology has evaluated. EKG reveals STTW abnormalities that are unchanged from previously    HOSPITAL COURSE/DAILY EVENT LOG       COMPREHENSIVE ASSESSMENT & PLAN:SYSTEM BASED     NEUROLOGICAL:  Generalized weakness   Monitor      PULMONOLOGY:  No acute issues   Continuous pulse oximetry  Supplemental O2 as needed    CARDIOVASCULAR:  Severe NICM - LVEF 15-20%  Elevated troponin with EKG abnormalities  Hypotension - likely cardiogenic. No overt evidence of sepsis   Cardiac/hemodynamic monitoring  MAP goal > 60 mmHg, SBP goal > 80 mmHg  Cardiology following - possible ablation   Cont Jardiance, apixaban, statin  Holding metoprolol, Entresto, furosemide, spironolactone    GASTROINTESTINAL  No acute issues   Reg diet    RENAL/ELECTROLYTE/FLUIDS:  No acute issues   Monitor I/Os and Uo  Monitor renal function panel intermittently  Correct electrolytes as needed  Avoid nephrotoxins    ENDOCRINE:  Hypothyroidism   Cont levothyroxine 75 mcg daily  TSH in AM

## 2024-01-29 PROBLEM — Z79.01 ANTICOAGULATION ADEQUATE: Status: ACTIVE | Noted: 2024-01-29

## 2024-01-29 PROBLEM — I50.23 ACUTE ON CHRONIC SYSTOLIC CONGESTIVE HEART FAILURE (HCC): Status: ACTIVE | Noted: 2024-01-14

## 2024-01-29 PROBLEM — I42.0 DILATED CARDIOMYOPATHY (HCC): Status: ACTIVE | Noted: 2024-01-29

## 2024-01-29 PROBLEM — I47.10 SVT (SUPRAVENTRICULAR TACHYCARDIA): Status: ACTIVE | Noted: 2024-01-29

## 2024-01-29 PROBLEM — I48.92 ATRIAL FLUTTER (HCC): Status: ACTIVE | Noted: 2024-01-29

## 2024-01-29 LAB
ALBUMIN SERPL-MCNC: 3.2 G/DL (ref 3.5–5)
ALBUMIN/GLOB SERPL: 1.4 (ref 1.1–2.2)
ALP SERPL-CCNC: 50 U/L (ref 45–117)
ALT SERPL-CCNC: 30 U/L (ref 12–78)
ANION GAP SERPL CALC-SCNC: 7 MMOL/L (ref 5–15)
AST SERPL-CCNC: 64 U/L (ref 15–37)
BASOPHILS # BLD: 0.1 K/UL (ref 0–0.1)
BASOPHILS NFR BLD: 2 % (ref 0–1)
BILIRUB SERPL-MCNC: 0.8 MG/DL (ref 0.2–1)
BUN SERPL-MCNC: 11 MG/DL (ref 6–20)
BUN/CREAT SERPL: 10 (ref 12–20)
CALCIUM SERPL-MCNC: 8.5 MG/DL (ref 8.5–10.1)
CHLORIDE SERPL-SCNC: 111 MMOL/L (ref 97–108)
CO2 SERPL-SCNC: 23 MMOL/L (ref 21–32)
CREAT SERPL-MCNC: 1.05 MG/DL (ref 0.55–1.02)
DIFFERENTIAL METHOD BLD: ABNORMAL
ECHO BSA: 1.59 M2
EKG ATRIAL RATE: 75 BPM
EKG DIAGNOSIS: NORMAL
EKG P AXIS: 16 DEGREES
EKG P-R INTERVAL: 232 MS
EKG Q-T INTERVAL: 392 MS
EKG QRS DURATION: 76 MS
EKG QTC CALCULATION (BAZETT): 437 MS
EKG R AXIS: -66 DEGREES
EKG T AXIS: 117 DEGREES
EKG VENTRICULAR RATE: 75 BPM
EOSINOPHIL # BLD: 0.4 K/UL (ref 0–0.4)
EOSINOPHIL NFR BLD: 7 % (ref 0–7)
ERYTHROCYTE [DISTWIDTH] IN BLOOD BY AUTOMATED COUNT: 14.4 % (ref 11.5–14.5)
GLOBULIN SER CALC-MCNC: 2.3 G/DL (ref 2–4)
GLUCOSE SERPL-MCNC: 79 MG/DL (ref 65–100)
HCT VFR BLD AUTO: 48.2 % (ref 35–47)
HGB BLD-MCNC: 15.8 G/DL (ref 11.5–16)
IMM GRANULOCYTES # BLD AUTO: 0 K/UL (ref 0–0.04)
IMM GRANULOCYTES NFR BLD AUTO: 0 % (ref 0–0.5)
LACTATE SERPL-SCNC: 1.1 MMOL/L (ref 0.4–2)
LYMPHOCYTES # BLD: 1.7 K/UL (ref 0.8–3.5)
LYMPHOCYTES NFR BLD: 27 % (ref 12–49)
MCH RBC QN AUTO: 29.9 PG (ref 26–34)
MCHC RBC AUTO-ENTMCNC: 32.8 G/DL (ref 30–36.5)
MCV RBC AUTO: 91.3 FL (ref 80–99)
MONOCYTES # BLD: 0.8 K/UL (ref 0–1)
MONOCYTES NFR BLD: 12 % (ref 5–13)
NEUTS SEG # BLD: 3.3 K/UL (ref 1.8–8)
NEUTS SEG NFR BLD: 52 % (ref 32–75)
NRBC # BLD: 0 K/UL (ref 0–0.01)
NRBC BLD-RTO: 0 PER 100 WBC
PLATELET # BLD AUTO: 269 K/UL (ref 150–400)
PMV BLD AUTO: 10.1 FL (ref 8.9–12.9)
POTASSIUM SERPL-SCNC: 4.1 MMOL/L (ref 3.5–5.1)
PROCALCITONIN SERPL-MCNC: <0.05 NG/ML
PROT SERPL-MCNC: 5.5 G/DL (ref 6.4–8.2)
RBC # BLD AUTO: 5.28 M/UL (ref 3.8–5.2)
SODIUM SERPL-SCNC: 141 MMOL/L (ref 136–145)
TROPONIN I SERPL HS-MCNC: 3487 NG/L (ref 0–51)
TSH SERPL DL<=0.05 MIU/L-ACNC: 13 UIU/ML (ref 0.36–3.74)
WBC # BLD AUTO: 6.3 K/UL (ref 3.6–11)

## 2024-01-29 PROCEDURE — 51798 US URINE CAPACITY MEASURE: CPT

## 2024-01-29 PROCEDURE — 83605 ASSAY OF LACTIC ACID: CPT

## 2024-01-29 PROCEDURE — 80053 COMPREHEN METABOLIC PANEL: CPT

## 2024-01-29 PROCEDURE — 6370000000 HC RX 637 (ALT 250 FOR IP): Performed by: INTERNAL MEDICINE

## 2024-01-29 PROCEDURE — C1894 INTRO/SHEATH, NON-LASER: HCPCS | Performed by: INTERNAL MEDICINE

## 2024-01-29 PROCEDURE — 51701 INSERT BLADDER CATHETER: CPT

## 2024-01-29 PROCEDURE — C1730 CATH, EP, 19 OR FEW ELECT: HCPCS | Performed by: INTERNAL MEDICINE

## 2024-01-29 PROCEDURE — 2580000003 HC RX 258: Performed by: INTERNAL MEDICINE

## 2024-01-29 PROCEDURE — 85025 COMPLETE CBC W/AUTO DIFF WBC: CPT

## 2024-01-29 PROCEDURE — 99153 MOD SED SAME PHYS/QHP EA: CPT | Performed by: INTERNAL MEDICINE

## 2024-01-29 PROCEDURE — 84484 ASSAY OF TROPONIN QUANT: CPT

## 2024-01-29 PROCEDURE — 93653 COMPRE EP EVAL TX SVT: CPT | Performed by: INTERNAL MEDICINE

## 2024-01-29 PROCEDURE — 93613 INTRACARDIAC EPHYS 3D MAPG: CPT | Performed by: INTERNAL MEDICINE

## 2024-01-29 PROCEDURE — 2709999900 HC NON-CHARGEABLE SUPPLY: Performed by: INTERNAL MEDICINE

## 2024-01-29 PROCEDURE — 93623 PRGRMD STIMJ&PACG IV RX NFS: CPT | Performed by: INTERNAL MEDICINE

## 2024-01-29 PROCEDURE — C1732 CATH, EP, DIAG/ABL, 3D/VECT: HCPCS | Performed by: INTERNAL MEDICINE

## 2024-01-29 PROCEDURE — 2000000000 HC ICU R&B

## 2024-01-29 PROCEDURE — 84145 PROCALCITONIN (PCT): CPT

## 2024-01-29 PROCEDURE — C1760 CLOSURE DEV, VASC: HCPCS | Performed by: INTERNAL MEDICINE

## 2024-01-29 PROCEDURE — 84443 ASSAY THYROID STIM HORMONE: CPT

## 2024-01-29 PROCEDURE — 99152 MOD SED SAME PHYS/QHP 5/>YRS: CPT | Performed by: INTERNAL MEDICINE

## 2024-01-29 PROCEDURE — 2720000010 HC SURG SUPPLY STERILE: Performed by: INTERNAL MEDICINE

## 2024-01-29 PROCEDURE — 6360000002 HC RX W HCPCS: Performed by: INTERNAL MEDICINE

## 2024-01-29 PROCEDURE — 2500000003 HC RX 250 WO HCPCS: Performed by: INTERNAL MEDICINE

## 2024-01-29 PROCEDURE — 02583ZZ DESTRUCTION OF CONDUCTION MECHANISM, PERCUTANEOUS APPROACH: ICD-10-PCS | Performed by: INTERNAL MEDICINE

## 2024-01-29 PROCEDURE — 76937 US GUIDE VASCULAR ACCESS: CPT | Performed by: INTERNAL MEDICINE

## 2024-01-29 PROCEDURE — 36415 COLL VENOUS BLD VENIPUNCTURE: CPT

## 2024-01-29 PROCEDURE — 99233 SBSQ HOSP IP/OBS HIGH 50: CPT | Performed by: INTERNAL MEDICINE

## 2024-01-29 RX ORDER — SODIUM CHLORIDE 9 MG/ML
INJECTION, SOLUTION INTRAVENOUS CONTINUOUS PRN
Status: COMPLETED | OUTPATIENT
Start: 2024-01-29 | End: 2024-01-29

## 2024-01-29 RX ORDER — NOREPINEPHRINE BITARTRATE 0.06 MG/ML
.5-2 INJECTION, SOLUTION INTRAVENOUS CONTINUOUS
Status: DISPENSED | OUTPATIENT
Start: 2024-01-29 | End: 2024-01-30

## 2024-01-29 RX ORDER — FENTANYL CITRATE 50 UG/ML
INJECTION, SOLUTION INTRAMUSCULAR; INTRAVENOUS PRN
Status: DISCONTINUED | OUTPATIENT
Start: 2024-01-29 | End: 2024-01-29 | Stop reason: HOSPADM

## 2024-01-29 RX ORDER — MIDAZOLAM HYDROCHLORIDE 1 MG/ML
INJECTION INTRAMUSCULAR; INTRAVENOUS PRN
Status: DISCONTINUED | OUTPATIENT
Start: 2024-01-29 | End: 2024-01-29 | Stop reason: HOSPADM

## 2024-01-29 RX ADMIN — SODIUM CHLORIDE 4 MCG/MIN: 9 INJECTION, SOLUTION INTRAVENOUS at 08:48

## 2024-01-29 RX ADMIN — LEVOTHYROXINE SODIUM 75 MCG: 0.07 TABLET ORAL at 06:55

## 2024-01-29 RX ADMIN — ATORVASTATIN CALCIUM 10 MG: 10 TABLET, FILM COATED ORAL at 08:51

## 2024-01-29 RX ADMIN — BUPROPION HYDROCHLORIDE 150 MG: 150 TABLET, FILM COATED, EXTENDED RELEASE ORAL at 08:51

## 2024-01-29 RX ADMIN — EMPAGLIFLOZIN 10 MG: 10 TABLET, FILM COATED ORAL at 08:50

## 2024-01-29 RX ADMIN — ASPIRIN 81 MG: 81 TABLET, CHEWABLE ORAL at 08:51

## 2024-01-29 RX ADMIN — FOLIC ACID 1 MG: 1 TABLET ORAL at 08:51

## 2024-01-29 NOTE — PROGRESS NOTES
4 Eyes Skin Assessment     NAME:  Frannie Blake  YOB: 1945  MEDICAL RECORD NUMBER:  [id number]    The patient is being assessed for  Admission    I agree that at least one RN has performed a thorough Head to Toe Skin Assessment on the patient. ALL assessment sites listed below have been assessed.      Areas assessed by both nurses:    Head, Face, Ears, Shoulders, Back, Chest, Arms, Elbows, Hands, Sacrum. Buttock, Coccyx, Ischium, Legs. Feet and Heels, and Under Medical Devices         Does the Patient have a Wound? No noted wound(s)       Misael Prevention initiated by RN: Yes  Wound Care Orders initiated by RN: No    Pressure Injury (Stage 3,4, Unstageable, DTI, NWPT, and Complex wounds) if present, place Wound referral order by RN under : No    New Ostomies, if present place, Ostomy referral order under : No     Nurse 1 eSignature: Electronically signed by FIDENCIO WHITE RN on 1/28/24 at 8:07 PM EST    **SHARE this note so that the co-signing nurse can place an eSignature**    Nurse 2 eSignature: Electronically signed by Karlene Durbin RN on 1/28/24 at 8:54 PM EST

## 2024-01-29 NOTE — PROGRESS NOTES
EP LAB to Recovery Room Report    Procedure: SVT Ablation    MD: Cinthya    Pre-procedure heart rhythm: Tachycardia 150's  Verbal Report given to Nurse on patient being transferred back to room in CVICU. Patient stable upon transfer.  Pt had conscious sedation.  Vitals, mental status, MAR, procedural summary discussed with recovery RN.     Conscious sedation medication given by EP RN:  Versed 0.5 mg  Fentanyl 50mcg    Post-procedure heart rhythm: NSR 80's    Sheaths:  1618hrs: Right femoral vein 6fr sheath removed, closed with Perclose.  1614hrs: Right femoral vein 8fr sheath removed, closed with Perclose.  1623hrs: Left femoral vein 6fr sheath removed, closed with Perclose.  1621hrs: Left femoral vein 8fr sheath removed, closed with Perclose.      1624hrs:  Report called to [facility].  Per Dr. Mendes, they can begin to wean patient off Norephinephrine gtt once returned to CVICU.

## 2024-01-29 NOTE — PROGRESS NOTES
Cardiac Cath Lab Procedure Area Arrival Note:    Frannie Blake arrived to Cardiac Cath Lab, Procedure Area. Patient identifiers verified with NAME and DATE OF BIRTH. Procedure verified with patient. Consent forms verified. Allergies verified. Patient informed of procedure and plan of care. Questions answered with review. Patient voiced understanding of procedure and plan of care.    Patient on cardiac monitor, non-invasive blood pressure, SPO2 monitor. On room air; placed on O2 @ 3 lpm via nasal cannula.  IV of normal saline on pump at 50 ml/hr. Patient status doing well with some problems : Rapid rhythm; norepi drip. Patient is A&Ox 4. Patient reports no complaints of pain.     Patient medicated during procedure with orders obtained and verified by Dr. Mendes.    Refer to patients Cardiac Cath Lab PROCEDURE REPORT for vital signs, assessment, status, and response during procedure, printed at end of case. Printed report on chart or scanned into chart.      Norepinephrine drip rate verified with MAR at 6mcg/min.

## 2024-01-29 NOTE — PROGRESS NOTES
Bedside and Verbal shift change report given to Karlene BURT (oncoming nurse) by Kell (offgoing nurse). Report included the following information Nurse Handoff Report, Adult Overview, Recent Results, and Cardiac Rhythm STachy .     2115 BP 76/57 MAP 63. Paged Dr. Tamayo to discuss possible pressor support.     2120 Dr. Tamayo on unit, discussed Pt condition and VS. Order received for Luis gtt.     Bedside and Verbal shift change report given to Shabnam (oncoming nurse) by Karlene BURT (offgoing nurse). Report included the following information Nurse Handoff Report, Adult Overview, Recent Results, and Cardiac Rhythm ST/SVT .

## 2024-01-29 NOTE — PROGRESS NOTES
4 Eyes Skin Assessment     NAME:  Frannie Blake  YOB: 1945  MEDICAL RECORD NUMBER:  [id number]    The patient is being assessed for  Cath Lab Post-Op    I agree that at least one RN has performed a thorough Head to Toe Skin Assessment on the patient. ALL assessment sites listed below have been assessed.      Areas assessed by both nurses:    Head, Face, Ears, Shoulders, Back, Chest, Arms, Elbows, Hands, Sacrum. Buttock, Coccyx, Ischium, Legs. Feet and Heels, and Under Medical Devices         Does the Patient have a Wound? No noted wound(s)       Misael Prevention initiated by RN: Yes  Wound Care Orders initiated by RN: No    Pressure Injury (Stage 3,4, Unstageable, DTI, NWPT, and Complex wounds) if present, place Wound referral order by RN under : No    New Ostomies, if present place, Ostomy referral order under : No     Nurse 1 eSignature: Electronically signed by Veronica Wesley RN on 1/29/24 at 5:04 PM EST    **SHARE this note so that the co-signing nurse can place an eSignature**    Nurse 2 eSignature: {Esignature:[id number]}  '

## 2024-01-29 NOTE — PROGRESS NOTES
[facility] Daily Progress Note.      Name: Frannie Blake   : 1945   MRN: [id number]   Date: 2024        Chief Complaint   Patient presents with    Hypotension    Tachycardia         HPI:   78-year-old woman with a history of newly diagnosed systolic congestive heart failure, LVEF of 15 to 20%, New York Heart Association class II-III symptoms presenting with palpitations and recurrent tachycardia/hypotension.       She was recently hospitalized on 24 with newly diagnosed congestive heart failure. Right heart cath and left heart cath was done on 2024 which demonstrated normal filling pressures and reduced cardiac index of 1.6, moderate single-vessel CAD with mild variant of stress cardiomyopathy. She was given a LifeVest and was discharged. Plan was to repeat echocardiogram in 3 months, outpatient cardiac MRI. Previously on amiodarone was discontinued due to bradycardia     Pt seen again after ablation and HR in 70's with improvedBP. Plan is to wean off norepi overnight    Active Problems Being Managed:     SVT    Assessment/Plan:       NEUROLOGICAL:  Generalized weakness   Monitor                             PULMONOLOGY:  No acute issues   Continuous pulse oximetry  Supplemental O2 as needed     CARDIOVASCULAR:  Severe NICM - LVEF 15-20%  Elevated troponin with EKG abnormalities  Hypotension - likely cardiogenic. No overt evidence of sepsis   Cardiac/hemodynamic monitoring  MAP goal > 60 mmHg, SBP goal > 80 mmHg  Cardiology following - plan for ablation today  Cont Jardiance, apixaban, statin  Holding metoprolol, Entresto, furosemide, spironolactone     GASTROINTESTINAL  No acute issues   NPO for ablation today     RENAL/ELECTROLYTE/FLUIDS:  No acute issues   Monitor I/Os and UOP  Correct electrolytes as needed  Avoid nephrotoxins     ENDOCRINE:  Hypothyroidism   Cont levothyroxine 75 mcg daily  TSH- 8.5 on 24     HEMATOLOGY/ONCOLOGY:  No acute issues   H/H stable  tablet Take 1 tablet by mouth 2 times daily for 30 doses 1/20/24 2/4/24  Clive Jauregui MD   empagliflozin (JARDIANCE) 10 MG tablet Take 1 tablet by mouth daily 1/21/24   Clive Jauregui MD   furosemide (LASIX) 20 MG tablet Take 1 tablet by mouth daily as needed (Take 1 tablet daily AS NEEDED for weight gain of 3 lbs or more in 24 hours or 5 lbs in 1 week or SOB/swelling) 1/20/24   Clive Jauregui MD   spironolactone (ALDACTONE) 25 MG tablet Take 0.5 tablets by mouth daily 1/21/24   Clive Jauregui MD   allopurinol (ZYLOPRIM) 100 MG tablet Take 1 tablet by mouth daily 1/21/24   Clive Jauregui MD   buPROPion (WELLBUTRIN XL) 150 MG extended release tablet TAKE 1 TABLET BY MOUTH EVERY DAY IN THE MORNING FOR 90 DAYS 7/20/23   Adina Zendejas MD   vitamin D (CHOLECALCIFEROL) 125 MCG (5000 UT) CAPS capsule 1 tablet Orally Once a day    Adina Zendejas MD   fluocinonide (LIDEX) 0.05 % cream APPLY TO AFFECTED AREA ON FACE 2 TIMES A DAY FOR 3 DAYS THEN STOP 6/23/23   Adina Zendejas MD   folic acid (FOLVITE) 1 MG tablet Take 1 tablet by mouth daily 9/14/19   Adina Zendejas MD   levothyroxine (SYNTHROID) 75 MCG tablet 1 tablet in the morning on an empty stomach Orally Once a day for 30 day(s) 5/2/23   Adina Zendejas MD   metoprolol succinate (TOPROL XL) 25 MG extended release tablet TAKE 1 TABLET BY MOUTH EVERY DAY FOR 90 DAYS 7/20/23   Adina Zendejas MD   simvastatin (ZOCOR) 20 MG tablet Take 1 tablet by mouth daily 6/19/23   Adina Zendejas MD         Allergies/Social/Family History:     Allergies   Allergen Reactions    Trazodone And Nefazodone Rash      Social History     Tobacco Use    Smoking status: Never    Smokeless tobacco: Never   Substance Use Topics    Alcohol use: Never      Family History   Problem Relation Age of Onset    Heart Failure Father          LABS AND  DATA:   Reviewed      Peak airway pressure:      Minute ventilation:        CRITICAL CARE

## 2024-01-29 NOTE — PROCEDURES
ATRIOVENTRICULAR DELFINO REENTRANT TACHYCARDIA ABLATION      Procedure Date: 01/29/24  Lab Physician: Rosalina Mendes MD    Indications:  78-year-old woman with a history of newly diagnosed systolic congestive heart failure, LVEF of 15 to 20%, New York Heart Association class II-III symptoms presenting with palpitations and recurrent SVT with hypotension. Now referred for EPS/SVT ablation.    PROCEDURE NARRATIVE:  After informed consent was obtained, the patient was brought to the electrophysiology laboratory in the fasting state, and prepped and draped in the usual sterile manner. Local anesthetic was delivered to both groins, and sheaths and catheters were placed in the heart via the femoral veins under fluoroscopic guidance, as described below. Conscious sedation was administered by the nursing staff independent of those performing the procedure under my supervision with intermittent dosing of anxiolytics and narcotics for 6 mins.    SHEATH INSERTION AND ULTRASOUND-GUIDED ACCESS  Both groins were infiltrated with Lidocaine (1%) for local anesthesia. All sheaths were placed using the modified Seldinger technique with ultrasound guided assistance (ultrasound evaluation of possible access sites. Patency of the selected vessel.  Realtime visualization of the vascular needle entry was performed). Any long sheaths used were advanced to the heart over a guidewire using fluoroscopic guidance. A 6F and 8F sheaths were inserted into the left femoral vein (LVF). An 8F and 6F sheaths were inserted into the right femoral vein (RFV).    CATHETER INSERTION  Catheters were advanced to the following positions using fluoroscopic guidance:  RA: 6Fr Daig Quadripolar Kit catheter  RV: 6Fr Daig Quadripolar Kit catheter  His Bundle: 6Fr CRD2 Quadripolar Catheter  Coronary Sinus: Biosense steerable DecaNav catheter  Ablation: 4mm Biosense non-irrigated Brian ablation catheter    ELECTROPHYSIOLOGIC STUDY (EPS)  The baseline ECG  Pt presents to  urgency, dysuria and pressure when urinating starting yesterday.  Pt denies fever and flank pain. Urine specimen given in lab.    Visit Vitals  /83   Pulse 93   Temp 99 °F (37.2 °C) (Temporal)   Wt 66.3 kg (146 lb 1.6 oz)   SpO2 98%   BMI 27.61 kg/m²     [facility]

## 2024-01-29 NOTE — DISCHARGE INSTRUCTIONS
SVT Ablation   Discharge Instructions      You have just had an SVT Ablation. There were catheters temporarily placed in your heart through a puncture in the veins in your groin.        WHAT TO EXPECT     Mild to moderate, non-painful, bruising or mild swelling at the puncture site is not un-common, and will resolve in 7 - 14 days, and may extend down your thigh as it heals. Application of Ice to the site may help with any tenderness.    You have a small gauze dressing applied to the puncture site in your groin.  You may remove this the following morning.     It is not uncommon to feel palpitations during the healing phase after your ablation you're your palpitations last longer than 30 minutes, or you have recurrent tachycardia for a prolonged time, please contact the office.      MEDICATIONS     Take only the medications prescribed to you at discharge.      ACTIVITY     A responsible adult must take you home.  Do not drive a car for 24 hours.    Rest quietly for the remainder of the day.  Do not lift anything greater than 10 pounds for 7 days.  Limit bending at the puncture site and use of stairs for at least 2 days.  You may remove the bandage and shower the morning after the procedure.  Do not take a bath for 3 days.        SYMPTOMS THAT NEED TO BE REPORTED IMMEDIATELY     Bleeding at the puncture site.  If there is bleeding, lie down and hold firm direct pressure for at least 5 minutes.  If the bleeding does not stop, go to the closest emergency room, or call 911.    Temperature more than 100.5 F.  Redness or warmth at the puncture site.  Increasing pain, numbness, coolness or blue discoloration of the extremity where the puncture is located.  Pulsating mass at the puncture site.  A new “lump” at the puncture site, or increasing swelling at the site. Please be aware that a “pea” or “marble” sized lump is normal, anything larger or increasing in size should be reported.  Bruising at the puncture site that  enlarges or becomes painful (some bruising at the site is common and will go away in 7-14 days).  Dizziness, lightheadedness, fainting.    REMEMBER: If you feel something is an emergency or cannot be handled over the phone, call 911 or go to the closest emergency room.      FOLLOW UP CARE     Dr. Mendes in 1 month on 2/29/2024 at 2:40 pm.     Future Appointments   Date Time Provider Department Center   2/2/2024 10:20 AM Yannick Li MD [facility]   2/6/2024 10:00 AM Dai Rome, APRN - NP [facility] BS [facility]   2/29/2024  9:20 AM Abram Mcguire MD [facility]   2/29/2024  2:40 PM Rosalina Mendes MD [facility]   3[facility]26[facility]2024 12:30 [facility]   8/12/2024 10:40 AM Selma Zuniga DO NEUS[facility]             Yannick Mendes MD  Cardiac Electrophysiology / Cardiology    [facility] [place]  [facility]  [address] [address], [address]   [address] [place] [address], [address]  [address] [address] [phone] / [phone] Fax  [phone] / [phone] Fax

## 2024-01-29 NOTE — PROGRESS NOTES
[facility] CARDIOLOGY  Cardiac Electrophysiology Note     []Initial Encounter     [x]Follow-up    Patient Name: Frannie Blake - :1945 - MRN:[id number]  Primary Cardiologist: Advanced Heart Failure/Dr. Mcguire  Consulting Cardiologist: Rosalina Mendes MD, [facility], [facility]       Reason for encounter:   Narrow complex tachycardia      HPI:  78-year-old woman with a history of newly diagnosed systolic congestive heart failure, LVEF of 15 to 20%, New York Heart Association class II-III symptoms presenting with palpitations and recurrent tachycardia/hypotension.  She was recently hospitalized on 24 with newly diagnosed congestive heart failure.  Right heart cath and left heart cath was done on 2024 which demonstrated normal filling pressures and reduced cardiac index of 1.6, moderate single-vessel CAD with mild variant of stress cardiomyopathy.  She was given a LifeVest and was discharged.  Plan was to repeat echocardiogram in 3 months, outpatient cardiac MRI.  Previously on amiodarone was discontinued due to bradycardia.  Patient reported feeling dizzy lightheaded and not well today.  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  Recommended ER physician to administer adenosine but terminated.  Patient's blood pressure systolic in 80s.  Cardiology was consulted for further management.    SUBJECTIVE:  Recurrent Svt and tachycardia ovenight with HR in the 140s persistent. No CP or SOB.     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  Recent diagnosis of atrial flutter.  Could have had a separate atrial flutter diagnosis given underlying cardiomyopathy.  Upon further review

## 2024-01-29 NOTE — PROGRESS NOTES
1430 TRANSFER - IN REPORT:    Verbal report received from Micheal KWONG Hayden  being received from ED for routine progression of patient care      Report consisted of patient's Situation, Background, Assessment and   Recommendations(SBAR).     Information from the following report(s) Adult Overview, Surgery Report, Intake/Output, MAR, Recent Results, Cardiac Rhythm NSR, and Neuro Assessment was reviewed with the receiving nurse.    Opportunity for questions and clarification was provided.      Assessment completed upon patient's arrival to unit and care assumed.      1600: Pt arrived on the floor per stretcher accompanied by RN  Pt stood and got in the bed. Assisted to Toilet one person assist.    1700: Family here also pt's CNA here to visit.     1730: Nurse has spoken to daughter in [place] several times by phone. Delroy Scott is here and in contact with pt's daughters regarding ablation tomorrow. Haylie Welch will be coming from [place] in the morning and hopefully arrive by 10:30 am. Haylie given all information regarding how to contact her mother here in CVICU.  She wants to be here before her mother goes to cath lab. She can be available to given phone consent if needed. Pt is tired and forgetful at times. She understands that she needs the procedure but is anxious.      1813:  Pt is now in  heart rate. Occasionally bursts of what appears to be AFIB with RVR. Intensivest aware.    1900 BP still steady with MAPS above 60. SVT noted on the monitor.    Delroy Scott Tsimpris took pt's wallet home.     !930: Pt told not to get out of bed, but immediately was found climbing out to come ask the nurse a question.  Pt told by off going and oncoming nurse that she is a falls risk with so many wires. Her HR is too fast and she may pass out.     Bedside and Verbal shift change report given to Karlene VEGA  (oncoming nurse) by GEORGETTE WHITE RN (offgoing nurse). Report included the following information

## 2024-01-29 NOTE — NURSE NAVIGATOR
HEART FAILURE NURSE NAVIGATOR NOTE  [facility]    Patient chart was reviewed by Heart Failure (HF) Nurse Navigators for compliance of prescribed treatment with guidelines directed medical therapy (GDMT) and HF database completed.     Please, review beneath recommendations for symptomatic patients with HF with Reduced Ejection Fraction ? 40% (HFrEF)* and patients whose LVEF improved > 40% (HFimpEF)* for your consideration when taking care of this patient.    Current Medical Therapy:    Name Frannie LARSEN 1945   LVEF 15/20%   NYHA Functional Class III   ARNi/ACEi/ARB Entresto (currently held for procedure)   Beta-blocker Toprol XL (currently held for procedure)   Aldosterone Antagonist Aldactone   SGLT2 inhibitor Jardiance   Hydralazine/Isosorbide Dinitrate    Consulting Cardiologist: Dr. Mendes ([facility])     Recommendations:    Please, add the following GDMT for HFrEF ? 40% [Class 1] or document in discharge summary/progress note why patient cannot take the medication:  ARNi/ACEi or ARB  Beta-blockers (carvedilol, sustained-release metoprolol succinate or bisoprolol)  Aldosterone antagonists GFR > 30 and K< 5  SGLT2 inhibitor  Hydralazine/Isosorbide dinitrate for  Americans with Class III/IV symptoms  Adjust diuretic dose at discharge if hospitalized for volume overload    Consider adding the following GDMT for HFrEF ? 40%, if appropriate [Class 2b]:  Ivabradine for patients on maximally tolerated beta-blocker dose in order to achieve HR 70-80bpm  Digoxin, goal level 0.5-0.9  Polyunsaturated fatty acids  Vericuguat  For patient with hyperkalemia while on RAASi > 5.5, consider adding potassium binders (patiromer, sodium zirconium cycosilicate)    Note: the following medications may be potentially harmful in heart failure [Class 3]:  Calcium channel blockers (doxazosin, diltiazem, verapamil, nifedipine)  Antiarrhythmics (flecanide, disopyrimide, sotalol,

## 2024-01-29 NOTE — PROGRESS NOTES
0745: Bedside and Verbal shift change report given to Shabnam RN (oncoming nurse) by Karlene BURT RN (offgoing nurse). Report included the following information Nurse Handoff Report.   Pt remains NPO for ablation to be scheduled today.    0808: Dr Mendes at bedside. Pt consented for procedure today.    1405: Pt off unit to CCL. Family updated.    1730: Pt unable to void, bladder scan > 400 - straight cath 400mL.    2000: Bedside and Verbal shift change report given to Lilli VEGA (oncoming nurse) by Shabnam VEGA (offgoing nurse). Report included the following information Nurse Handoff Report.

## 2024-01-30 VITALS
RESPIRATION RATE: 20 BRPM | HEART RATE: 81 BPM | SYSTOLIC BLOOD PRESSURE: 123 MMHG | DIASTOLIC BLOOD PRESSURE: 89 MMHG | TEMPERATURE: 98 F | BODY MASS INDEX: 23.37 KG/M2 | OXYGEN SATURATION: 95 % | WEIGHT: 126.98 LBS | HEIGHT: 62 IN

## 2024-01-30 PROBLEM — R79.89 ELEVATED TROPONIN: Status: RESOLVED | Noted: 2024-01-14 | Resolved: 2024-01-30

## 2024-01-30 PROBLEM — I48.92 ATRIAL FLUTTER (HCC): Status: RESOLVED | Noted: 2024-01-29 | Resolved: 2024-01-30

## 2024-01-30 PROBLEM — I47.10 SVT (SUPRAVENTRICULAR TACHYCARDIA): Status: RESOLVED | Noted: 2024-01-29 | Resolved: 2024-01-30

## 2024-01-30 PROBLEM — I95.9 HYPOTENSION: Status: RESOLVED | Noted: 2024-01-28 | Resolved: 2024-01-30

## 2024-01-30 LAB
ALBUMIN SERPL-MCNC: 2.4 G/DL (ref 3.5–5)
ALBUMIN/GLOB SERPL: 1.1 (ref 1.1–2.2)
ALP SERPL-CCNC: 39 U/L (ref 45–117)
ALT SERPL-CCNC: 22 U/L (ref 12–78)
ANION GAP SERPL CALC-SCNC: 7 MMOL/L (ref 5–15)
AST SERPL-CCNC: 43 U/L (ref 15–37)
BILIRUB SERPL-MCNC: 0.7 MG/DL (ref 0.2–1)
BUN SERPL-MCNC: 14 MG/DL (ref 6–20)
BUN/CREAT SERPL: 18 (ref 12–20)
CALCIUM SERPL-MCNC: 7.6 MG/DL (ref 8.5–10.1)
CHLORIDE SERPL-SCNC: 115 MMOL/L (ref 97–108)
CO2 SERPL-SCNC: 21 MMOL/L (ref 21–32)
CREAT SERPL-MCNC: 0.8 MG/DL (ref 0.55–1.02)
ERYTHROCYTE [DISTWIDTH] IN BLOOD BY AUTOMATED COUNT: 14.3 % (ref 11.5–14.5)
GLOBULIN SER CALC-MCNC: 2.2 G/DL (ref 2–4)
GLUCOSE SERPL-MCNC: 59 MG/DL (ref 65–100)
HCT VFR BLD AUTO: 39.1 % (ref 35–47)
HGB BLD-MCNC: 12.9 G/DL (ref 11.5–16)
MCH RBC QN AUTO: 29.9 PG (ref 26–34)
MCHC RBC AUTO-ENTMCNC: 33 G/DL (ref 30–36.5)
MCV RBC AUTO: 90.5 FL (ref 80–99)
NRBC # BLD: 0 K/UL (ref 0–0.01)
NRBC BLD-RTO: 0 PER 100 WBC
PLATELET # BLD AUTO: 158 K/UL (ref 150–400)
PMV BLD AUTO: 10.7 FL (ref 8.9–12.9)
POTASSIUM SERPL-SCNC: 3.7 MMOL/L (ref 3.5–5.1)
PROT SERPL-MCNC: 4.6 G/DL (ref 6.4–8.2)
RBC # BLD AUTO: 4.32 M/UL (ref 3.8–5.2)
SODIUM SERPL-SCNC: 143 MMOL/L (ref 136–145)
WBC # BLD AUTO: 3.7 K/UL (ref 3.6–11)

## 2024-01-30 PROCEDURE — 2580000003 HC RX 258: Performed by: INTERNAL MEDICINE

## 2024-01-30 PROCEDURE — 85027 COMPLETE CBC AUTOMATED: CPT

## 2024-01-30 PROCEDURE — 6370000000 HC RX 637 (ALT 250 FOR IP): Performed by: INTERNAL MEDICINE

## 2024-01-30 PROCEDURE — 97535 SELF CARE MNGMENT TRAINING: CPT

## 2024-01-30 PROCEDURE — 97162 PT EVAL MOD COMPLEX 30 MIN: CPT

## 2024-01-30 PROCEDURE — 36415 COLL VENOUS BLD VENIPUNCTURE: CPT

## 2024-01-30 PROCEDURE — 80053 COMPREHEN METABOLIC PANEL: CPT

## 2024-01-30 PROCEDURE — 97116 GAIT TRAINING THERAPY: CPT

## 2024-01-30 PROCEDURE — 99233 SBSQ HOSP IP/OBS HIGH 50: CPT | Performed by: INTERNAL MEDICINE

## 2024-01-30 PROCEDURE — 97165 OT EVAL LOW COMPLEX 30 MIN: CPT

## 2024-01-30 RX ORDER — ATORVASTATIN CALCIUM 10 MG/1
10 TABLET, FILM COATED ORAL DAILY
Qty: 30 TABLET | Refills: 3 | Status: SHIPPED | OUTPATIENT
Start: 2024-01-31

## 2024-01-30 RX ADMIN — SODIUM CHLORIDE, PRESERVATIVE FREE 10 ML: 5 INJECTION INTRAVENOUS at 08:16

## 2024-01-30 RX ADMIN — ATORVASTATIN CALCIUM 10 MG: 10 TABLET, FILM COATED ORAL at 08:16

## 2024-01-30 RX ADMIN — ASPIRIN 81 MG: 81 TABLET, CHEWABLE ORAL at 08:16

## 2024-01-30 RX ADMIN — EMPAGLIFLOZIN 10 MG: 10 TABLET, FILM COATED ORAL at 08:16

## 2024-01-30 RX ADMIN — BUPROPION HYDROCHLORIDE 150 MG: 150 TABLET, FILM COATED, EXTENDED RELEASE ORAL at 08:16

## 2024-01-30 RX ADMIN — LEVOTHYROXINE SODIUM 75 MCG: 0.07 TABLET ORAL at 05:35

## 2024-01-30 RX ADMIN — FOLIC ACID 1 MG: 1 TABLET ORAL at 08:16

## 2024-01-30 RX ADMIN — METOPROLOL SUCCINATE 25 MG: 25 TABLET, EXTENDED RELEASE ORAL at 08:16

## 2024-01-30 RX ADMIN — APIXABAN 5 MG: 5 TABLET, FILM COATED ORAL at 10:28

## 2024-01-30 NOTE — PROGRESS NOTES
0745: Bedside and Verbal shift change report given to Shabnam VEGA (oncoming nurse) by Lilli VEGA (offgoing nurse). Report included the following information Nurse Handoff Report.     0745: Dr Mcknight at bedside updating pt on procedure and ability to discharge.    0900: Dr Sanabria at bedside updating pt on ability to discharge. Case management notified - orders for PT and OT evaluation in place.    1015: OT at bedside for eval.    1045: Cardiology NP at bedside updating pt's dtr on procedure yesterday.    1330: PT at bedside for eval.    1600: Pt and pt's dtr updated by Dr Sanabria and NATHALIE Hunter regarding plan for discharge and PT/OT evaluation results.    1730: Pt discharged home with pt's dtrHaylie. All personal belongings taken home with pt. Pt and pt's dtr received education regarding medications, procedural sites and care, future appointments, heart failure, diet, LifeVest, and home safety. Pt signed AVS. Pt in possession of copy of AVS and [employer] information card. PIV x 2 removed. Pt in possession of all LifeVest equipment, LifeVest in place and operating. All questions answered by RN. Dr Sanabria spoke with pt at time of discharge, no questions asked by pt or pt's dtr.

## 2024-01-30 NOTE — CARE COORDINATION
Care Management Initial Assessment       RUR: 18% - moderate  Readmission? Yes   1st IM letter given? No  1st  letter given: No    CM spoke with patient and daughter at bedside re disposition plan. Updated by therapy that patient is clear for discharge home - ambulated the unit x 2 unassisted with therapy. Daughter conferenced in patient's niece via phone for discussion re disposition. Family is concerned about patient discharging home alone with Lifevest and concerned about her cardiac situation and stability. CM explained Medicare criteria for SNF services is 3 skilled needs for admission under CMS guidelines. Patient cleared by rehab for facility placement - patient is safe from a functional standpoint to discharge home with home health. Family concerned about nursing needs related to medication management and having somebody with her 24/7. Family is wiling to pay for personal care aides - patient had personal care aide at [facility]. Concerns related to cognition and ability to care for herself. Family feels the decision to discharge home is rushed and wants clinical team to provide update related to cardiac stability.  Family is agreeable for referrals to be sent to [facility] (Bridge to Home Program) and [facility] (review for skilled services benefit). CM will need provider to approve SNF placement request.       01/30/24 7085   Service Assessment   Patient Orientation Alert and Oriented   Cognition Alert   History Provided By Patient;Child/Family   Primary Caregiver Self   Accompanied By/Relationship daughter   Support Systems Family Members;Children   Patient's Healthcare Decision Maker is: Legal Next of Kin   PCP Verified by CM Yes   Last Visit to PCP Within last 3 months   Prior Functional Level Independent in ADLs/IADLs   Current Functional Level Independent in ADLs/IADLs   Can patient return to prior living arrangement No   Ability to make needs known: Good   Family

## 2024-01-30 NOTE — CARE COORDINATION
01/30/24 1422   Readmission Assessment   Number of Days since last admission? 8-30 days   Previous Disposition SNF   Who is being Interviewed Patient  (daughter)   Did you visit your Primary Care Physician after you left the hospital, before you returned this time? No   Why weren't you able to visit your PCP? Other (Comment)  (SNF provider rounded on patient)   Did you see a specialist, such as Cardiac, Pulmonary, Orthopedic Physician, etc. after you left the hospital? Other (Comment)  (SNF)   Who advised the patient to return to the hospital? Caregiver;Skilled Unit   Does the patient report anything that got in the way of taking their medications? No   In our efforts to provide the best possible care to you and others like you, can you think of anything that we could have done to help you after you left the hospital the first time, so that you might not have needed to return so soon? Other (Comment)  (none - patient went into SVT at facility)     Dale Guillen, MPH  Care Manager l [facility]  Available via [employer] or

## 2024-01-30 NOTE — PROGRESS NOTES
RVSP is 43 mmHg.   ·  Extracardiac: Medium sized left pleural effusion.   ·  Image quality is good.     Signed by: Abram Mcguire MD on 1/15/2024 10:04 AM     01/14/24     CARDIAC PROCEDURE 01/16/2024  4:23 PM (Final)     Conclusion   Findings   1. Reduced cardiac output and index.  Cardiac -1.6 L/min/m²   2.  Moderate one-vessel coronary artery disease.  This involves proximal to mid LAD with 60% stenosis   3.  Severe cardiomyopathy with overall EF of about 25% and LV dilatation.  On LV gram, apical and basal segments move normally and may be slightly hyperdynamic.  Possibility of midcavity variant of stress cardiomyopathy cannot be completely excluded especially in light of accompanying changes including ST elevations in certain leads as well as extreme QTc prolongation.   4.  Normal right and left-sided filling pressures     Access: Right radial, right internal jugular vein ultrasound-guided no issues   Contrast 60 cc     Recommendations-1.  Continue GDMT for heart failure with reduced ejection fraction.     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   · Thyroid disease     Past Surgical History:   Procedure Laterality Date   · CARDIAC PROCEDURE N/A 1/16/2024   Left and right heart cath / coronary angiography performed by Will Ray MD at [facility] CARDIAC CATH LAB   · EP DEVICE PROCEDURE N/A 1/29/2024   Ablation SVT performed by Rosalina Mendes MD at [facility] CARDIAC CATH LAB     Social Hx:  reports that she has never smoked. She      FINDINGS: The ventricles and sulci are age-appropriate without hydrocephalus.   There is no mass effect or midline shift. There is no intracranial hemorrhage or   extra-axial fluid collection. There is no abnormal area of decreased density to   suggest infarct. There is age-indeterminate microvascular ischemic change in the   periventricular white matter. The basal cisterns are patent.     The osseous structures are intact. The visualized paranasal sinuses and mastoid   air cells are clear.     Impression   No acute intracranial abnormality. Age-indeterminate microvascular ischemic   change in the periventricular white matter.       143   K 4.1 3.7   * 115*   CO2 23 21   BUN 11 14     Recent Labs     01/29/24   0521 01/30/24   0501   WBC 6.3 3.7   HGB 15.8 12.9   HCT 48.2* 39.1    158     Recent Labs     01/28/24   0743   INR 1.1     Current Facility-Administered Medications   Medication Dose Route Frequency    sodium chloride flush 0.9 % injection 5-40 mL  5-40 mL IntraVENous 2 times per day    sodium chloride flush 0.9 % injection 5-40 mL  5-40 mL IntraVENous PRN    0.9 % sodium chloride infusion   IntraVENous PRN    potassium chloride 20 mEq/50 mL IVPB (Central Line)  20 mEq IntraVENous PRN    Or    potassium chloride 10 mEq/100 mL IVPB (Peripheral Line)  10 mEq IntraVENous PRN    magnesium sulfate 2000 mg in 50 mL IVPB premix  2,000 mg IntraVENous PRN    ondansetron (ZOFRAN-ODT) disintegrating tablet 4 mg  4 mg Oral Q8H PRN    Or    ondansetron (ZOFRAN) injection 4 mg  4 mg IntraVENous Q6H PRN    polyethylene glycol (GLYCOLAX) packet 17 g  17 g Oral Daily PRN    acetaminophen (TYLENOL) tablet 650 mg  650 mg Oral Q6H PRN    Or    acetaminophen (TYLENOL) suppository 650 mg  650 mg Rectal Q6H PRN    apixaban (ELIQUIS) tablet 5 mg  5 mg Oral BID    aspirin chewable tablet 81 mg  81 mg Oral Daily    buPROPion (WELLBUTRIN SR) extended release tablet 150 mg  150 mg Oral Daily    empagliflozin

## 2024-01-30 NOTE — DISCHARGE SUMMARY
Discharge Summary     Patient: Frannie Blake       MRN: [id number]       YOB: 1945       Age: 79 y.o.     Date of admission:  1/28/2024    Date of discharge:  1/30/2024    Primary care provider:  Heidy Perez MD     Admitting provider:  Trell Hewitt MD    Discharging provider(s): Attila Sanabria MD - Staff Intensivist - [facility] [facility]     Consultations  IP CONSULT TO CARDIOLOGY  IP CONSULT TO INTENSIVIST    Procedures  ANRT Ablation    Discharge Condition: Stable.  Discharge Destination: Home.  The patient is stable for discharge.    Admission diagnosis  Hypotension [I95.9]  Elevated troponin [R79.89]  Inappropriate sinus tachycardia [I47.11]  History of acute heart failure [Z86.79]  Hypotension, unspecified hypotension type [I95.9]    Please refer to the admission history and physical for details on the presenting problem.     Final discharge diagnoses and brief hospital course    78-year-old woman with a history of newly diagnosed systolic congestive heart failure, LVEF of 15 to 20%, New York Heart Association class II-III symptoms presenting with palpitations and recurrent tachycardia/hypotension. She had ANRT Ablation by Electrophysiology on 1/29/24. Patient seen by PT/OT on 1/30 and cleared from their perspective to go home.    (insert follow up care intructions)   - Patient to follow up with cardiology.   - D/c'd pt's Simvistatin and continued atorvistatin  - D/c'd pt's Entresto until seen by cardiology. BP has been acceptable on metoprolol and concerned for hypotension.  - Will also hold aldactone until pt seen in follow up with cardiology    Physical examination at discharge  Vitals:    01/30/24 1200   BP: 121/82   Pulse: 70   Resp: 25   Temp: 97.6 °F (36.4 °C)   SpO2:           Recent Labs     01/28/24  0743 01/29/24  0521 01/30/24  0501   WBC 7.1 6.3 3.7   HGB 16.1* 15.8 12.9   HCT 49.6* 48.2* 39.1    269 158     Recent Labs     01/28/24  0743 01/29/24  0521

## 2024-01-30 NOTE — PROGRESS NOTES
OCCUPATIONAL THERAPY EVALUATION/DISCHARGE  Patient: Frannie Blake (79 y.o. female)  Date: 1/30/2024  Primary Diagnosis: Hypotension [I95.9]  Elevated troponin [R79.89]  Inappropriate sinus tachycardia [I47.11]  History of acute heart failure [Z86.79]  Hypotension, unspecified hypotension type [I95.9]  Procedure(s) (LRB):  Ablation SVT (N/A) 1 Day Post-Op     Precautions:                    ASSESSMENT :   Patient received OOB in chair, amenable to session. Patient able to complete all ADL/functional mobility with overall independence. Patient and family with good insight into needing more assist for IADL and safety, plans to hire a private caregiver. Primary concern of patient and family is LifeVest, discussed [facility] training and support and relayed concerns to CM. Discussed general home safety techniques, patient verbalized understanding. At this time, no acute OT needs identified, will discontinue OT orders at this time. Please reconsult if change in functional status.     Functional Outcome Measure:  The patient scored 24/24 on the [facility] functional outcome measure.     Further skilled acute occupational therapy is not indicated at this time.     PLAN :  Recommend with staff: OOB 3x daily for meals, functional mobility to bathroom with staff assist    Recommendation for discharge: (in order for the patient to meet his/her long term goals): No skilled occupational therapy    Other factors to consider for discharge: impaired cognition    IF patient discharges home will need the following DME: none     SUBJECTIVE:   Patient stated, “I just can't wait to not wear the LifeVest anymore.”    OBJECTIVE DATA SUMMARY:     Past Medical History:   Diagnosis Date    CHF (congestive heart failure) (HCC)     Hypertension     Menopause     LMP-late 40s?    Thyroid disease      Past Surgical History:   Procedure Laterality Date    CARDIAC PROCEDURE N/A 1/16/2024    Left and right heart cath /

## 2024-01-30 NOTE — PROGRESS NOTES
[facility] Daily Progress Note.      Name: Frannie Blake   : 1945   MRN: [id number]   Date: 2024        Chief Complaint   Patient presents with    Hypotension    Tachycardia         HPI:   78-year-old woman with a history of newly diagnosed systolic congestive heart failure, LVEF of 15 to 20%, New York Heart Association class II-III symptoms presenting with palpitations and recurrent tachycardia/hypotension.       She was recently hospitalized on 24 with newly diagnosed congestive heart failure. Right heart cath and left heart cath was done on 2024 which demonstrated normal filling pressures and reduced cardiac index of 1.6, moderate single-vessel CAD with mild variant of stress cardiomyopathy. She was given a LifeVest and was discharged. Plan was to repeat echocardiogram in 3 months, outpatient cardiac MRI. Previously on amiodarone was discontinued due to bradycardia     POD 1 from ablation. Clinically doing well    Active Problems Being Managed:     SVT    Assessment/Plan:       NEUROLOGICAL:  Generalized weakness   - awake/alert  - sitting in chair comfortably                          PULMONOLOGY:  No acute issues   - Continuous pulse oximetry  - Supplemental O2 as needed     CARDIOVASCULAR:  Severe NICM - LVEF 15-20%  Elevated troponin with EKG abnormalities  Hypotension - likely cardiogenic. No overt evidence of sepsis   - Cardiac/hemodynamic monitoring  - MAP goal > 60 mmHg, SBP goal > 80 mmHg  - metoprolol restarted     GASTROINTESTINAL  No acute issues   - regular diet     RENAL/ELECTROLYTE/FLUIDS:  No acute issues   - creatinine improving  - Lytes acceptable     ENDOCRINE:  Hypothyroidism   Cont levothyroxine 75 mcg daily  TSH- 8.5 on 24     HEMATOLOGY/ONCOLOGY:  No acute issues   H/H stable 15/48  DVT ppx: apixaban      INFECTIOUS DISEASE:   Afebrile; wbc wnl       ICU DAILY CHECKLIST      Code Status:Full  DVT Prophylaxis:Apixaban  T/L/D: PIV  SUP: N/I   Diet:

## 2024-01-30 NOTE — PROGRESS NOTES
Physician Progress Note      PATIENT:               NITA ALONZO  CSN #:                  [id number]  :                       1945  ADMIT DATE:       2024 7:09 AM  DISCH DATE:  RESPONDING  PROVIDER #:        Attila Sanabria MD          QUERY TEXT:    Patient admitted with SVT, noted to have atrial fibrillation and is maintained   on Eliquis. If possible, please document in progress notes and discharge   summary if you are evaluating and/or treating any of the following:    The medical record reflects the following:  Risk Factors: 78 yo female with hx of HTN, CAD, CHF  Clinical Indicators: admitted with SVT and A/C CHF, noted to have atrial   flutter and A-Fib and is maintained on home med of Eliquis.  Treatment: Eliquis has been resumed following Ablation procedure.    Thank you  Flakita Cole RN  Clinical Documentation  [phone], or via Perfect Serve  Options provided:  -- Secondary hypercoagulable state in a patient with atrial fibrillation  -- Other - I will add my own diagnosis  -- Disagree - Not applicable / Not valid  -- Disagree - Clinically unable to determine / Unknown  -- Refer to Clinical Documentation Reviewer    PROVIDER RESPONSE TEXT:    This patient has secondary hypercoagulable state in a patient with atrial   fibrillation.    Query created by: Flakita Cole on 2024 3:15 PM      QUERY TEXT:    Pt admitted with SVT. Pt noted to have hypotension requiring Pressor support.   If possible, please document in the progress notes and discharge summary if   you are evaluating and/or treating any of the following:    The medical record reflects the following:  Risk Factors: SVT, A/C CHF    Clinical Indicators:   07:27 98.3, 138, 20, 89/67  07:30 , RR 21, BP 61/46, MAP 52  08:00 , RR 21, BP 66/53, MAP 58  09:30 BP 67/47, MAP 53     CXR- No focal lung opacity or acute cardiopulmonary abnormality.    H&P- she was transported to ER where she was confirmed

## 2024-01-30 NOTE — PROGRESS NOTES
PHYSICAL THERAPY EVALUATION/DISCHARGE    Patient: Frannie Blake (79 y.o. female)  Date: 1/30/2024  Primary Diagnosis: Hypotension [I95.9]  Elevated troponin [R79.89]  Inappropriate sinus tachycardia [I47.11]  History of acute heart failure [Z86.79]  Hypotension, unspecified hypotension type [I95.9]  Procedure(s) (LRB):  Ablation SVT (N/A) 1 Day Post-Op   Precautions: Fall Risk                      ASSESSMENT AND RECOMMENDATIONS:  Based on the objective data below, the patient is overall mobilizing at independent level with mobility. Reports gait around unit as \"easy\" and discussed importance of energy conservation given HF status and patient reports understanding. Family is working on obtaining caregiver at home as patient lives alone. She has no Acute PT needs at this time but should continue to ambulate with nursing staff. She would benefit from Outpatient Cardiac Rehab once she is cleared/able to participate. Family is reporting concerns in regards to managing lifevest, medications, and diet and she could certainly benefit from assist for these concerns but this will not be improved with physical therapy. Will sign off.    Functional Outcome Measure:  The patient scored 23/24 on the [facility] outcome measure which is indicative of decreased likelihood of need for ongoing therapy upon dc.      Further skilled acute physical therapy is not indicated at this time.     PLAN :  Recommendation for discharge: (in order for the patient to meet his/her long term goals): outpatient cardiac rehab once able to participate    Other factors to consider for discharge: family report concerns in regards to managing medications, lifevest, and diet (hopeful for dc to SNF level of care)    IF patient discharges home will need the following DME: none     SUBJECTIVE:   Patient stated “If only walking was my only problem.”    OBJECTIVE DATA SUMMARY:     Past Medical History:   Diagnosis Date    CHF (congestive heart failure) ([facility])      Assistance: Supervision;Independent  Distance (ft): 200 Feet  Assistive Device: Gait belt  Base of Support: Narrowed  Speed/Nikia: Slow  Step Length: Right shortened;Left shortened  Gait Abnormalities: Decreased step clearance;Path deviations (path deviations x 2)                                                                                                                                                                                                                                                          [facility]®      Basic Mobility Inpatient Short Form (6-Clicks) Version 2    How much help is needed turning from your back to your side while in a flat bed without using bedrails?: None  How much help is needed moving from lying on your back to sitting on the side of a flat bed without using bedrails?: None  How much help is needed moving to and from a bed to a chair?: None  How much help is needed standing up from a chair using your arms?: None  How much help is needed walking in hospital room?: None  How much help is needed climbing 3-5 steps with a railing?: A Little    [facility] Inpatient Mobility Raw Score : 23  -PAC Inpatient T-Scale Score : 56.93     Cutoff score ?171,2,3 had higher odds of discharging home with home health or need of SNF/IPR.    1. Sheryl Hanna, Lonnie Quevedo Sandra D. Passek, Ramirez Hogue, Flo Hanna.  Validity of the -PAC “6-Clicks” Inpatient Daily Activity and Basic Mobility Short Forms. Physical Therapy Mar 2014, 94 (3) 379-391; DOI: 10.2522/ptj.81986662  2. David BERGMAN, Kathleen J, Paul J, Clinton J. Association of AM-PAC \"6-Clicks\" Basic Mobility and Daily Activity Scores With Discharge Destination. Phys Ther. 2021 Apr 4;101(4):nbnu848. doi: 10.1093/ptj/zmba756. PMID: 62653766.  3. Rajan QUINONEZ, Foreign TOWNSEND, Rebekah S, Darron TRAN, Brisa S. Activity Measure for Post-Acute Care \"6-Clicks\" Basic Mobility Scores Predict Discharge

## 2024-01-31 ENCOUNTER — TELEPHONE (OUTPATIENT)
Age: 79
End: 2024-01-31

## 2024-01-31 ENCOUNTER — TELEPHONE (OUTPATIENT)
Facility: HOSPITAL | Age: 79
End: 2024-01-31

## 2024-01-31 NOTE — TELEPHONE ENCOUNTER
HEART FAILURE NURSE NAVIGATOR POST DISCHARGE FOLLOW UP PHONE CALL     HF NN contacted patient by telephone to perform post hospital discharge follow up call.  Verified patient name and date of birth as identifiers.  Provided introduction to self and role.  Daughter states meeting with home health at this time.    Denies questions. Verbalizes understanding of discharge instructions. Has follow up appt with [facility] 2/6/24 and daughter will provide transportation.      Patient given opportunity to ask questions/express concerns.  All questions answered with good understanding.

## 2024-01-31 NOTE — PROGRESS NOTES
Spiritual Care Assessment/Progress Note  [facility]    Name: Frannie Blake MRN: [id number]    Age: 79 y.o.     Sex: female   Language: English     Date: 1/31/2024            Total Time Calculated: 26 min              Spiritual Assessment begun in [facility] 4 CV INTNSV CARE  Service Provided For:: Patient and family together  Referral/Consult From:: Rounding  Encounter Overview/Reason : Spiritual/Emotional Needs    Spiritual beliefs:      [x] Involved in a dionicio tradition/spiritual practice: Islam     [x] Supported by a dionicio community: [employer] [facility]     [] Claims no spiritual orientation:      [] Seeking spiritual identity:           [] Adheres to an individual form of spirituality:      [] Not able to assess:                Identified resources for coping and support system:   Support System: Family members       [] Prayer                  [] Devotional reading               [] Music                  [] Guided Imagery     [] Pet visits                                        [] Other: (COMMENT)     Specific area/focus of visit   Encounter:    Crisis:    Spiritual/Emotional needs: Type: Spiritual Support  Ritual, Rites and Sacraments:    Grief, Loss, and Adjustments:    Ethics/Mediation:    Behavioral Health:    Palliative Care:    Advance Care Planning:           Narrative:    Ms Kathleen and her daughter Haylie was present. Introduced self and spiritual health care.Ms Kathleen shared that she was feeling much better and looking forward to hopefully going home today. She also shared that she is a member of [facility] [facility] and that her  has visited on several occasions. No spiritual need note. Please contact [facility] for future services.      Idania Herman MDiv, [facility]   paging Service [phone] ([phone])

## 2024-01-31 NOTE — TELEPHONE ENCOUNTER
Daughter Haylie (on PHI form) called in and LM regarding should patient be monitoring BP daily and asking about remote BP monitoring.    Since last visit 1/22, patient was hospitalized on 1/28- 1/30 requiring ablation with Dr Mendes-- multiple meds held until follow up due to hypotension.     RN will review with Dai ROY regarding above-- patient was scheduled prior for 2/6 med titration visit however should it be change to hospital discharge visit?    Per Dai ROY:  If we have space for a once hour hospital discharge put her there since a lot was changed, if not she can keep her appointment on 2/6 bc we never actually saw her inpatient.  Yes, you can see if she is eligible for remote monitoring.     Scheduled patient for Friday 2/2 at 10am, okayd by Josette ROY. Sent message to remote monitoring program regarding patient enrollment.

## 2024-02-01 ENCOUNTER — TELEPHONE (OUTPATIENT)
Age: 79
End: 2024-02-01

## 2024-02-02 ENCOUNTER — OFFICE VISIT (OUTPATIENT)
Age: 79
End: 2024-02-02

## 2024-02-02 VITALS
DIASTOLIC BLOOD PRESSURE: 86 MMHG | BODY MASS INDEX: 23.37 KG/M2 | HEIGHT: 62 IN | RESPIRATION RATE: 16 BRPM | OXYGEN SATURATION: 98 % | TEMPERATURE: 97.7 F | SYSTOLIC BLOOD PRESSURE: 124 MMHG | WEIGHT: 127 LBS | HEART RATE: 64 BPM

## 2024-02-02 DIAGNOSIS — E03.8 OTHER SPECIFIED HYPOTHYROIDISM: ICD-10-CM

## 2024-02-02 DIAGNOSIS — Z09 HOSPITAL DISCHARGE FOLLOW-UP: ICD-10-CM

## 2024-02-02 DIAGNOSIS — I50.22 CHRONIC SYSTOLIC HEART FAILURE (HCC): Primary | ICD-10-CM

## 2024-02-02 RX ORDER — ALENDRONATE SODIUM 70 MG/1
70 TABLET ORAL
COMMUNITY
Start: 2024-01-31

## 2024-02-02 ASSESSMENT — ENCOUNTER SYMPTOMS
EYE PAIN: 0
COUGH: 0
CHEST TIGHTNESS: 0
SORE THROAT: 0
ABDOMINAL PAIN: 0
ABDOMINAL DISTENTION: 0
SHORTNESS OF BREATH: 0
BLOOD IN STOOL: 0

## 2024-02-02 NOTE — PATIENT INSTRUCTIONS
at the puncture site, or increasing swelling at the site. Please be aware that a “pea” or “marble” sized lump is normal, anything larger or increasing in size should be reported.  Bruising at the puncture site that enlarges or becomes painful (some bruising at the site is common and will go away in 7-14 days).  Dizziness, lightheadedness, fainting.    REMEMBER: If you feel something is an emergency or cannot be handled over the phone, call 911 or go to the closest emergency room.      FOLLOW UP CARE     Dr. Mendes in 1 month  NP in 3 months        Yannick Mendes MD  Cardiac Electrophysiology / Cardiology    [facility]  [facility]  [address] [facility], [address]   [address] [place] [address], [facility] [address]  [address] [address]  [phone] / [phone] Fax  [phone] / [phone] Fax        Atrial Flutter Ablation   Discharge Instructions      You have just had an Atrial Flutter Ablation. There were catheters temporarily placed in your heart through a puncture in the veins in your groin.        WHAT TO EXPECT     If you have had an Atrial Flutter Ablation please be aware that you may experience mild chest pain that will resolve within 24-48 hours.  If the chest pain persists or becomes severe, please call the office.    Mild to moderate, non-painful, bruising or mild swelling at the puncture site is not un-common, and will resolve in 7 - 14 days, and may extend down your thigh as it heals. Application of Ice to the site may help with any tenderness.    You have a small gauze dressing applied to the puncture site in your groin.  You may remove this the following morning.     It is not uncommon to feel palpitations during the healing phase after your ablation you're your palpitations last longer than 30 minutes, or you have recurrent tachycardia for a prolonged time, please contact the office.    You MAY receive a 30

## 2024-02-02 NOTE — PROGRESS NOTES
ADVANCED HEART FAILURE CENTER  [facility] in [place], [place]  Heart Failure Outpatient Clinic Note      Patient name: Frannie Blake  Patient : 1945  Patient MRN: [id number]  Date of service: 24      PCP: Heidy Perez MD  Cardiologist:  Dr. Mcguire/Dr. Mendes  [facility] cardiologist:  Dr. Quiñones       Chief Complaint   Patient presents with    Follow-Up from Hospital         ASSESSMENT:  Frannie Blake is a 79 y.o. female with recently diagnosed systolic heart failure and new onset A-flutter with RVR. Presented with NYHA class IV symptoms, EF 15-20%, RHC and LHC done on  showed normal filling pressures and reduced cardiac index of 1.6, moderate once vessel CAD and possible mid cavity variant of stress cardiomyopathy.  Admissions -24 and -24 for heart failure.  Had AVRT ablation on 24      INTERVAL HISTORY:  -VSS  -labs  reviewed: K 3.7; creat 0.8; alb 2.4;  TSH 13 (on )  -weight similar to hospital   -Frannie Blake is feeling fairly good.  She's only been staying in the house, but hasn't been having SOB with ADLs.  She thought she might have had some left foot swelling this morning.  No chest pain or palpitations.  + fatigue.  Having some brain fog/memory concerns and she wonders if it's related to her heart.  Denies any bleeding issues.  She does not like wearing the Lifevest, but has been.          RECOMMENDATIONS:  New Onset systolic heart failure (EF 15-20%) Stage C NYHA class II-III  Beta-blocker: continue Toprol XL 25 mg   ACEi/ARB/ARNI: add back Entresto at 0.5 tab of  bid   MRA: add back spironolactone next visit if labs stable   SGLT2i: Continue Jardiance  Lasix 20mg PRN for weight gain >3 pounds in 24 hours or 5 pounds in 1 week, appears euvolemic today  Cont allopurinol 100mg daily   RHC  showed normal filling pressures with low CI.  Will not start inotropes due to normal filling pressures and recent a flutter

## 2024-02-03 ENCOUNTER — TELEPHONE (OUTPATIENT)
Age: 79
End: 2024-02-03

## 2024-02-03 NOTE — TELEPHONE ENCOUNTER
Pt called [facility] due to complaint of redness of face and possible allergic reaction after starting Entresto.  Per patient, she started Entresto this morning and now she has red blotches on her face.  She denies swelling of face or lips and denies difficulty breathing or dizziness. Denies itching.  She denies these spots anywhere else on her body. Her voice sounds normal (she does not sound hoarse or garbled).  She has not had any other new medications or foods other then Entresto.  I advised her to discontinue Entresto and take a OTC benadryl.  I advised that if she started noticing swelling or itching of her face or lips or voice change like hoarseness or garbled speech (which would represent swelling of the airway) she should go to the ER to be evaluated immediately bc that would be a life threatening reaction. She states other then the redness she feels fine.  I asked her to call me back if her face redness does not get better with benadryl or gets worse. Pt verbalized understanding.

## 2024-02-05 ENCOUNTER — CARE COORDINATION (OUTPATIENT)
Facility: CLINIC | Age: 79
End: 2024-02-05

## 2024-02-05 DIAGNOSIS — I10 ESSENTIAL (PRIMARY) HYPERTENSION: Primary | Chronic | ICD-10-CM

## 2024-02-05 DIAGNOSIS — I50.23 ACUTE ON CHRONIC SYSTOLIC CONGESTIVE HEART FAILURE (HCC): ICD-10-CM

## 2024-02-05 RX ORDER — LISINOPRIL 2.5 MG/1
2.5 TABLET ORAL DAILY
Qty: 30 TABLET | Refills: 1 | Status: SHIPPED | OUTPATIENT
Start: 2024-02-05

## 2024-02-05 NOTE — TELEPHONE ENCOUNTER
Followed up with patient regarding weekend call from Dai ROY.    Patient reports that she has not had any further rash/reaction since stopping entresto and taking 1 dose of benadryl.    Will review with Josette ROY as at last visit plan was to start entresto have labs in 2 weeks and return for med titration visit.    Per NP  Josette Arana, HILARIO - Manuel Burrell, RN  Caller: Unspecified (2 days ago, 11:48 AM)  Odd.  That wasn't her first dose of entresto.  I was just restarting it after admission. Let's change to lisinopril 2.5mg daily, please, and see how she feels on that.              Reviewed above with patient, lisinopril sent to [facility]. Reviewed with patient to monitor for recurring rash (if so she is to stop medication and call us) or dizziness low BP.  Patient will still go for lab work next week. And will be seen on 2/14 with NP. Patient verbalized understanding, wrote down all instructions.

## 2024-02-05 NOTE — PROGRESS NOTES
Remote Patient Monitoring Treatment Plan    Received request from ACM/Erin Byrne RN  to order remote patient monitoring for in home monitoring of CHF; Condition managed by [facility] and HTN and order completed.     Patient will be monitoring blood pressure   pulse ox   weight.      Patient will engage in Remote Patient Monitoring each day to develop the skills necessary for self management.       RPM Care Team Responsibilities:   Alerts will be reviewed daily and addressed within 2-4 hours during operational hours (Monday -Friday 9 am-4 pm)  Alert response and intervention documented in patient medical record  Alert response escalated to PCP per protocol and documented in patient medical record  Patient monitored over approximately  days  Discharge from program based on self-management readiness    See care coordination encounters for additional details.

## 2024-02-05 NOTE — CARE COORDINATION
Remote Patient Kit Ordering Note      Date/Time:  2/5/2024 4:02 PM      [x] CCSS confirmed patient shipping address  [x] Patient will receive package over the next 1-3 business days. Someone 21 years or older must be present to sign for UPS delivery.  [x] HRS will contact patient within 24 hours, an HRS  will call the patient directly: If the patient does not answer, HRS will follow up with the clinical team notifying them about the unsuccessful attempt to contact the patient. HRS will make three call attempts to the patient.Provide patient with [facility] Virtual install number is: [phone].  [x] CTN will contact patient once equipment is active to welcome them to the program.                                                         [x] Hours of RPM monitoring - Monday-Friday 6920-3589; encourage patient to get vitals entered by Noon each day to have the alert addressed same day.  [x]CCSS mailed RPM Patient flyer to patient.                     All questions answered at this time. CTN made aware the RPM kit has been ordered.      CCSS notified patient of RPM equipment order.

## 2024-02-08 ENCOUNTER — TELEPHONE (OUTPATIENT)
Age: 79
End: 2024-02-08

## 2024-02-08 NOTE — TELEPHONE ENCOUNTER
Requested Prescriptions     Signed Prescriptions Disp Refills    apixaban (ELIQUIS) 5 MG TABS tablet 60 tablet 8     Sig: Take 1 tablet by mouth 2 times daily     Authorizing Provider: LEIF MENDES     Ordering User: BRENDEN MEDINA     Refills per verbal order from Dr. Mendes.  Last OV: 1/29/24 - hospital  Next OV: 2/29/24

## 2024-02-08 NOTE — TELEPHONE ENCOUNTER
Patient's daughter called in..    Medication Refill Request    Frannie Blake is requesting a refill of the following medication(s):   -Eliquis 5mg    Please send refill to:   [facility]/pharmacy #[id number] - [place], [place] - [address] - P [phone] - F [phone]

## 2024-02-08 NOTE — TELEPHONE ENCOUNTER
Pt's daughter Haylie called for a few reasons.   She needs her mother's Eliquis (sp?) refilled.  She's concerned about her mother's short-term memory deficits/dementia symptoms and would appreciate it if that could be discussed at next week's appt (Wed, 2/14/24) since her daughter will not be able to attend.  (A friend is bringing the pt.)    She would like an order to be put into home health for speech therapy.  She can be reached at [phone].

## 2024-02-09 ENCOUNTER — TELEPHONE (OUTPATIENT)
Age: 79
End: 2024-02-09

## 2024-02-09 DIAGNOSIS — R41.3 COMPLAINTS OF MEMORY DISTURBANCE: Primary | ICD-10-CM

## 2024-02-09 NOTE — TELEPHONE ENCOUNTER
Patient's daughter, Haylie, called to schedule appt for Pt. Advised scheduling is backed up (1-2 weeks) but someone will be in contact with her as soon as they can. Haylie verbalized understanding. Please call [phone]

## 2024-02-12 NOTE — PROGRESS NOTES
Negative for cough, chest tightness and shortness of breath.    Cardiovascular:  Negative for chest pain, palpitations and leg swelling.   Gastrointestinal:  Negative for abdominal distention, abdominal pain and blood in stool.        No appetite    Genitourinary:  Negative for dysuria and hematuria.   Musculoskeletal:  Negative for arthralgias and myalgias.   Skin:  Negative for rash and wound.   Neurological:  Negative for dizziness, weakness, light-headedness and headaches.        Some brain fog   Psychiatric/Behavioral:  Negative for confusion and dysphoric mood.         PHYSICAL EXAM:  /72 (Site: Right Upper Arm, Position: Sitting, Cuff Size: Small Adult)   Pulse 77   Temp 97.9 °F (36.6 °C) (Oral)   Resp 17   Ht 1.575 m (5' 2\")   Wt 57.9 kg (127 lb 9.6 oz)   SpO2 98%   BMI 23.34 kg/m²     Physical Exam  Constitutional:       General: She is not in acute distress.     Appearance: Normal appearance. She is normal weight. She is not ill-appearing.   HENT:      Head: Normocephalic and atraumatic.      Mouth/Throat:      Mouth: Mucous membranes are moist.      Pharynx: Oropharynx is clear.   Eyes:      General: No scleral icterus.     Conjunctiva/sclera: Conjunctivae normal.   Neck:      Vascular: No JVD.   Cardiovascular:      Rate and Rhythm: Normal rate and regular rhythm.      Pulses: Normal pulses.      Heart sounds: Normal heart sounds. No murmur heard.     No friction rub. No gallop.   Pulmonary:      Effort: Pulmonary effort is normal. No respiratory distress.      Breath sounds: Normal breath sounds.   Abdominal:      General: Bowel sounds are normal. There is no distension.      Palpations: Abdomen is soft.   Musculoskeletal:         General: No swelling or deformity.      Cervical back: Normal range of motion and neck supple.      Right lower leg: No edema.      Left lower leg: No edema.   Skin:     General: Skin is warm and dry.   Neurological:      General: No focal deficit present.

## 2024-02-13 DIAGNOSIS — E03.8 OTHER SPECIFIED HYPOTHYROIDISM: ICD-10-CM

## 2024-02-13 DIAGNOSIS — I50.22 CHRONIC SYSTOLIC HEART FAILURE (HCC): ICD-10-CM

## 2024-02-13 LAB
ALBUMIN SERPL-MCNC: 4.3 G/DL (ref 3.8–4.8)
ALBUMIN/GLOB SERPL: 2.2 {RATIO} (ref 1.2–2.2)
ALP SERPL-CCNC: 51 IU/L (ref 44–121)
ALT SERPL-CCNC: 16 IU/L (ref 0–32)
AST SERPL-CCNC: 24 IU/L (ref 0–40)
BILIRUB SERPL-MCNC: 0.6 MG/DL (ref 0–1.2)
BNP SERPL-MCNC: 108 PG/ML (ref 0–100)
BUN SERPL-MCNC: 10 MG/DL (ref 8–27)
BUN/CREAT SERPL: 10 (ref 12–28)
CALCIUM SERPL-MCNC: 9.5 MG/DL (ref 8.7–10.3)
CHLORIDE SERPL-SCNC: 99 MMOL/L (ref 96–106)
CO2 SERPL-SCNC: 24 MMOL/L (ref 20–29)
CREAT SERPL-MCNC: 1.03 MG/DL (ref 0.57–1)
EGFRCR SERPLBLD CKD-EPI 2021: 56 ML/MIN/1.73
GLOBULIN SER CALC-MCNC: 2 G/DL (ref 1.5–4.5)
GLUCOSE SERPL-MCNC: 83 MG/DL (ref 70–99)
MAGNESIUM SERPL-MCNC: 2.1 MG/DL (ref 1.6–2.3)
POTASSIUM SERPL-SCNC: 4.1 MMOL/L (ref 3.5–5.2)
PROT SERPL-MCNC: 6.3 G/DL (ref 6–8.5)
SODIUM SERPL-SCNC: 138 MMOL/L (ref 134–144)
T4 FREE SERPL-MCNC: 1.7 NG/DL (ref 0.82–1.77)
TSH SERPL DL<=0.005 MIU/L-ACNC: 7.62 UIU/ML (ref 0.45–4.5)

## 2024-02-14 ENCOUNTER — TELEPHONE (OUTPATIENT)
Age: 79
End: 2024-02-14

## 2024-02-14 ENCOUNTER — OFFICE VISIT (OUTPATIENT)
Age: 79
End: 2024-02-14
Payer: MEDICARE

## 2024-02-14 VITALS
DIASTOLIC BLOOD PRESSURE: 72 MMHG | SYSTOLIC BLOOD PRESSURE: 124 MMHG | TEMPERATURE: 97.9 F | WEIGHT: 127.6 LBS | RESPIRATION RATE: 17 BRPM | HEIGHT: 62 IN | OXYGEN SATURATION: 98 % | HEART RATE: 77 BPM | BODY MASS INDEX: 23.48 KG/M2

## 2024-02-14 DIAGNOSIS — I50.22 CHRONIC SYSTOLIC HEART FAILURE (HCC): Primary | ICD-10-CM

## 2024-02-14 PROCEDURE — 1123F ACP DISCUSS/DSCN MKR DOCD: CPT | Performed by: NURSE PRACTITIONER

## 2024-02-14 PROCEDURE — 3078F DIAST BP <80 MM HG: CPT | Performed by: NURSE PRACTITIONER

## 2024-02-14 PROCEDURE — 1090F PRES/ABSN URINE INCON ASSESS: CPT | Performed by: NURSE PRACTITIONER

## 2024-02-14 PROCEDURE — 99214 OFFICE O/P EST MOD 30 MIN: CPT | Performed by: NURSE PRACTITIONER

## 2024-02-14 PROCEDURE — 1036F TOBACCO NON-USER: CPT | Performed by: NURSE PRACTITIONER

## 2024-02-14 PROCEDURE — G8484 FLU IMMUNIZE NO ADMIN: HCPCS | Performed by: NURSE PRACTITIONER

## 2024-02-14 PROCEDURE — 3074F SYST BP LT 130 MM HG: CPT | Performed by: NURSE PRACTITIONER

## 2024-02-14 PROCEDURE — G8427 DOCREV CUR MEDS BY ELIG CLIN: HCPCS | Performed by: NURSE PRACTITIONER

## 2024-02-14 PROCEDURE — 1111F DSCHRG MED/CURRENT MED MERGE: CPT | Performed by: NURSE PRACTITIONER

## 2024-02-14 PROCEDURE — G8400 PT W/DXA NO RESULTS DOC: HCPCS | Performed by: NURSE PRACTITIONER

## 2024-02-14 PROCEDURE — G8420 CALC BMI NORM PARAMETERS: HCPCS | Performed by: NURSE PRACTITIONER

## 2024-02-14 RX ORDER — SPIRONOLACTONE 25 MG/1
12.5 TABLET ORAL DAILY
Qty: 90 TABLET | Refills: 1 | Status: SHIPPED | OUTPATIENT
Start: 2024-02-14

## 2024-02-14 NOTE — TELEPHONE ENCOUNTER
----- Message from HILARIO Merino NP sent at 2/13/2024  3:43 PM EST -----  Can you please let Ms. Blake know that her TSH is still high, and I recommend following up with her PCP?   Can we please fax the labs to her PCP as well?    In the chart it looks like she was followed pretty closely with her thyroid levels, so I want to defer changes to her PCP.   She likely needs a slight dose adjustment.   Her other labs looked very good.     Addressed at 2/14 appt. Patient is in between PCPs at this time, she will follow up about this once established with new PCP

## 2024-02-14 NOTE — PATIENT INSTRUCTIONS
Medication changes:    Restart Spironolactone 12.5mg daily  Please monitor for low BP, dizziness, we will call in 1 week to go over blood pressure, weights and symptoms    Please record blood pressure and heart rate daily before medication and two hours after medication, please record weight daily upon waking/after using the bathroom. Keep a written records of your weights and blood pressure and bring to your next appointment. If you have a weight gain of 3 or more pounds overnight OR 5 or more pounds in one week, new/worsened shortness of breath or swelling, or if your blood pressure begins to consistently run below 90/60 and/or you begin to experience dizziness or lightheadedness please contact our office at [phone] option 2.    Please take this to your pharmacy to notify them of the change in medications.     Testing Ordered:    Lab work has been ordered to be completed in 2 weeks (around 2/27)  Please present to a [facility] location of your choice with the orders provided to have lab work done. If results are normal you will just receive a message through my chart. Please make sure to have an active my chart account. Having issues logging in? Contact the [facility] Help Desk at [phone].Our office will notify you of any abnormal results requiring changes to your current plan of care.     Cardiac rehab referral placed, please call to schedule appt [phone]    Other Recommendations:     Follow up with primary care regarding thyroid levels-- please let us know once you have established care so that we can fax labs.      Ensure your drinking an adequate amount of water with a goal of 6-8 eight ounce glasses (1.5-2 liters) of fluid daily. Your urine should be clear and light yellow straw colored.     Follow up on 3/13 at 1030 (you will see Dr Morel and Dr Mendes on 2/29)    Thank you for allowing us the privilege of being a part of your healthcare team! Please do not hesitate to contact our office

## 2024-02-16 ENCOUNTER — TELEPHONE (OUTPATIENT)
Age: 79
End: 2024-02-16

## 2024-02-16 ENCOUNTER — CARE COORDINATION (OUTPATIENT)
Facility: CLINIC | Age: 79
End: 2024-02-16

## 2024-02-16 NOTE — TELEPHONE ENCOUNTER
----- Message from Teressa Menjivar RN sent at 2/16/2024  2:56 PM EST -----  Pls call patient's daughter Haylie at [phone] regarding patient's TSH. Apparently she can't get into PCP until May.    Spoke with daughter--  Upcoming appt with new PCP---Dr Santos, not until May 28th.  Can thyroid levels to be re-rechecked with next set of labs?  Asking if we can help with patient Anxiety  Asking if we can add speech to HH orders.    Lilli Sibley, HILARIO - NP   to Me 2/17/24  5:38 PM  The speech therapy is fine to add to the HH orders and we can recheck levels for her thyroid in a few months, she needs sometime for the synthroid to fully make a difference.   We can't manage her anxiety, we aren't equipped for the follow up.  She may want to at least make an appt with her current PCP to start management and transfer it to her new PCP.    Called daughter to review above, speech ordered called in to care adv HH-- TORB given to [facility]. Discussed about anixety management must be throught PCP not heart failure-- did mention to look into therapy/counselors in the meantime.  Daughter has more questions about thyroid levels, will review with NP.    Per Lilli ROY-- no need to recheck sooner, will send lab results to new PCP after talking to daughter--- they will see if patient new PCP visit can be moved up from May (if any cancellations)  Faxed labs to [phone]

## 2024-02-16 NOTE — CARE COORDINATION
Received HRS Notification : refused to complete install with [facility]. Patient was available;  to discuss. They would like to return equipment.   Routed to  ACM/CTN  to update.

## 2024-02-21 ENCOUNTER — TELEPHONE (OUTPATIENT)
Age: 79
End: 2024-02-21

## 2024-02-21 NOTE — TELEPHONE ENCOUNTER
----- Message from Manuel Hinson, RN sent at 2/14/2024 12:03 PM EST -----  Call about BP logs and weights 1 week after starting spironolactone      LM for return call to review logs.    2/16- 110/79-88  2/17- 133/81  2/19- 115/70  2/20- 108/61  2/21- 109/65  2/22- 128/73  2/23- 113/74    Weight stable at 123  No dizziness, feels better    Lilli Sibley, APRN - NP  Manuel Hinson, RN  Caller: Unspecified (5 days ago,  2:20 PM)  Ok, continue current dosing for now    Called patient regarding above. LM for return call to alert of above

## 2024-02-23 ENCOUNTER — CARE COORDINATION (OUTPATIENT)
Facility: CLINIC | Age: 79
End: 2024-02-23

## 2024-02-23 DIAGNOSIS — I50.23 ACUTE ON CHRONIC SYSTOLIC CONGESTIVE HEART FAILURE (HCC): ICD-10-CM

## 2024-02-23 DIAGNOSIS — I10 ESSENTIAL (PRIMARY) HYPERTENSION: Primary | Chronic | ICD-10-CM

## 2024-02-23 NOTE — CARE COORDINATION
CCSS placed call to patient to arrange RPM kit  through UPS.     Reviewed with patient how to pack equipment in original packing.     Verified patient's availability to schedule UPS  time.     UPS  time requested. Anticipated  date range 2/26-2/27

## 2024-02-23 NOTE — PROGRESS NOTES
Remote Patient Order Discontinued    Received request from Erin Jesus RN  to discontinue order for remote patient monitoring of CHF and HTN and order completed.

## 2024-02-26 ENCOUNTER — TELEPHONE (OUTPATIENT)
Age: 79
End: 2024-02-26

## 2024-02-26 DIAGNOSIS — I50.22 CHRONIC SYSTOLIC HEART FAILURE (HCC): Primary | ICD-10-CM

## 2024-02-26 NOTE — TELEPHONE ENCOUNTER
----- Message from Manuel Hinson RN sent at 2/23/2024  3:24 PM EST -----  Fax labs to  [facility] on Monday    Labs faxed to above, called patient to confirm she will go tomorrow as discussed.

## 2024-02-27 DIAGNOSIS — I50.22 CHRONIC SYSTOLIC HEART FAILURE (HCC): ICD-10-CM

## 2024-02-28 PROBLEM — I50.22 CHRONIC SYSTOLIC CONGESTIVE HEART FAILURE (HCC): Status: ACTIVE | Noted: 2024-01-14

## 2024-02-28 PROBLEM — I47.19 AVNRT (AV NODAL RE-ENTRY TACHYCARDIA): Status: ACTIVE | Noted: 2024-02-28

## 2024-02-28 LAB
BNP SERPL-MCNC: 45 PG/ML (ref 0–100)
BUN SERPL-MCNC: 22 MG/DL (ref 8–27)
BUN/CREAT SERPL: 20 (ref 12–28)
CALCIUM SERPL-MCNC: 9.9 MG/DL (ref 8.7–10.3)
CHLORIDE SERPL-SCNC: 99 MMOL/L (ref 96–106)
CO2 SERPL-SCNC: 23 MMOL/L (ref 20–29)
CREAT SERPL-MCNC: 1.12 MG/DL (ref 0.57–1)
EGFRCR SERPLBLD CKD-EPI 2021: 50 ML/MIN/1.73
GLUCOSE SERPL-MCNC: 92 MG/DL (ref 70–99)
POTASSIUM SERPL-SCNC: 4.8 MMOL/L (ref 3.5–5.2)
SODIUM SERPL-SCNC: 138 MMOL/L (ref 134–144)

## 2024-02-29 ENCOUNTER — OFFICE VISIT (OUTPATIENT)
Age: 79
End: 2024-02-29
Payer: MEDICARE

## 2024-02-29 ENCOUNTER — TELEPHONE (OUTPATIENT)
Age: 79
End: 2024-02-29

## 2024-02-29 ENCOUNTER — ANCILLARY PROCEDURE (OUTPATIENT)
Age: 79
End: 2024-02-29
Payer: MEDICARE

## 2024-02-29 VITALS
BODY MASS INDEX: 23 KG/M2 | SYSTOLIC BLOOD PRESSURE: 118 MMHG | DIASTOLIC BLOOD PRESSURE: 78 MMHG | OXYGEN SATURATION: 98 % | WEIGHT: 125 LBS | HEIGHT: 62 IN | HEART RATE: 68 BPM | RESPIRATION RATE: 18 BRPM

## 2024-02-29 VITALS
WEIGHT: 124.7 LBS | SYSTOLIC BLOOD PRESSURE: 112 MMHG | HEART RATE: 71 BPM | OXYGEN SATURATION: 100 % | BODY MASS INDEX: 22.95 KG/M2 | DIASTOLIC BLOOD PRESSURE: 62 MMHG | HEIGHT: 62 IN

## 2024-02-29 DIAGNOSIS — I50.22 CHRONIC SYSTOLIC CONGESTIVE HEART FAILURE (HCC): ICD-10-CM

## 2024-02-29 DIAGNOSIS — I50.22 CHRONIC SYSTOLIC HEART FAILURE (HCC): ICD-10-CM

## 2024-02-29 DIAGNOSIS — I47.19 AVNRT (AV NODAL RE-ENTRY TACHYCARDIA): Primary | ICD-10-CM

## 2024-02-29 DIAGNOSIS — I42.0 DILATED CARDIOMYOPATHY (HCC): ICD-10-CM

## 2024-02-29 DIAGNOSIS — I10 ESSENTIAL (PRIMARY) HYPERTENSION: Chronic | ICD-10-CM

## 2024-02-29 DIAGNOSIS — I50.22 CHRONIC SYSTOLIC CONGESTIVE HEART FAILURE (HCC): Primary | ICD-10-CM

## 2024-02-29 PROCEDURE — 1111F DSCHRG MED/CURRENT MED MERGE: CPT | Performed by: SPECIALIST

## 2024-02-29 PROCEDURE — G8484 FLU IMMUNIZE NO ADMIN: HCPCS | Performed by: SPECIALIST

## 2024-02-29 PROCEDURE — 99214 OFFICE O/P EST MOD 30 MIN: CPT | Performed by: INTERNAL MEDICINE

## 2024-02-29 PROCEDURE — 99215 OFFICE O/P EST HI 40 MIN: CPT | Performed by: SPECIALIST

## 2024-02-29 PROCEDURE — 3078F DIAST BP <80 MM HG: CPT | Performed by: INTERNAL MEDICINE

## 2024-02-29 PROCEDURE — G8427 DOCREV CUR MEDS BY ELIG CLIN: HCPCS | Performed by: INTERNAL MEDICINE

## 2024-02-29 PROCEDURE — G8400 PT W/DXA NO RESULTS DOC: HCPCS | Performed by: SPECIALIST

## 2024-02-29 PROCEDURE — 1123F ACP DISCUSS/DSCN MKR DOCD: CPT | Performed by: INTERNAL MEDICINE

## 2024-02-29 PROCEDURE — 1090F PRES/ABSN URINE INCON ASSESS: CPT | Performed by: INTERNAL MEDICINE

## 2024-02-29 PROCEDURE — 1036F TOBACCO NON-USER: CPT | Performed by: INTERNAL MEDICINE

## 2024-02-29 PROCEDURE — 3074F SYST BP LT 130 MM HG: CPT | Performed by: SPECIALIST

## 2024-02-29 PROCEDURE — G8420 CALC BMI NORM PARAMETERS: HCPCS | Performed by: INTERNAL MEDICINE

## 2024-02-29 PROCEDURE — 3074F SYST BP LT 130 MM HG: CPT | Performed by: INTERNAL MEDICINE

## 2024-02-29 PROCEDURE — G8400 PT W/DXA NO RESULTS DOC: HCPCS | Performed by: INTERNAL MEDICINE

## 2024-02-29 PROCEDURE — G8427 DOCREV CUR MEDS BY ELIG CLIN: HCPCS | Performed by: SPECIALIST

## 2024-02-29 PROCEDURE — 1111F DSCHRG MED/CURRENT MED MERGE: CPT | Performed by: INTERNAL MEDICINE

## 2024-02-29 PROCEDURE — 93308 TTE F-UP OR LMTD: CPT | Performed by: SPECIALIST

## 2024-02-29 PROCEDURE — G8420 CALC BMI NORM PARAMETERS: HCPCS | Performed by: SPECIALIST

## 2024-02-29 PROCEDURE — 93000 ELECTROCARDIOGRAM COMPLETE: CPT | Performed by: SPECIALIST

## 2024-02-29 PROCEDURE — 3078F DIAST BP <80 MM HG: CPT | Performed by: SPECIALIST

## 2024-02-29 PROCEDURE — 1123F ACP DISCUSS/DSCN MKR DOCD: CPT | Performed by: SPECIALIST

## 2024-02-29 PROCEDURE — 1090F PRES/ABSN URINE INCON ASSESS: CPT | Performed by: SPECIALIST

## 2024-02-29 PROCEDURE — 1036F TOBACCO NON-USER: CPT | Performed by: SPECIALIST

## 2024-02-29 PROCEDURE — G8484 FLU IMMUNIZE NO ADMIN: HCPCS | Performed by: INTERNAL MEDICINE

## 2024-02-29 RX ORDER — PIMECROLIMUS 10 MG/G
1 CREAM TOPICAL 2 TIMES DAILY
COMMUNITY
Start: 2024-02-12

## 2024-02-29 ASSESSMENT — PATIENT HEALTH QUESTIONNAIRE - PHQ9
SUM OF ALL RESPONSES TO PHQ QUESTIONS 1-9: 0
1. LITTLE INTEREST OR PLEASURE IN DOING THINGS: 0
2. FEELING DOWN, DEPRESSED OR HOPELESS: 0
SUM OF ALL RESPONSES TO PHQ QUESTIONS 1-9: 0
SUM OF ALL RESPONSES TO PHQ9 QUESTIONS 1 & 2: 0
SUM OF ALL RESPONSES TO PHQ QUESTIONS 1-9: 0
SUM OF ALL RESPONSES TO PHQ QUESTIONS 1-9: 0

## 2024-02-29 NOTE — TELEPHONE ENCOUNTER
----- Message from HILARIO Berumen NP sent at 2/28/2024 12:36 PM EST -----  Please call Frannie Palomouel to discuss their abnormal lab results. Her potassium is trending up, not enough to stop her MRA but I would have her stay hydrated and repeat in 1 week.     Called patientNADIR to review above labs.     Patient/daughter called back on speaker-- reviewed above, patient will go to [facility] next week for re-draw, no further questions.

## 2024-02-29 NOTE — PATIENT INSTRUCTIONS
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    Obtain 2 week extended Holter in 2 months (Elvi)  FU with in 3 months

## 2024-02-29 NOTE — PATIENT INSTRUCTIONS
Schedule limited echocardiogram. Our office will call with results.     Follow up with Dr. Mcguire in 2 months.

## 2024-02-29 NOTE — PROGRESS NOTES
90.5 01/30/2024 05:01 AM       Lab Results   Component Value Date/Time    TSH 7.620 02/12/2024 09:01 AM         Lab Results   Component Value Date/Time    CHOL 118 01/15/2024 12:32 AM    HDL 26 01/15/2024 12:32 AM                Please note that this dictation was completed with [employer], the computer voice recognition software.  Quite often unanticipated grammatical, syntax, homophones, and other interpretative errors are inadvertently transcribed by the computer software.  Please disregard these errors.  Please excuse any errors that have escaped final proofreading.

## 2024-02-29 NOTE — PROGRESS NOTES
by mouth daily    buPROPion (WELLBUTRIN XL) 150 MG extended release tablet TAKE 1 TABLET BY MOUTH EVERY DAY IN THE MORNING FOR 90 DAYS    vitamin D (CHOLECALCIFEROL) 125 MCG (5000 UT) CAPS capsule 1 tablet Orally Once a day    folic acid (FOLVITE) 1 MG tablet Take 1 tablet by mouth daily    levothyroxine (SYNTHROID) 75 MCG tablet 1 tablet in the morning on an empty stomach Orally Once a day for 30 day(s)    metoprolol succinate (TOPROL XL) 25 MG extended release tablet TAKE 1 TABLET BY MOUTH EVERY DAY FOR 90 DAYS     No current facility-administered medications for this visit.      Diagnostic Data Review:     01/14/24    ECHO (TTE) COMPLETE (PRN CONTRAST/BUBBLE/STRAIN/3D) 01/15/2024 10:04 AM (Final)    Interpretation Summary    Left Ventricle: Severely reduced left ventricular systolic function with a visually estimated EF of 15 - 20%. Left ventricle size is normal. Normal wall thickness. Severe global hypokinesis present. Abnormal diastolic function.    Right Ventricle: Low normal systolic function. TAPSE is normal. TAPSE is 1.8 cm.    Mitral Valve: Mild regurgitation.    Tricuspid Valve: Mildly elevated RVSP. The estimated RVSP is 43 mmHg.    Extracardiac: Medium sized left pleural effusion.    Image quality is good.    Signed by: Abram Mcguire MD on 1/15/2024 10:04 AM    01/14/24    CARDIAC PROCEDURE 01/16/2024  4:23 PM (Final)    Conclusion  Findings  1.  Reduced cardiac output and index.  Cardiac -1.6 L/min/m²  2.  Moderate one-vessel coronary artery disease.  This involves proximal to mid LAD with 60% stenosis  3.  Severe cardiomyopathy with overall EF of about 25% and LV dilatation.  On LV gram, apical and basal segments move normally and may be slightly hyperdynamic.  Possibility of midcavity variant of stress cardiomyopathy cannot be completely excluded especially in light of accompanying changes including ST elevations in certain leads as well as extreme QTc prolongation.  4.  Normal right and left-sided

## 2024-03-06 LAB
ECHO BSA: 1.57 M2
ECHO LV EDV A2C: 24 ML
ECHO LV EDV A4C: 33 ML
ECHO LV EDV BP: 28 ML (ref 56–104)
ECHO LV EDV INDEX A4C: 21 ML/M2
ECHO LV EDV INDEX BP: 18 ML/M2
ECHO LV EDV NDEX A2C: 15 ML/M2
ECHO LV EJECTION FRACTION A2C: 64 %
ECHO LV EJECTION FRACTION A4C: 67 %
ECHO LV EJECTION FRACTION BIPLANE: 65 % (ref 55–100)
ECHO LV ESV A2C: 9 ML
ECHO LV ESV A4C: 11 ML
ECHO LV ESV BP: 10 ML (ref 19–49)
ECHO LV ESV INDEX A2C: 6 ML/M2
ECHO LV ESV INDEX A4C: 7 ML/M2
ECHO LV ESV INDEX BP: 6 ML/M2
ECHO LV FRACTIONAL SHORTENING: 38 % (ref 28–44)
ECHO LV INTERNAL DIMENSION DIASTOLE INDEX: 2.56 CM/M2
ECHO LV INTERNAL DIMENSION DIASTOLIC: 4 CM (ref 3.9–5.3)
ECHO LV INTERNAL DIMENSION SYSTOLIC INDEX: 1.6 CM/M2
ECHO LV INTERNAL DIMENSION SYSTOLIC: 2.5 CM
ECHO LV IVSD: 1.2 CM (ref 0.6–0.9)
ECHO LV MASS 2D: 145.6 G (ref 67–162)
ECHO LV MASS INDEX 2D: 93.4 G/M2 (ref 43–95)
ECHO LV POSTERIOR WALL DIASTOLIC: 1 CM (ref 0.6–0.9)
ECHO LV RELATIVE WALL THICKNESS RATIO: 0.5

## 2024-03-07 DIAGNOSIS — I50.22 CHRONIC SYSTOLIC CONGESTIVE HEART FAILURE (HCC): ICD-10-CM

## 2024-03-08 ENCOUNTER — TELEPHONE (OUTPATIENT)
Age: 79
End: 2024-03-08

## 2024-03-08 LAB
BUN SERPL-MCNC: 22 MG/DL (ref 8–27)
BUN/CREAT SERPL: 22 (ref 12–28)
CALCIUM SERPL-MCNC: 10.1 MG/DL (ref 8.7–10.3)
CHLORIDE SERPL-SCNC: 100 MMOL/L (ref 96–106)
CO2 SERPL-SCNC: 22 MMOL/L (ref 20–29)
CREAT SERPL-MCNC: 1.01 MG/DL (ref 0.57–1)
EGFRCR SERPLBLD CKD-EPI 2021: 57 ML/MIN/1.73
GLUCOSE SERPL-MCNC: 92 MG/DL (ref 70–99)
POTASSIUM SERPL-SCNC: 4.7 MMOL/L (ref 3.5–5.2)
SODIUM SERPL-SCNC: 138 MMOL/L (ref 134–144)

## 2024-03-13 ENCOUNTER — OFFICE VISIT (OUTPATIENT)
Age: 79
End: 2024-03-13
Payer: MEDICARE

## 2024-03-13 VITALS
BODY MASS INDEX: 22.95 KG/M2 | OXYGEN SATURATION: 99 % | SYSTOLIC BLOOD PRESSURE: 118 MMHG | HEART RATE: 57 BPM | TEMPERATURE: 96.7 F | DIASTOLIC BLOOD PRESSURE: 72 MMHG | WEIGHT: 125.5 LBS | RESPIRATION RATE: 16 BRPM

## 2024-03-13 DIAGNOSIS — I50.22 CHRONIC SYSTOLIC CONGESTIVE HEART FAILURE (HCC): Primary | ICD-10-CM

## 2024-03-13 PROCEDURE — G8484 FLU IMMUNIZE NO ADMIN: HCPCS | Performed by: NURSE PRACTITIONER

## 2024-03-13 PROCEDURE — 3078F DIAST BP <80 MM HG: CPT | Performed by: NURSE PRACTITIONER

## 2024-03-13 PROCEDURE — 1036F TOBACCO NON-USER: CPT | Performed by: NURSE PRACTITIONER

## 2024-03-13 PROCEDURE — 1123F ACP DISCUSS/DSCN MKR DOCD: CPT | Performed by: NURSE PRACTITIONER

## 2024-03-13 PROCEDURE — 3074F SYST BP LT 130 MM HG: CPT | Performed by: NURSE PRACTITIONER

## 2024-03-13 PROCEDURE — G8427 DOCREV CUR MEDS BY ELIG CLIN: HCPCS | Performed by: NURSE PRACTITIONER

## 2024-03-13 PROCEDURE — 1090F PRES/ABSN URINE INCON ASSESS: CPT | Performed by: NURSE PRACTITIONER

## 2024-03-13 PROCEDURE — G8400 PT W/DXA NO RESULTS DOC: HCPCS | Performed by: NURSE PRACTITIONER

## 2024-03-13 PROCEDURE — 99214 OFFICE O/P EST MOD 30 MIN: CPT | Performed by: NURSE PRACTITIONER

## 2024-03-13 PROCEDURE — G8420 CALC BMI NORM PARAMETERS: HCPCS | Performed by: NURSE PRACTITIONER

## 2024-03-13 ASSESSMENT — ENCOUNTER SYMPTOMS
COUGH: 0
ABDOMINAL DISTENTION: 0
BLOOD IN STOOL: 0
EYE PAIN: 0
CHEST TIGHTNESS: 0
ABDOMINAL PAIN: 0
SORE THROAT: 0
SHORTNESS OF BREATH: 0

## 2024-03-13 NOTE — PROGRESS NOTES
levothyroxine (SYNTHROID) 75 MCG tablet, 1 tablet in the morning on an empty stomach Orally Once a day for 30 day(s), Disp: , Rfl:     metoprolol succinate (TOPROL XL) 25 MG extended release tablet, TAKE 1 TABLET BY MOUTH EVERY DAY FOR 90 DAYS, Disp: , Rfl:     PATIENT CARE TEAM:  Patient Care Team:  Heidy Perez MD as PCP - General  Lilli Sibley APRN - NP as Consulting Provider (Nurse Practitioner)  Dai Rome APRN - NP as Consulting Provider (Nurse Practitioner)     Thank you for allowing me to participate in this patient's care.    HILARIO Casiano NP   [facility]  [facility]  [address] [place] [address], [address]  Phone: [phone]    On this date 3/13/2024, I have spent a total time of 35 minutes personally reviewing new vitals, test results, notes from recent visits, face to face encounter/physical exam of patient with counseling, writing orders, performing medical decision making, and documenting.

## 2024-03-13 NOTE — PATIENT INSTRUCTIONS
Medication changes:    None    Please take this to your pharmacy to notify them of the change in medications.     Testing Ordered:    None    Other Recommendations:      Please record blood pressure and heart rate daily before medication and two hours after medication, please record weight daily upon waking/after using the bathroom. Keep a written records of your weights and blood pressure and bring to your next appointment. If you have a weight gain of 3 or more pounds overnight OR 5 or more pounds in one week, new/worsened shortness of breath or swelling, or if your blood pressure begins to consistently run below 90/60 and/or you begin to experience dizziness or lightheadedness please contact our office at [phone] option 2.    Ensure your drinking an adequate amount of water with a goal of 6-8 eight ounce glasses (1.5-2 liters) of fluid daily. Your urine should be clear and light yellow straw colored.     Follow up mid April after Cardiac MRI with [facility]    Thank you for allowing us the privilege of being a part of your healthcare team! Please do not hesitate to contact our office at [phone] option 2 with any questions or concerns.     We are restarting a monthly heart failure support group, this will be the last Wednesday of every month from 5-6pm at [facility]. If you would like to attend you will need to RSVP to [email or web address]

## 2024-03-26 ENCOUNTER — HOSPITAL ENCOUNTER (OUTPATIENT)
Facility: HOSPITAL | Age: 79
Discharge: HOME | End: 2024-03-29
Attending: NURSE PRACTITIONER
Payer: MEDICARE

## 2024-03-26 DIAGNOSIS — I50.22 CHRONIC SYSTOLIC HEART FAILURE (HCC): ICD-10-CM

## 2024-03-26 PROCEDURE — A9579 GAD-BASE MR CONTRAST NOS,1ML: HCPCS | Performed by: NURSE PRACTITIONER

## 2024-03-26 PROCEDURE — 6360000004 HC RX CONTRAST MEDICATION: Performed by: NURSE PRACTITIONER

## 2024-03-26 PROCEDURE — 75561 CARDIAC MRI FOR MORPH W/DYE: CPT

## 2024-03-26 PROCEDURE — 75561 CARDIAC MRI FOR MORPH W/DYE: CPT | Performed by: INTERNAL MEDICINE

## 2024-03-26 RX ADMIN — GADOTERIDOL 17 ML: 279.3 INJECTION, SOLUTION INTRAVENOUS at 13:32

## 2024-03-27 RX ORDER — LISINOPRIL 2.5 MG/1
2.5 TABLET ORAL DAILY
Qty: 30 TABLET | Refills: 1 | Status: SHIPPED | OUTPATIENT
Start: 2024-03-27

## 2024-03-27 NOTE — TELEPHONE ENCOUNTER
Requested Prescriptions     Pending Prescriptions Disp Refills    lisinopril (PRINIVIL;ZESTRIL) 2.5 MG tablet 30 tablet 1     Sig: Take 1 tablet by mouth daily      Last office visit 3/13; titration f/u 4/16/24

## 2024-04-11 ENCOUNTER — TELEPHONE (OUTPATIENT)
Age: 79
End: 2024-04-11

## 2024-04-11 NOTE — TELEPHONE ENCOUNTER
Telephone Call RE:  Appointment reminder     Outcome:     [x] Patient confirmed appointment   [] Patient rescheduled appointment for    [] Unable to reach  [] Left message              [x] Other:       Pt not at home.  She thinks this will work but will call if, for some reason, it does not.

## 2024-04-15 ASSESSMENT — ENCOUNTER SYMPTOMS
CHEST TIGHTNESS: 0
ABDOMINAL PAIN: 0
SORE THROAT: 0
BLOOD IN STOOL: 0
ABDOMINAL DISTENTION: 0
SHORTNESS OF BREATH: 0
EYE PAIN: 0
COUGH: 0

## 2024-04-15 NOTE — PROGRESS NOTES
ADVANCED HEART FAILURE CENTER  [facility] in [place], [place]  Heart Failure Outpatient Clinic Note      Patient name: Frannie Blake  Patient : 1945  Patient MRN: [id number]  Date of service: 24      PCP: Heidy Perez MD  Cardiologist:  Dr. Mcguire/Dr. Mendes  [facility] cardiologist:  Dr. Quiñones       Chief Complaint   Patient presents with    Congestive Heart Failure    Follow-up         ASSESSMENT:  Frannie Blake is a 79 y.o. female with recently diagnosed systolic heart failure and new onset A-flutter with RVR. Presented with NYHA class IV symptoms, EF 15-20%, RHC and LHC done on  showed normal filling pressures and reduced cardiac index of 1.6, moderate once vessel CAD and possible mid cavity variant of stress cardiomyopathy.  LVEF recovered to 60-65% () after AVNRT ablation (24).  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         INTERVAL HISTORY:  -Frannie Blake  presents for f/u after cMRI.  She is feeling quite well.   She denies SOB, WONG, orthopnea, swelling, chest pain, palpitations, dizziness.  She is taking daily walks for exercise.  Her weights and BP have been stable at home.  She is compliant with all medications.    RECOMMENDATIONS:  HFimpEF (EF 15-20 % to 60-65%) Stage C NYHA  class I-II  Beta-blocker: continue Toprol XL 25 mg   ACEi/ARB/ARNI: Continue Lisinopril 2.5mg daily   MRA: Continue spironolactone 12.5mg daily    SGLT2i: Continue Jardiance  Lasix 20mg PRN for weight gain >3 pounds in 24 hours or 5 pounds in 1 week.  Has not needed.   Cont allopurinol 100mg daily   No longer requires life vest  Cardiac MRI done 3/26/24--> showed EF of 60% and  no features of recent or old myocardial infarction, no features of infiltrative sarcoidosis or cardiac amyloidosis, and no features of inflammatory myocarditis  Referral to sleep medicine to r/o BRANDON- scheduled     A-flutter with RVR  Amiodarone discontinued due to bradycardia  Anticoagulation

## 2024-04-16 ENCOUNTER — OFFICE VISIT (OUTPATIENT)
Age: 79
End: 2024-04-16
Payer: MEDICARE

## 2024-04-16 VITALS
OXYGEN SATURATION: 94 % | TEMPERATURE: 97.4 F | HEART RATE: 66 BPM | DIASTOLIC BLOOD PRESSURE: 78 MMHG | RESPIRATION RATE: 16 BRPM | BODY MASS INDEX: 23.37 KG/M2 | WEIGHT: 127 LBS | SYSTOLIC BLOOD PRESSURE: 118 MMHG | HEIGHT: 62 IN

## 2024-04-16 DIAGNOSIS — I50.32 HEART FAILURE WITH IMPROVED EJECTION FRACTION (HFIMPEF) (HCC): Primary | ICD-10-CM

## 2024-04-16 PROCEDURE — 1036F TOBACCO NON-USER: CPT | Performed by: NURSE PRACTITIONER

## 2024-04-16 PROCEDURE — 3078F DIAST BP <80 MM HG: CPT | Performed by: NURSE PRACTITIONER

## 2024-04-16 PROCEDURE — 1090F PRES/ABSN URINE INCON ASSESS: CPT | Performed by: NURSE PRACTITIONER

## 2024-04-16 PROCEDURE — 3074F SYST BP LT 130 MM HG: CPT | Performed by: NURSE PRACTITIONER

## 2024-04-16 PROCEDURE — 1123F ACP DISCUSS/DSCN MKR DOCD: CPT | Performed by: NURSE PRACTITIONER

## 2024-04-16 PROCEDURE — 99214 OFFICE O/P EST MOD 30 MIN: CPT | Performed by: NURSE PRACTITIONER

## 2024-04-16 PROCEDURE — G8400 PT W/DXA NO RESULTS DOC: HCPCS | Performed by: NURSE PRACTITIONER

## 2024-04-16 PROCEDURE — G8427 DOCREV CUR MEDS BY ELIG CLIN: HCPCS | Performed by: NURSE PRACTITIONER

## 2024-04-16 PROCEDURE — G8420 CALC BMI NORM PARAMETERS: HCPCS | Performed by: NURSE PRACTITIONER

## 2024-04-16 RX ORDER — LISINOPRIL 2.5 MG/1
2.5 TABLET ORAL DAILY
Qty: 30 TABLET | Refills: 2 | Status: SHIPPED | OUTPATIENT
Start: 2024-04-16

## 2024-04-16 RX ORDER — SPIRONOLACTONE 25 MG/1
12.5 TABLET ORAL DAILY
Qty: 90 TABLET | Refills: 0 | Status: SHIPPED | OUTPATIENT
Start: 2024-04-16

## 2024-04-16 RX ORDER — FUROSEMIDE 20 MG/1
20 TABLET ORAL DAILY PRN
Qty: 30 TABLET | Refills: 2 | Status: SHIPPED | OUTPATIENT
Start: 2024-04-16

## 2024-04-16 ASSESSMENT — PATIENT HEALTH QUESTIONNAIRE - PHQ9
2. FEELING DOWN, DEPRESSED OR HOPELESS: NOT AT ALL
SUM OF ALL RESPONSES TO PHQ QUESTIONS 1-9: 0
SUM OF ALL RESPONSES TO PHQ QUESTIONS 1-9: 0
1. LITTLE INTEREST OR PLEASURE IN DOING THINGS: NOT AT ALL
SUM OF ALL RESPONSES TO PHQ9 QUESTIONS 1 & 2: 0
SUM OF ALL RESPONSES TO PHQ QUESTIONS 1-9: 0
SUM OF ALL RESPONSES TO PHQ QUESTIONS 1-9: 0

## 2024-04-16 NOTE — PATIENT INSTRUCTIONS
Medication changes:    No medication changes     Please take this to your pharmacy to notify them of the change in medications.     Testing Ordered:        Other Recommendations:      Please record blood pressure and heart rate daily before medication and two hours after medication, please record weight daily upon waking/after using the bathroom. Keep a written records of your weights and blood pressure and bring to your next appointment. If you have a weight gain of 3 or more pounds overnight OR 5 or more pounds in one week, new/worsened shortness of breath or swelling, or if your blood pressure begins to consistently run below 90/60 and/or you begin to experience dizziness or lightheadedness please contact our office at [phone] option 2.    Ensure your drinking an adequate amount of water with a goal of 6-8 eight ounce glasses (1.5-2 liters) of fluid daily. Your urine should be clear and light yellow straw colored.     Follow up with Dr. Morel and only as needed with [facility]    Thank you for allowing us the privilege of being a part of your healthcare team! Please do not hesitate to contact our office at [phone] option 2 with any questions or concerns.     We are restarting a monthly heart failure support group, this will be the last Wednesday of every month from 5-6pm at [facility]. If you would like to attend you will need to RSVP to [email or web address]

## 2024-04-22 ENCOUNTER — TELEPHONE (OUTPATIENT)
Age: 79
End: 2024-04-22

## 2024-04-22 NOTE — TELEPHONE ENCOUNTER
Enrolled with [employer] - Ordered and being shipped to patient's home address on file.  ETA within 5-7 Business Days.        Message  Received: Today  Prakash Frost, RN  Lacey Saldivar; Tricia Walker  PLEASE SEND 14 DAY HOLTER FOR AVNRT PER DR. ABDI.  THANK YOU.

## 2024-04-30 ENCOUNTER — OFFICE VISIT (OUTPATIENT)
Age: 79
End: 2024-04-30
Payer: MEDICARE

## 2024-04-30 VITALS
BODY MASS INDEX: 23.19 KG/M2 | HEART RATE: 69 BPM | WEIGHT: 126 LBS | HEIGHT: 62 IN | OXYGEN SATURATION: 100 % | SYSTOLIC BLOOD PRESSURE: 96 MMHG | DIASTOLIC BLOOD PRESSURE: 62 MMHG

## 2024-04-30 DIAGNOSIS — I42.0 DILATED CARDIOMYOPATHY (HCC): ICD-10-CM

## 2024-04-30 DIAGNOSIS — I47.19 AVNRT (AV NODAL RE-ENTRY TACHYCARDIA) (HCC): Primary | ICD-10-CM

## 2024-04-30 DIAGNOSIS — I10 ESSENTIAL (PRIMARY) HYPERTENSION: ICD-10-CM

## 2024-04-30 DIAGNOSIS — I50.22 CHRONIC SYSTOLIC CONGESTIVE HEART FAILURE (HCC): ICD-10-CM

## 2024-04-30 PROCEDURE — G8427 DOCREV CUR MEDS BY ELIG CLIN: HCPCS | Performed by: SPECIALIST

## 2024-04-30 PROCEDURE — 99214 OFFICE O/P EST MOD 30 MIN: CPT | Performed by: SPECIALIST

## 2024-04-30 PROCEDURE — 3074F SYST BP LT 130 MM HG: CPT | Performed by: SPECIALIST

## 2024-04-30 PROCEDURE — 1036F TOBACCO NON-USER: CPT | Performed by: SPECIALIST

## 2024-04-30 PROCEDURE — 1123F ACP DISCUSS/DSCN MKR DOCD: CPT | Performed by: SPECIALIST

## 2024-04-30 PROCEDURE — G8400 PT W/DXA NO RESULTS DOC: HCPCS | Performed by: SPECIALIST

## 2024-04-30 PROCEDURE — 1090F PRES/ABSN URINE INCON ASSESS: CPT | Performed by: SPECIALIST

## 2024-04-30 PROCEDURE — 93005 ELECTROCARDIOGRAM TRACING: CPT | Performed by: SPECIALIST

## 2024-04-30 PROCEDURE — G8420 CALC BMI NORM PARAMETERS: HCPCS | Performed by: SPECIALIST

## 2024-04-30 PROCEDURE — 3078F DIAST BP <80 MM HG: CPT | Performed by: SPECIALIST

## 2024-04-30 PROCEDURE — 93010 ELECTROCARDIOGRAM REPORT: CPT | Performed by: SPECIALIST

## 2024-04-30 RX ORDER — ASPIRIN 81 MG/1
81 TABLET, CHEWABLE ORAL
Qty: 30 TABLET | Refills: 3
Start: 2024-05-01

## 2024-04-30 ASSESSMENT — PATIENT HEALTH QUESTIONNAIRE - PHQ9
SUM OF ALL RESPONSES TO PHQ QUESTIONS 1-9: 0
1. LITTLE INTEREST OR PLEASURE IN DOING THINGS: NOT AT ALL
SUM OF ALL RESPONSES TO PHQ QUESTIONS 1-9: 0
2. FEELING DOWN, DEPRESSED OR HOPELESS: NOT AT ALL
SUM OF ALL RESPONSES TO PHQ QUESTIONS 1-9: 0
SUM OF ALL RESPONSES TO PHQ9 QUESTIONS 1 & 2: 0
SUM OF ALL RESPONSES TO PHQ QUESTIONS 1-9: 0

## 2024-04-30 NOTE — PATIENT INSTRUCTIONS
This testing involves a cardiac tracer ordered specifically for you. If you are unable to make your appointment, please call to cancel/reschedule AT LEAST 24 hours prior to your appointment so your tracer can be cancelled. [phone].

## 2024-04-30 NOTE — PROGRESS NOTES
[facility]                                     DIVISION OF CARDIOLOGY                                                                             OFFICE NOTE                  KAILYN VAIL M.D. , GERRY            NITA ALONZO   1945  [id number]    Date/Time:  4/30/20248:45 AM      BP 96/62 (Site: Left Upper Arm, Position: Sitting, Cuff Size: Medium Adult)   Pulse 69   Ht 1.575 m (5' 2\")   Wt 57.2 kg (126 lb)   SpO2 100%   BMI 23.05 kg/m²        Wt Readings from Last 3 Encounters:   04/30/24 57.2 kg (126 lb)   04/16/24 57.6 kg (127 lb)   03/13/24 56.9 kg (125 lb 8 oz)          Lab Results   Component Value Date    CHOL 118 01/15/2024    TRIG 84 01/15/2024    HDL 26 01/15/2024    LDLCALC 75.2 01/15/2024    CHOLHDLRATIO 4.5 01/15/2024              SUBJECTIVE:  She is doing very well she is still anxious about everything but overall she has had no chest pain or shortness of breath palpitation presyncopal syncopal episodes.       Assessment/Plan  1.  Cardiomyopathy:     Her cardiomyopathy has resolved completely.  She does have a normal echocardiogram in March 2024 and also essentially normal cardiac MRI in March 2024 with a normal ejection fraction and no valvular dysfunction.  This has been discussed with the patient.    She does have some coronary disease but I do not think that was the cause of her cardiomyopathy.  Suspect most likely related to the stress cardiomyopathy or atrial fibrillation flutter with rapid ventricular response.     She seems to be well compensated at this time.     Continue with Toprol-XL 25 mg daily.  Now on lisinopril 2.5 mg daily as per advanced heart failure team.     Continue Jardiance and spironolactone.     Right heart catheterization January 2024 with normal filling pressures and low cardiac index.     Continue above medications for now and consider readjusting the next office visit     2.  Atrial flutter: Status post ablation off

## 2024-05-06 ENCOUNTER — TELEPHONE (OUTPATIENT)
Age: 79
End: 2024-05-06

## 2024-05-06 NOTE — TELEPHONE ENCOUNTER
Patient states she Dr phipps wanted her to see Sleep Medicine  but she can't get through to them. She wants to see if Dr or nurse can call them to schedule the appt, they can be reached at [phone]    Phone [phone]

## 2024-05-07 ENCOUNTER — TELEPHONE (OUTPATIENT)
Age: 79
End: 2024-05-07

## 2024-05-07 NOTE — TELEPHONE ENCOUNTER
TC to sleep clinic, pt ID verified. States pt has appt for August. She had earlier appointments that were rescheduled by pt. He states he will reach out to the pt to see if she has any questions.

## 2024-05-07 NOTE — TELEPHONE ENCOUNTER
Received call from [facility] that the patient had contacted their office requesting that they contact [facility] to schedule appointment. Patient previously scheduled on 2/2/2024 (rescheduled), 3/26/2024 (patient cancel > 24 hrs), and 5/22/2024 (patient cancel > 24 hrs). Patient is currently scheduled for appointment on 8/2/2024. I attempted to contact patient to discuss if there are any questions with appointment. LVM with appointment details and instructing patient to call [facility] if there are any additional questions.

## 2024-05-31 ENCOUNTER — TELEPHONE (OUTPATIENT)
Age: 79
End: 2024-05-31

## 2024-05-31 NOTE — TELEPHONE ENCOUNTER
Patients daughter Haylie would like to know if the pt can be seen sooner than 7/15 for test results. Please call her at [phone]

## 2024-06-01 NOTE — PROGRESS NOTES
CAPS capsule 1 tablet Orally Once a day      folic acid (FOLVITE) 1 MG tablet Take 1 tablet by mouth daily      levothyroxine (SYNTHROID) 75 MCG tablet 1 tablet in the morning on an empty stomach Orally Once a day for 30 day(s)      metoprolol succinate (TOPROL XL) 25 MG extended release tablet TAKE 1 TABLET BY MOUTH EVERY DAY FOR 90 DAYS       No current facility-administered medications for this visit.          HILARIO Martinez - NP  [facility]  [address] [place] [address] [address]  [place], [place] [address] [phone]      CC:Heidy Perez MD

## 2024-06-03 NOTE — TELEPHONE ENCOUNTER
Returned call to the patient's daughter. Informed that it takes 2 weeks to score testing so I am unable to schedule the follow up appt sooner than the scheduled date. She expressed understanding.

## 2024-06-04 ENCOUNTER — HOSPITAL ENCOUNTER (OUTPATIENT)
Facility: HOSPITAL | Age: 79
Discharge: HOME | End: 2024-06-07
Attending: INTERNAL MEDICINE
Payer: MEDICARE

## 2024-06-04 DIAGNOSIS — N17.9 ACUTE RENAL FAILURE, UNSPECIFIED ACUTE RENAL FAILURE TYPE (HCC): ICD-10-CM

## 2024-06-04 PROCEDURE — 76770 US EXAM ABDO BACK WALL COMP: CPT

## 2024-06-10 ENCOUNTER — OFFICE VISIT (OUTPATIENT)
Age: 79
End: 2024-06-10
Payer: MEDICARE

## 2024-06-10 VITALS
DIASTOLIC BLOOD PRESSURE: 70 MMHG | HEART RATE: 68 BPM | HEIGHT: 62 IN | OXYGEN SATURATION: 98 % | BODY MASS INDEX: 23.19 KG/M2 | WEIGHT: 126 LBS | RESPIRATION RATE: 16 BRPM | SYSTOLIC BLOOD PRESSURE: 110 MMHG

## 2024-06-10 DIAGNOSIS — E03.9 HYPOTHYROIDISM, UNSPECIFIED TYPE: ICD-10-CM

## 2024-06-10 DIAGNOSIS — I50.22 CHRONIC SYSTOLIC CHF (CONGESTIVE HEART FAILURE), NYHA CLASS 2 (HCC): ICD-10-CM

## 2024-06-10 DIAGNOSIS — I42.8 NICM (NONISCHEMIC CARDIOMYOPATHY) (HCC): ICD-10-CM

## 2024-06-10 DIAGNOSIS — Z79.01 ANTICOAGULATED: ICD-10-CM

## 2024-06-10 DIAGNOSIS — I48.3 TYPICAL ATRIAL FLUTTER (HCC): ICD-10-CM

## 2024-06-10 DIAGNOSIS — I10 PRIMARY HYPERTENSION: ICD-10-CM

## 2024-06-10 DIAGNOSIS — I47.19 AVNRT (AV NODAL RE-ENTRY TACHYCARDIA) (HCC): Primary | ICD-10-CM

## 2024-06-10 PROCEDURE — G8400 PT W/DXA NO RESULTS DOC: HCPCS | Performed by: NURSE PRACTITIONER

## 2024-06-10 PROCEDURE — 1123F ACP DISCUSS/DSCN MKR DOCD: CPT | Performed by: NURSE PRACTITIONER

## 2024-06-10 PROCEDURE — 99214 OFFICE O/P EST MOD 30 MIN: CPT | Performed by: NURSE PRACTITIONER

## 2024-06-10 PROCEDURE — 3074F SYST BP LT 130 MM HG: CPT | Performed by: NURSE PRACTITIONER

## 2024-06-10 PROCEDURE — 1090F PRES/ABSN URINE INCON ASSESS: CPT | Performed by: NURSE PRACTITIONER

## 2024-06-10 PROCEDURE — G8420 CALC BMI NORM PARAMETERS: HCPCS | Performed by: NURSE PRACTITIONER

## 2024-06-10 PROCEDURE — 1036F TOBACCO NON-USER: CPT | Performed by: NURSE PRACTITIONER

## 2024-06-10 PROCEDURE — G8427 DOCREV CUR MEDS BY ELIG CLIN: HCPCS | Performed by: NURSE PRACTITIONER

## 2024-06-10 PROCEDURE — 3078F DIAST BP <80 MM HG: CPT | Performed by: NURSE PRACTITIONER

## 2024-06-11 ENCOUNTER — ANCILLARY PROCEDURE (OUTPATIENT)
Age: 79
End: 2024-06-11
Payer: MEDICARE

## 2024-06-11 PROCEDURE — 93246 EXT ECG>7D<15D RECORDING: CPT | Performed by: INTERNAL MEDICINE

## 2024-06-24 ENCOUNTER — TELEPHONE (OUTPATIENT)
Age: 79
End: 2024-06-24

## 2024-06-24 NOTE — TELEPHONE ENCOUNTER
Returned call to the patient's daughter. Advised she discuss her concerns with Dr Parker at the initial evaluation on 6/27/2024. She expressed understanding and thanked me for calling.

## 2024-06-24 NOTE — TELEPHONE ENCOUNTER
Patients daughter Haylie would like to know if Dr. Parker would review pts CT scan from January 15 in order to assist with pts diagnosis. Her call back number is [phone]

## 2024-06-27 ENCOUNTER — OFFICE VISIT (OUTPATIENT)
Age: 79
End: 2024-06-27
Payer: MEDICARE

## 2024-06-27 DIAGNOSIS — F43.22 ADJUSTMENT DISORDER WITH ANXIOUS MOOD: Primary | ICD-10-CM

## 2024-06-27 DIAGNOSIS — R41.3 COMPLAINTS OF MEMORY DISTURBANCE: ICD-10-CM

## 2024-06-27 DIAGNOSIS — I50.32 HEART FAILURE WITH IMPROVED EJECTION FRACTION (HFIMPEF) (HCC): ICD-10-CM

## 2024-06-27 PROCEDURE — 90791 PSYCH DIAGNOSTIC EVALUATION: CPT | Performed by: CLINICAL NEUROPSYCHOLOGIST

## 2024-06-27 PROCEDURE — 1123F ACP DISCUSS/DSCN MKR DOCD: CPT | Performed by: CLINICAL NEUROPSYCHOLOGIST

## 2024-06-27 PROCEDURE — 1036F TOBACCO NON-USER: CPT | Performed by: CLINICAL NEUROPSYCHOLOGIST

## 2024-06-27 RX ORDER — LISINOPRIL 2.5 MG/1
2.5 TABLET ORAL DAILY
Qty: 90 TABLET | Refills: 1 | Status: SHIPPED | OUTPATIENT
Start: 2024-06-27

## 2024-06-27 RX ORDER — FUROSEMIDE 20 MG/1
20 TABLET ORAL DAILY PRN
Qty: 90 TABLET | Refills: 0 | Status: SHIPPED | OUTPATIENT
Start: 2024-06-27

## 2024-06-27 NOTE — TELEPHONE ENCOUNTER
Requested Prescriptions     Signed Prescriptions Disp Refills    furosemide (LASIX) 20 MG tablet 90 tablet 0     Sig: Take 1 tablet by mouth daily as needed (Take 1 tablet daily AS NEEDED for weight gain of 3 lbs or more in 24 hours or 5 lbs in 1 week or SOB/swelling)     Authorizing Provider: ABRAM MCGUIRE     Ordering User: CATIA BARKER    lisinopril (PRINIVIL;ZESTRIL) 2.5 MG tablet 90 tablet 1     Sig: Take 1 tablet by mouth daily     Authorizing Provider: ABRAM MCGUIRE     Ordering User: CATIA BARKER     Verbal order per Dr. Mcguire.    Future Appointments   Date Time Provider Department Center   6/27/2024  2:00 PM Derick Parker PSYD [facility] BS AMB   6/28/2024 12:30 PM NEUROPSYCH TESTING [facility] BS AMB   7/19/2024  3:30 PM Derick Parker PSYD [facility] AMB   8/2/2024 10:00 AM Joan Woodruff MD [facility] BS AMB   8/12/2024 10:40 AM Selma Zuniga DO [facility] BS AMB   11/4/2024  8:30 AM BS ART [facility] [place] BS AMB   11/11/2024  9:00 AM Abram Mcguire MD [facility] AMB   3/25/2025  9:00 AM Rosalina Mendes MD [facility] AMB

## 2024-06-27 NOTE — PROGRESS NOTES
Intake Note      Patient Name: Frannie Blake  YOB: 1945    Age: 79 y.o.  Date of Intake: 6/27/2024   Education: 18 Ethnicity White   Gender: Female Referring Provider: Dr. Zuniga     REASON FOR REFERRAL AND EVALUATION PROCEDURES:  Frannie Blake  was referred for evaluation by her neurologist to assist in differential diagnosis and individualized treatment planning. she understood the rationale and procedures for evaluation, as well as the limits to confidentiality, and agreed to participate. she consented to have this report made available to her  treating providers through her  electronic medical records.   History Sources: Patient, Relative (daughter), and Medical Record    HISTORY OF PRESENT ILLNESS:  The patient is a 79-year-old female with pertinent medical history noted for acquired hypothyroidism, essential hypertension, chronic systolic congestive heart failure (LVEF 2/29/2024: 60-65%), and dilated cardiomyopathy.  She presented for clinical interview accompanied by her daughter.  While the patient appeared to be an adequate historian with appropriate insight, her daughter assisted with providing additional pertinent details.  According to the patient's daughter, the patient's family started noticing subtle changes in the patient's cognitive functioning last year.  These changes were described to include slight short-term episodic memory weaknesses.  The patient saw Dr. Zuniga in August of last year and completed a MoCA with a score of 26/30.  Her cognitive weaknesses were acutely exacerbated in January of this year when she was hospitalized for acute on chronic combined systolic and diastolic heart failure.  LVEF at that time was 15 to 20%.  The patient's daughter reported that during her hospitalization and shortly thereafter, the patient appeared \"very confused\" and she repeated questions, needing reassurance about information.  The short-term memory problem improved but did

## 2024-06-28 ENCOUNTER — PROCEDURE VISIT (OUTPATIENT)
Age: 79
End: 2024-06-28
Payer: MEDICARE

## 2024-06-28 DIAGNOSIS — R41.9 NEUROCOGNITIVE DISORDER: Primary | ICD-10-CM

## 2024-06-28 DIAGNOSIS — F43.22 ADJUSTMENT DISORDER WITH ANXIOUS MOOD: ICD-10-CM

## 2024-06-28 PROCEDURE — 96132 NRPSYC TST EVAL PHYS/QHP 1ST: CPT | Performed by: CLINICAL NEUROPSYCHOLOGIST

## 2024-06-28 PROCEDURE — 96133 NRPSYC TST EVAL PHYS/QHP EA: CPT | Performed by: CLINICAL NEUROPSYCHOLOGIST

## 2024-06-28 PROCEDURE — 96139 PSYCL/NRPSYC TST TECH EA: CPT | Performed by: CLINICAL NEUROPSYCHOLOGIST

## 2024-06-28 PROCEDURE — 96138 PSYCL/NRPSYC TECH 1ST: CPT | Performed by: CLINICAL NEUROPSYCHOLOGIST

## 2024-06-28 RX ORDER — ASPIRIN 81 MG/1
81 TABLET, CHEWABLE ORAL DAILY
Qty: 90 TABLET | Refills: 1 | OUTPATIENT
Start: 2024-06-28

## 2024-07-01 NOTE — PROGRESS NOTES
judgment: Average  Psychiatric/Sleep:   Depression: Within normal limits  Anxiety: Mild  Daytime sleepiness: Within normal limits  Sleep quality: Within normal limits    TIME:  Clinical Interview: 1400 - 1430 = 30 minutes  Testing by technician: 1230 - 1505 = 155 minutes  Scoring by technician: 50 minutes  Neuropsychological testing evaluation services*: 162 minutes    BILLING:  90791 x 1 Unit  96138 x 1 Unit  96139 x 6 Units  96132 x 1 Unit  96133 x 2 Units    *Neuropsychological testing evaluation services include: Integration of patient data, interpretation of standardized test results and clinical data, clinical decision-making, treatment planning and report, and interactive feedback to the patient, family member(s) or caregiver(s), when performed.   Yes

## 2024-07-17 DIAGNOSIS — I50.32 HEART FAILURE WITH IMPROVED EJECTION FRACTION (HFIMPEF) (HCC): ICD-10-CM

## 2024-07-17 RX ORDER — FUROSEMIDE 20 MG/1
20 TABLET ORAL DAILY PRN
Qty: 90 TABLET | Refills: 0 | Status: SHIPPED | OUTPATIENT
Start: 2024-07-17

## 2024-07-17 NOTE — TELEPHONE ENCOUNTER
Requested Prescriptions     Signed Prescriptions Disp Refills    furosemide (LASIX) 20 MG tablet 90 tablet 0     Sig: TAKE 1 TABLET BY MOUTH DAILY AS NEEDED (TAKE 1 TABLET DAILY AS NEEDED FOR WEIGHT GAIN OF 3 LBS OR MORE IN 24 HOURS OR 5 LBS IN 1 WEEK OR SOB/SWELLING)     Authorizing Provider: ABRAM MCGUIRE     Ordering User: CATIA BARKER     Verbal order per Dr. Mcguire.    Future Appointments   Date Time Provider Department Center   8/2/2024 10:00 AM Joan Woodruff MD [facility] BS AMB   8/12/2024 10:40 AM Selma Zuniga DO [facility] BS AMB   11/4/2024  8:30 AM [facility] ART [place] [address] [place] BS AMB   11/11/2024  9:00 AM Abram Mcguire MD [facility] BS AMB   3/25/2025  9:00 AM Rosalina Mendes MD [facility] BS AMB

## 2024-07-29 ENCOUNTER — PATIENT MESSAGE (OUTPATIENT)
Age: 79
End: 2024-07-29

## 2024-07-29 RX ORDER — ASPIRIN 81 MG/1
81 TABLET, CHEWABLE ORAL DAILY
Qty: 90 TABLET | Refills: 3
Start: 2024-07-29

## 2024-08-02 ENCOUNTER — TELEPHONE (OUTPATIENT)
Age: 79
End: 2024-08-02

## 2024-08-07 ENCOUNTER — TELEPHONE (OUTPATIENT)
Age: 79
End: 2024-08-07

## 2024-08-07 NOTE — TELEPHONE ENCOUNTER
Patient's daughter,Haylie, asking if it is possible for her to participate in her mother's upcoming appt. via Zoom or speaker phone? [place], is unable be here in person.    Pls call Haylie Welch   [phone]

## 2024-08-07 NOTE — TELEPHONE ENCOUNTER
Called and spoke with the Pt's daughter on file to speak with.  She would like to be on the speaker phone with mom during her appt.

## 2024-08-12 ENCOUNTER — OFFICE VISIT (OUTPATIENT)
Age: 79
End: 2024-08-12
Payer: MEDICARE

## 2024-08-12 ENCOUNTER — TELEPHONE (OUTPATIENT)
Age: 79
End: 2024-08-12

## 2024-08-12 VITALS
OXYGEN SATURATION: 99 % | BODY MASS INDEX: 22.5 KG/M2 | DIASTOLIC BLOOD PRESSURE: 80 MMHG | SYSTOLIC BLOOD PRESSURE: 142 MMHG | HEART RATE: 68 BPM | HEIGHT: 63 IN | WEIGHT: 127 LBS

## 2024-08-12 DIAGNOSIS — R41.9 NEUROCOGNITIVE DISORDER: ICD-10-CM

## 2024-08-12 DIAGNOSIS — F43.22 ADJUSTMENT DISORDER WITH ANXIOUS MOOD: Primary | ICD-10-CM

## 2024-08-12 PROCEDURE — 1036F TOBACCO NON-USER: CPT | Performed by: PSYCHIATRY & NEUROLOGY

## 2024-08-12 PROCEDURE — 3077F SYST BP >= 140 MM HG: CPT | Performed by: PSYCHIATRY & NEUROLOGY

## 2024-08-12 PROCEDURE — 1123F ACP DISCUSS/DSCN MKR DOCD: CPT | Performed by: PSYCHIATRY & NEUROLOGY

## 2024-08-12 PROCEDURE — 1090F PRES/ABSN URINE INCON ASSESS: CPT | Performed by: PSYCHIATRY & NEUROLOGY

## 2024-08-12 PROCEDURE — 3079F DIAST BP 80-89 MM HG: CPT | Performed by: PSYCHIATRY & NEUROLOGY

## 2024-08-12 PROCEDURE — 96132 NRPSYC TST EVAL PHYS/QHP 1ST: CPT | Performed by: PSYCHIATRY & NEUROLOGY

## 2024-08-12 PROCEDURE — 96138 PSYCL/NRPSYC TECH 1ST: CPT | Performed by: PSYCHIATRY & NEUROLOGY

## 2024-08-12 PROCEDURE — G8420 CALC BMI NORM PARAMETERS: HCPCS | Performed by: PSYCHIATRY & NEUROLOGY

## 2024-08-12 PROCEDURE — G8400 PT W/DXA NO RESULTS DOC: HCPCS | Performed by: PSYCHIATRY & NEUROLOGY

## 2024-08-12 PROCEDURE — G8427 DOCREV CUR MEDS BY ELIG CLIN: HCPCS | Performed by: PSYCHIATRY & NEUROLOGY

## 2024-08-12 PROCEDURE — 99213 OFFICE O/P EST LOW 20 MIN: CPT | Performed by: PSYCHIATRY & NEUROLOGY

## 2024-08-12 NOTE — PROGRESS NOTES
(CHOLECALCIFEROL) 125 MCG (5000 UT) CAPS capsule 1 tablet Orally Once a day      folic acid (FOLVITE) 1 MG tablet Take 1 tablet by mouth daily      levothyroxine (SYNTHROID) 75 MCG tablet 1 tablet in the morning on an empty stomach Orally Once a day for 30 day(s)      metoprolol succinate (TOPROL XL) 25 MG extended release tablet TAKE 1 TABLET BY MOUTH EVERY DAY FOR 90 DAYS       No current facility-administered medications for this visit.       Exam:  BP (!) 142/80   Pulse 68   Ht 1.588 m (5' 2.5\")   Wt 57.6 kg (127 lb)   SpO2 99%   BMI 22.86 kg/m²   NEUROLOGICAL EXAM:  General: Awake, alert, speech fluent  CN: PERRL, EOMI without nystagmus, VFF to confrontation, facial sensation and strength are normal and symmetric, hearing is intact to finger rub bilaterally, palate and tongue movements are intact and symmetric.  Motor: Normal tone, bulk and strength bilaterally.  Reflexes: 2/4 and symmetric, plantar stimulation is flexor.  Coordination: FNF, EUGENIO, HTS intact.  Sensation: LT intact throughout.  Gait: Normal-based and steady.          Cognitive Testing Evaluation      Introduction:      Frannie Blake  1945  Female   This 79 year old Female was administered a battery of neurocognitive testing on 08/12/2024.      Tests Administered:      Trails A, Trails B, Digit Symbol Substitution, Stroop, Immediate Recognition, Delayed Recognition   The combined test administration time was 11 minutes   Test Results:   Cognitive testing was provided via a battery of cognitive assessments. The pattern of test scores indicate that results are valid.  A Clinical Report with further description of scores and results is also available.   Overall: Patient tested in the 13th percentile (standard score of 83).   Trails A: Patient tested in the 43rd percentile (scaled standard score of 97).  Trails B: Patient tested in the 77th percentile (scaled standard score of 111).  Digit Symbol Substitution: Patient tested in the 1st

## 2024-08-12 NOTE — PROGRESS NOTES
performance was in the 13th percentile when compared to individuals of a similar age and gender, suggesting possible presence of cognitive impairment.

## 2024-08-12 NOTE — TELEPHONE ENCOUNTER
Patient's daughter has 2 questions re: patient summary    Why should her mother discontinue Fosamax?  Performance @ 13 %, is that good or bad?      Pls call Haylie   [phone]

## 2024-08-14 NOTE — TELEPHONE ENCOUNTER
Patient's daughter requesting medication alendronate (FOSAMAX) 70 MG tablet be added back to medication list so it is visible on mychart.

## 2024-08-14 NOTE — TELEPHONE ENCOUNTER
The [place] screening score was 83 and borderline for mild cognitive impairment, although again her more detailed neuropsychologic testing recently performed suggested anxiety is likely playing a part in this. As we discussed, we may repeat testing in 1 year if she is noting any worsening deficits. Advise PCP f/u for anxiety. I did not discontinue any of her medications-if this was removed, it must have been marked on med rec as not taking.  RMD       Returned the call to the Daughter. Explained the above information. Per request faxed office visit notes to the PCP received confirmation it was sent successfully.     She also wants it noted her mom seems to be forgetting her daughters birth dates etc which is unusual.

## 2024-08-30 DIAGNOSIS — I50.32 HEART FAILURE WITH IMPROVED EJECTION FRACTION (HFIMPEF) (HCC): ICD-10-CM

## 2024-08-30 RX ORDER — SPIRONOLACTONE 25 MG/1
12.5 TABLET ORAL DAILY
Qty: 45 TABLET | Refills: 1 | Status: SHIPPED | OUTPATIENT
Start: 2024-08-30

## 2024-08-30 NOTE — TELEPHONE ENCOUNTER
Cardiologist: Dr. Mcguire    Future Appointments   Date Time Provider Department Center   11/4/2024  8:30 AM [facility] ART [place] [address] [place]  AMB   11/11/2024  9:00 AM Abram Mcguire MD [facility] BS AMB   12/5/2024 11:00 AM Joan Woodruff MD [facility] BS AMB   3/25/2025  9:00 AM Rosalina Mendes MD [facility] BS AMB       Requested Prescriptions     Signed Prescriptions Disp Refills    empagliflozin (JARDIANCE) 10 MG tablet 90 tablet 1     Sig: Take 1 tablet by mouth daily     Authorizing Provider: ABRAM MCGUIRE     Ordering User: JOZEF HOOPER    spironolactone (ALDACTONE) 25 MG tablet 45 tablet 1     Sig: Take 0.5 tablets by mouth daily     Authorizing Provider: ABRAM MCGUIRE     Ordering User: JOZEF HOOPER         Refills VO per Dr. Mcguire.

## 2024-09-17 ENCOUNTER — TELEPHONE (OUTPATIENT)
Age: 79
End: 2024-09-17

## 2024-09-24 ENCOUNTER — PATIENT MESSAGE (OUTPATIENT)
Age: 79
End: 2024-09-24

## 2024-10-21 ENCOUNTER — TELEPHONE (OUTPATIENT)
Age: 79
End: 2024-10-21

## 2024-10-21 NOTE — TELEPHONE ENCOUNTER
LVM for Pt. To call and R/S.... See Below.      Per Jeana, (  She is technically a one day study, so she needs to be r/s to a one day slot. could you contact her and r/s her to another day? there is availability thurs and Friday week of nov 4. thanks!! )    Please reschedule

## 2024-10-27 DIAGNOSIS — I50.32 HEART FAILURE WITH IMPROVED EJECTION FRACTION (HFIMPEF) (HCC): ICD-10-CM

## 2024-10-28 RX ORDER — LISINOPRIL 2.5 MG/1
2.5 TABLET ORAL DAILY
Qty: 90 TABLET | Refills: 0 | Status: SHIPPED | OUTPATIENT
Start: 2024-10-28

## 2024-10-28 NOTE — TELEPHONE ENCOUNTER
Cardiologist: Dr. Espinoza    Future Appointments   Date Time Provider Department Center   11/11/2024  9:00 AM Abram Espinoza MD [facility]   11/11/2024  2:00 PM [address]   12/5/2024 11:00 AM Joan Woodruff MD [facility]   3/25/2025  9:00 AM Rosalina Mendes MD [facility]       Requested Prescriptions     Signed Prescriptions Disp Refills    lisinopril (PRINIVIL;ZESTRIL) 2.5 MG tablet 90 tablet 0     Sig: TAKE 1 TABLET BY MOUTH EVERY DAY     Authorizing Provider: ABRAM ESPINOZA     Ordering User: JOZEF HOOPER         Refills VO per Dr. Espinoza.

## 2024-11-01 ENCOUNTER — TELEPHONE (OUTPATIENT)
Age: 79
End: 2024-11-01

## 2024-11-01 NOTE — TELEPHONE ENCOUNTER
Patient daughter Haylie called to ask for more details on the stress test her mother is having. Will she get an injection etc? I did not go into details of the order. She's more upset because the test had to be rescheduled from 11/04.    Haylie # [phone]

## 2024-11-01 NOTE — TELEPHONE ENCOUNTER
Spoke to patient's daughter, Haylie.  Name noted on permission to release information. Identifiers x 2. Call disconnected.  Returned call to patient. Discussed that nuclear stress test postponed until December due to nationwide isotope shortage. Verbalized understanding.   Will need to reschedule follow up with MD.  Daughter had been planning to travel for follow up and would like to see if any earlier date becomes available. So that she can keep the 11-11-24.

## 2024-11-04 ENCOUNTER — TELEPHONE (OUTPATIENT)
Age: 79
End: 2024-11-04

## 2024-11-04 NOTE — TELEPHONE ENCOUNTER
Patient contacted and ID Verified by two Identifiers.     Advised patient that her upcoming Nuc scheduled on 12/5 is currently the 1st available Nuc( currently in our office).  Rescheduled 11/11 appt with Dr. Mcguire to 12/16; following her 12/5 testing.    Patient verbalized all understanding and had no additional questions.     Future Appointments   Date Time Provider Department Center   11/11/2024  2:00 PM [facility] 2 [facility]   12/5/2024  9:00 AM [facility] NUCLEAR 1 [place] BS AMB   12/5/2024 11:00 AM Joan Woodruff MD [facility] BS AMB   12/16/2024  3:00 PM Abram Mcguire MD [facility] BS AMB   3/25/2025  9:00 AM Rosalina Mendes MD [facility] BS AMB

## 2024-11-04 NOTE — TELEPHONE ENCOUNTER
Patient's daughter is calling to speak with the nurse Kiley about assisting with rescheduling her mother stress appointment so she can keep her appointment with the cardiologist.Please give a call back.    [phone] Haylie (daughter)

## 2024-11-05 ENCOUNTER — TELEPHONE (OUTPATIENT)
Age: 79
End: 2024-11-05

## 2024-11-05 NOTE — TELEPHONE ENCOUNTER
TC to pt's daughter (on HIPAA) ID verified. Advised there were no upcoming appts for nuclear testing at our office. Able to get her in at [facility] next week. Confirmed instructions. Will keep follow up as is. No further questions.    Future Appointments   Date Time Provider Department Center   11/12/2024  8:00 AM [facility] NUCLEAR 1 [facility] BS AMB   11/21/2024  9:30 AM [facility] 2 [facility]   12/5/2024 11:00 AM Joan Woodruff MD [facility] BS AMB   12/16/2024  3:00 PM [facility]   3/25/2025  9:00 AM Rosalina Mendes MD [facility] BS AMB

## 2024-11-12 ENCOUNTER — ANCILLARY PROCEDURE (OUTPATIENT)
Age: 79
End: 2024-11-12
Payer: MEDICARE

## 2024-11-12 VITALS
WEIGHT: 127 LBS | SYSTOLIC BLOOD PRESSURE: 140 MMHG | BODY MASS INDEX: 22.5 KG/M2 | DIASTOLIC BLOOD PRESSURE: 78 MMHG | HEIGHT: 63 IN | HEART RATE: 71 BPM

## 2024-11-12 DIAGNOSIS — I50.22 CHRONIC SYSTOLIC CONGESTIVE HEART FAILURE (HCC): ICD-10-CM

## 2024-11-12 DIAGNOSIS — I42.0 DILATED CARDIOMYOPATHY (HCC): ICD-10-CM

## 2024-11-12 DIAGNOSIS — I10 ESSENTIAL (PRIMARY) HYPERTENSION: ICD-10-CM

## 2024-11-12 PROCEDURE — 78452 HT MUSCLE IMAGE SPECT MULT: CPT | Performed by: SPECIALIST

## 2024-11-12 PROCEDURE — A9500 TC99M SESTAMIBI: HCPCS | Performed by: SPECIALIST

## 2024-11-12 RX ORDER — TETRAKIS(2-METHOXYISOBUTYLISOCYANIDE)COPPER(I) TETRAFLUOROBORATE 1 MG/ML
8.6 INJECTION, POWDER, LYOPHILIZED, FOR SOLUTION INTRAVENOUS
Status: COMPLETED | OUTPATIENT
Start: 2024-11-12 | End: 2024-11-12

## 2024-11-12 RX ORDER — TETRAKIS(2-METHOXYISOBUTYLISOCYANIDE)COPPER(I) TETRAFLUOROBORATE 1 MG/ML
23.7 INJECTION, POWDER, LYOPHILIZED, FOR SOLUTION INTRAVENOUS
Status: COMPLETED | OUTPATIENT
Start: 2024-11-12 | End: 2024-11-12

## 2024-11-12 RX ADMIN — TECHNETIUM TC-99M SESTAMIBI 8.6 MILLICURIE: 1 INJECTION INTRAVENOUS at 08:15

## 2024-11-12 RX ADMIN — TECHNETIUM TC-99M SESTAMIBI 23.7 MILLICURIE: 1 INJECTION INTRAVENOUS at 09:10

## 2024-11-14 LAB
ECHO BSA: 1.59 M2
NUC STRESS EJECTION FRACTION: 82 %
STRESS ANGINA INDEX: 0
STRESS BASELINE DIAS BP: 78 MMHG
STRESS BASELINE HR: 72 BPM
STRESS BASELINE SYS BP: 140 MMHG
STRESS ESTIMATED WORKLOAD: 7 METS
STRESS EXERCISE DUR MIN: 4 MIN
STRESS EXERCISE DUR SEC: 0 SEC
STRESS O2 SAT PEAK: 97 %
STRESS O2 SAT REST: 99 %
STRESS PEAK DIAS BP: 86 MMHG
STRESS PEAK SYS BP: 160 MMHG
STRESS PERCENT HR ACHIEVED: 92 %
STRESS POST PEAK HR: 131 BPM
STRESS RATE PRESSURE PRODUCT: NORMAL BPM*MMHG
STRESS TARGET HR: 142 BPM
TID: 1.02

## 2024-11-14 PROCEDURE — 78452 HT MUSCLE IMAGE SPECT MULT: CPT | Performed by: SPECIALIST

## 2024-11-14 PROCEDURE — PBSHW PBB SHADOW CHARGE: Performed by: SPECIALIST

## 2024-11-14 PROCEDURE — 93018 CV STRESS TEST I&R ONLY: CPT | Performed by: SPECIALIST

## 2024-11-14 PROCEDURE — 93016 CV STRESS TEST SUPVJ ONLY: CPT | Performed by: SPECIALIST

## 2024-11-21 ENCOUNTER — HOSPITAL ENCOUNTER (OUTPATIENT)
Facility: HOSPITAL | Age: 79
Discharge: HOME | End: 2024-11-21
Payer: MEDICARE

## 2024-11-21 VITALS — BODY MASS INDEX: 22.5 KG/M2 | WEIGHT: 127 LBS | HEIGHT: 63 IN

## 2024-11-21 DIAGNOSIS — Z12.31 VISIT FOR SCREENING MAMMOGRAM: ICD-10-CM

## 2024-11-21 PROCEDURE — 77067 SCR MAMMO BI INCL CAD: CPT

## 2024-12-04 ASSESSMENT — SLEEP AND FATIGUE QUESTIONNAIRES
DO YOU HAVE DIFFICULTY WATCHING A MOVIE OR VIDEO BECAUSE YOU BECOME SLEEPY OR TIRED: NO
DO YOU HAVE DIFFICULTY CONCENTRATING ON THE THINGS YOU DO BECAUSE YOU ARE SLEEPY OR TIRED: NO
DO YOU HAVE DIFFICULTY BEING AS ACTIVE AS YOU WANT TO BE IN THE MORNING BECAUSE YOU ARE SLEEPY OR TIRED: NO
HOW LIKELY ARE YOU TO NOD OFF OR FALL ASLEEP WHILE SITTING AND READING: SLIGHT CHANCE OF DOZING
HAS YOUR RELATIONSHIP WITH FAMILY, FRIENDS OR WORK COLLEAGUES BEEN AFFECTED BECAUSE YOU ARE SLEEPY OR TIRED: NO
HOW LIKELY ARE YOU TO NOD OFF OR FALL ASLEEP IN A CAR, WHILE STOPPED FOR A FEW MINUTES IN TRAFFIC: WOULD NEVER DOZE
WHAT TIME DO YOU USUALLY GO TO BED: 22:30
AVERAGE NUMBER OF SLEEP HOURS: 8
FOSQ SCORE: 20
HAS YOUR MOOD BEEN AFFECTED BECAUSE YOU ARE SLEEPY OR TIRED: NO
HOW LIKELY ARE YOU TO NOD OFF OR FALL ASLEEP WHILE SITTING AND READING: SLIGHT CHANCE OF DOZING
DO YOU TAKE NAPS: NO
HOW LIKELY ARE YOU TO NOD OFF OR FALL ASLEEP WHILE SITTING QUIETLY AFTER LUNCH WITHOUT ALCOHOL: SLIGHT CHANCE OF DOZING
DO YOU WORK SHIFTS: NO
HOW LIKELY ARE YOU TO NOD OFF OR FALL ASLEEP WHEN YOU ARE A PASSENGER IN A CAR FOR AN HOUR WITHOUT A BREAK: SLIGHT CHANCE OF DOZING
DO YOU HAVE DIFFICULTY VISITING YOUR FAMILY OR FRIENDS IN THEIR HOME BECAUSE YOU BECOME SLEEPY OR TIRED: NO
ARE YOU BOTHERED BY WAKING UP TOO EARLY AND NOT BEING ABLE TO GET BACK TO SLEEP: NO
HOW LIKELY ARE YOU TO NOD OFF OR FALL ASLEEP WHILE SITTING INACTIVE IN A PUBLIC PLACE: SLIGHT CHANCE OF DOZING
DO YOU GENERALLY HAVE DIFFICULTY REMEMBERING THINGS BECAUSE YOU ARE SLEEPY OR TIRED: NO
HOW LIKELY ARE YOU TO NOD OFF OR FALL ASLEEP WHILE LYING DOWN TO REST IN THE AFTERNOON WHEN CIRCUMSTANCES PERMIT: SLIGHT CHANCE OF DOZING
HOW LIKELY ARE YOU TO NOD OFF OR FALL ASLEEP IN A CAR, WHILE STOPPED FOR A FEW MINUTES IN TRAFFIC: WOULD NEVER DOZE
HOW LIKELY ARE YOU TO NOD OFF OR FALL ASLEEP WHILE SITTING QUIETLY AFTER LUNCH WITHOUT ALCOHOL: SLIGHT CHANCE OF DOZING
NUMBER OF TIMES YOU WAKE PER NIGHT: 1
HOW LIKELY ARE YOU TO NOD OFF OR FALL ASLEEP WHILE SITTING AND TALKING TO SOMEONE: WOULD NEVER DOZE
HOW LIKELY ARE YOU TO NOD OFF OR FALL ASLEEP WHILE SITTING AND TALKING TO SOMEONE: WOULD NEVER DOZE
HOW LIKELY ARE YOU TO NOD OFF OR FALL ASLEEP WHEN YOU ARE A PASSENGER IN A CAR FOR AN HOUR WITHOUT A BREAK: SLIGHT CHANCE OF DOZING
ESS TOTAL SCORE: 6
SELECT ANY OF THE FOLLOWING BEHAVIORS OBSERVED WHILE YOU ARE ASLEEP: NONE OF THE ABOVE
DO YOU GET TOO LITTLE SLEEP AT NIGHT: NO
HOW LIKELY ARE YOU TO NOD OFF OR FALL ASLEEP WHILE LYING DOWN TO REST IN THE AFTERNOON WHEN CIRCUMSTANCES PERMIT: SLIGHT CHANCE OF DOZING
HOW LIKELY ARE YOU TO NOD OFF OR FALL ASLEEP WHILE WATCHING TV: SLIGHT CHANCE OF DOZING
DO YOU HAVE DIFFICULTY OPERATING A MOTOR VEHICLE FOR SHORT DISTANCES (LESS THAN 100 MILES) BECAUSE YOU BECOME SLEEPY: NO
HOW LIKELY ARE YOU TO NOD OFF OR FALL ASLEEP WHILE WATCHING TV: SLIGHT CHANCE OF DOZING
DO YOU GET TOO LITTLE SLEEP AT NIGHT: NO
AVERAGE NUMBER OF SLEEP HOURS: 8
ARE YOU BOTHERED BY WAKING UP TOO EARLY AND NOT BEING ABLE TO GET BACK TO SLEEP: NO
DO YOU HAVE PROBLEMS WITH FREQUENT AWAKENINGS AT NIGHT: NO
DO YOU HAVE DIFFICULTY OPERATING A MOTOR VEHICLE FOR LONG DISTANCES (GREATER THAN 100 MILES) BECAUSE YOU BECOME SLEEPY: NO
DO YOU HAVE DIFFICULTY BEING AS ACTIVE AS YOU WANT TO BE IN THE EVENING BECAUSE YOU ARE SLEEPY OR TIRED: NO
HOW LIKELY ARE YOU TO NOD OFF OR FALL ASLEEP WHILE SITTING INACTIVE IN A PUBLIC PLACE: SLIGHT CHANCE OF DOZING

## 2024-12-05 ENCOUNTER — OFFICE VISIT (OUTPATIENT)
Age: 79
End: 2024-12-05
Payer: MEDICARE

## 2024-12-05 ENCOUNTER — PROCEDURE VISIT (OUTPATIENT)
Age: 79
End: 2024-12-05

## 2024-12-05 VITALS
WEIGHT: 138.1 LBS | HEART RATE: 79 BPM | TEMPERATURE: 98.3 F | SYSTOLIC BLOOD PRESSURE: 127 MMHG | DIASTOLIC BLOOD PRESSURE: 83 MMHG | BODY MASS INDEX: 24.47 KG/M2 | OXYGEN SATURATION: 99 % | HEIGHT: 63 IN

## 2024-12-05 DIAGNOSIS — G47.33 OSA (OBSTRUCTIVE SLEEP APNEA): Primary | ICD-10-CM

## 2024-12-05 DIAGNOSIS — E07.9 THYROID DISORDER: ICD-10-CM

## 2024-12-05 DIAGNOSIS — I10 PRIMARY HYPERTENSION: ICD-10-CM

## 2024-12-05 PROCEDURE — 3074F SYST BP LT 130 MM HG: CPT | Performed by: INTERNAL MEDICINE

## 2024-12-05 PROCEDURE — G8484 FLU IMMUNIZE NO ADMIN: HCPCS | Performed by: INTERNAL MEDICINE

## 2024-12-05 PROCEDURE — 1123F ACP DISCUSS/DSCN MKR DOCD: CPT | Performed by: INTERNAL MEDICINE

## 2024-12-05 PROCEDURE — G8400 PT W/DXA NO RESULTS DOC: HCPCS | Performed by: INTERNAL MEDICINE

## 2024-12-05 PROCEDURE — G8427 DOCREV CUR MEDS BY ELIG CLIN: HCPCS | Performed by: INTERNAL MEDICINE

## 2024-12-05 PROCEDURE — 3079F DIAST BP 80-89 MM HG: CPT | Performed by: INTERNAL MEDICINE

## 2024-12-05 PROCEDURE — 1159F MED LIST DOCD IN RCRD: CPT | Performed by: INTERNAL MEDICINE

## 2024-12-05 PROCEDURE — 99204 OFFICE O/P NEW MOD 45 MIN: CPT | Performed by: INTERNAL MEDICINE

## 2024-12-05 PROCEDURE — 1090F PRES/ABSN URINE INCON ASSESS: CPT | Performed by: INTERNAL MEDICINE

## 2024-12-05 PROCEDURE — G8420 CALC BMI NORM PARAMETERS: HCPCS | Performed by: INTERNAL MEDICINE

## 2024-12-05 PROCEDURE — 1036F TOBACCO NON-USER: CPT | Performed by: INTERNAL MEDICINE

## 2024-12-05 NOTE — PROGRESS NOTES
[address], [place] [address]  Tel.  [phone]    Fax. [phone]     [address] [place] [address]  Tel.  [phone]    Fax. [phone] [address] [place] [address]  Tel.  [phone]    Fax. [phone]       Frannie Blake is a 79 y.o. year old female referred by Abram Mcguire MD for evaluation of a sleep disorder.       ASSESSMENT/PLAN:     Diagnosis Orders   1. BRANDON (obstructive sleep apnea)  PAT - Home Sleep Test      2. Primary hypertension        3. Thyroid disorder        4. BMI 24.0-24.9, adult            Patient has a history and examination consistent with the diagnosis of sleep apnea.    Return for follow-up after testing is completed.    * The patient currently has a Low Risk for having sleep apnea.  STOP-BANG score 3.    * WatchPAT testing was ordered for initial evaluation per patient request.      Orders Placed This Encounter   Procedures    PAT - Home Sleep Test     Standing Status:   Future     Standing Expiration Date:   6/5/2025     Order Specific Question:   Location For Sleep Study     Answer:   [facility]       * She was provided information on sleep apnea including corresponding risk factors and the importance of proper treatment.     * Treatment options were reviewed in detail, she would like to proceed with OAT therapy. She understand that PAP therapy is the Gold Standard for treatment of BRANDON.    * The patient was counseled regarding proper sleep hygiene, with emphasis on ensuring sufficient total sleep time; safe driving and the benefits of exercise.    * All of her questions were addressed.    2. Hypertension -  continue on current regimen, she will continue to monitor her BP and follow up with her primary care provider for reevaluation/adjustment of medications if warranted.  I have reviewed the relationship between hypertension as it

## 2024-12-05 NOTE — PROGRESS NOTES
S>Frannie Blake is a 79 y.o. female seen today to receive a home sleep testing unit (WatchPAT).    Patient was educated on proper hookup and operation of the WatchPAT HST via detailed instruction sheet .  Instruction forms with after hours contact and documentation were signed.    O>    There were no vitals taken for this visit.      A>  No diagnosis found.      P>  General information regarding operations and maintenance of the device was provided.  Follow-up appointment was made to return the WatchPAT HST. She will be contacted once the results have been reviewed.  She was asked to contact our office for any problems regarding her home sleep test study.

## 2024-12-05 NOTE — PATIENT INSTRUCTIONS
[address]  [place] [address]  Tel.  [phone]  Fax. [phone] [address]  [place] [address]  Tel.  [phone]  Fax. [phone] [address] [address]  Tel.  [phone]  Fax. [phone]     Sleep Apnea: After Your Visit  Your Care Instructions  Sleep apnea occurs when you frequently stop breathing for 10 seconds or longer during sleep. It can be mild to severe, based on the number of times per hour that you stop breathing or have slowed breathing. Blocked or narrowed airways in your nose, mouth, or throat can cause sleep apnea. Your airway can become blocked when your throat muscles and tongue relax during sleep.  Sleep apnea is common, occurring in 1 out of 20 individuals.  Individuals having any of the following characteristics should be evaluated and treated right away due to high risk and detrimental consequences from untreated sleep apnea:  Obesity  Congestive Heart failure  Atrial Fibrillation  Uncontrolled Hypertension  Type II Diabetes  Night-time Arrhythmias  Stroke  Pulmonary Hypertension  High-risk Driving Populations (pilots, truck drivers, etc.)  Patients Considering Weight-loss Surgery    How do you know you have sleep apnea?  You probably have sleep apnea if you answer 'yes' to 3 or more of the following questions:  S - Have you been told that you Snore?   T - Are you often Tired during the day?  O - Has anyone Observed you stop breathing while sleeping?  P- Do you have (or are being treated for) high blood Pressure?    B - Are you obese (Body Mass Index > 35)?  A - Is your Age 50 years old or older?  N - Is your Neck size greater than 16 inches?  G - Are you male Gender?  A sleep physician can prescribe a breathing device that prevents tissues in the throat from blocking your airway. Or your doctor may recommend using a dental device (oral breathing device) to help keep your airway open. In some cases, surgery may

## 2024-12-06 ENCOUNTER — TELEPHONE (OUTPATIENT)
Age: 79
End: 2024-12-06

## 2024-12-06 NOTE — TELEPHONE ENCOUNTER
Patient came into office to drop off her WatchPat device. Patient stated she did not use the device last night and would not like to reschedule at this time to complete the study.

## 2024-12-16 ENCOUNTER — OFFICE VISIT (OUTPATIENT)
Age: 79
End: 2024-12-16
Payer: MEDICARE

## 2024-12-16 VITALS
BODY MASS INDEX: 24.1 KG/M2 | HEART RATE: 67 BPM | OXYGEN SATURATION: 95 % | DIASTOLIC BLOOD PRESSURE: 70 MMHG | WEIGHT: 136 LBS | SYSTOLIC BLOOD PRESSURE: 116 MMHG | HEIGHT: 63 IN

## 2024-12-16 DIAGNOSIS — E78.49 OTHER HYPERLIPIDEMIA: ICD-10-CM

## 2024-12-16 DIAGNOSIS — I42.8 NICM (NONISCHEMIC CARDIOMYOPATHY) (HCC): ICD-10-CM

## 2024-12-16 DIAGNOSIS — I10 ESSENTIAL (PRIMARY) HYPERTENSION: ICD-10-CM

## 2024-12-16 DIAGNOSIS — I42.0 DILATED CARDIOMYOPATHY (HCC): Primary | ICD-10-CM

## 2024-12-16 DIAGNOSIS — I50.32 HEART FAILURE WITH IMPROVED EJECTION FRACTION (HFIMPEF) (HCC): ICD-10-CM

## 2024-12-16 DIAGNOSIS — I50.22 CHRONIC SYSTOLIC CONGESTIVE HEART FAILURE (HCC): ICD-10-CM

## 2024-12-16 PROCEDURE — 3074F SYST BP LT 130 MM HG: CPT | Performed by: SPECIALIST

## 2024-12-16 PROCEDURE — 1123F ACP DISCUSS/DSCN MKR DOCD: CPT | Performed by: SPECIALIST

## 2024-12-16 PROCEDURE — 99215 OFFICE O/P EST HI 40 MIN: CPT | Performed by: SPECIALIST

## 2024-12-16 PROCEDURE — 1159F MED LIST DOCD IN RCRD: CPT | Performed by: SPECIALIST

## 2024-12-16 PROCEDURE — 93010 ELECTROCARDIOGRAM REPORT: CPT | Performed by: SPECIALIST

## 2024-12-16 PROCEDURE — G8484 FLU IMMUNIZE NO ADMIN: HCPCS | Performed by: SPECIALIST

## 2024-12-16 PROCEDURE — G8427 DOCREV CUR MEDS BY ELIG CLIN: HCPCS | Performed by: SPECIALIST

## 2024-12-16 PROCEDURE — 1036F TOBACCO NON-USER: CPT | Performed by: SPECIALIST

## 2024-12-16 PROCEDURE — G8400 PT W/DXA NO RESULTS DOC: HCPCS | Performed by: SPECIALIST

## 2024-12-16 PROCEDURE — 1160F RVW MEDS BY RX/DR IN RCRD: CPT | Performed by: SPECIALIST

## 2024-12-16 PROCEDURE — G8420 CALC BMI NORM PARAMETERS: HCPCS | Performed by: SPECIALIST

## 2024-12-16 PROCEDURE — 93005 ELECTROCARDIOGRAM TRACING: CPT | Performed by: SPECIALIST

## 2024-12-16 PROCEDURE — 3078F DIAST BP <80 MM HG: CPT | Performed by: SPECIALIST

## 2024-12-16 PROCEDURE — 1126F AMNT PAIN NOTED NONE PRSNT: CPT | Performed by: SPECIALIST

## 2024-12-16 PROCEDURE — 1090F PRES/ABSN URINE INCON ASSESS: CPT | Performed by: SPECIALIST

## 2024-12-16 RX ORDER — ALLOPURINOL 100 MG/1
TABLET ORAL
COMMUNITY

## 2024-12-16 RX ORDER — ALENDRONATE SODIUM 70 MG/1
TABLET ORAL
COMMUNITY
Start: 2024-11-17

## 2024-12-16 ASSESSMENT — PATIENT HEALTH QUESTIONNAIRE - PHQ9
SUM OF ALL RESPONSES TO PHQ QUESTIONS 1-9: 0
SUM OF ALL RESPONSES TO PHQ QUESTIONS 1-9: 0
1. LITTLE INTEREST OR PLEASURE IN DOING THINGS: NOT AT ALL
SUM OF ALL RESPONSES TO PHQ9 QUESTIONS 1 & 2: 0
SUM OF ALL RESPONSES TO PHQ QUESTIONS 1-9: 0
2. FEELING DOWN, DEPRESSED OR HOPELESS: NOT AT ALL
SUM OF ALL RESPONSES TO PHQ QUESTIONS 1-9: 0

## 2024-12-16 NOTE — PROGRESS NOTES
[facility]                                     DIVISION OF CARDIOLOGY                                                                             OFFICE NOTE                  KAILYN VAIL M.D. , GERRY            NITA ALONZO   1945  [id number]    Date/Time:  12/16/20243:32 PM      /70 (Site: Left Upper Arm, Position: Sitting, Cuff Size: Large Adult)   Pulse 67   Ht 1.588 m (5' 2.5\")   Wt 61.7 kg (136 lb)   SpO2 95%   BMI 24.48 kg/m²        Wt Readings from Last 3 Encounters:   12/16/24 61.7 kg (136 lb)   12/05/24 62.6 kg (138 lb 1.6 oz)   11/21/24 57.6 kg (127 lb)          Lab Results   Component Value Date    CHOL 118 01/15/2024    TRIG 84 01/15/2024    HDL 26 01/15/2024    VLDL 16.8 01/15/2024    CHOLHDLRATIO 4.5 01/15/2024              SUBJECTIVE:  She seems to be asymptomatic cardiac wise no chest pain or shortness of breath reported no palpitation presyncopal syncopal episodes.  She is able to do her house chores with no issues.       Assessment/Plan    1.  Cardiomyopathy:      Her cardiomyopathy has resolved completely.  She does have a normal echocardiogram in March 2024 and also essentially normal cardiac MRI in March 2024 with a normal ejection fraction and no valvular dysfunction.  This has been discussed with the patient.     She does have some coronary disease but I do not think that was the cause of her cardiomyopathy.  Suspect most likely related to the stress cardiomyopathy or atrial fibrillation flutter with rapid ventricular response.     She seems to be well compensated at this time.     Continue with Toprol-XL 25 mg daily.  Now on lisinopril 2.5 mg daily as per advanced heart failure team.     Continue Jardiance and spironolactone.     Right heart catheterization January 2024 with normal filling pressures and low cardiac index.     Continue above medications    Proceed with echocardiogram next office visit is still normal heart function then we

## 2024-12-31 ENCOUNTER — TELEPHONE (OUTPATIENT)
Age: 79
End: 2024-12-31

## 2024-12-31 NOTE — TELEPHONE ENCOUNTER
Patient left voicemail regarding home sleep study pick-up scheduled for 01/07/2024. Returned patient's call and patient stated she does not want to complete a sleep study at this time, stating it is not a good time for her and she is not ready to complete a sleep study. Informed patient that the order for her sleep study will last for 6-months and to call back when she would like to reschedule.

## 2025-03-28 DIAGNOSIS — I50.32 HEART FAILURE WITH IMPROVED EJECTION FRACTION (HFIMPEF) (HCC): ICD-10-CM

## 2025-03-28 RX ORDER — SPIRONOLACTONE 25 MG/1
12.5 TABLET ORAL DAILY
Qty: 45 TABLET | Refills: 1 | Status: SHIPPED | OUTPATIENT
Start: 2025-03-28

## 2025-03-28 NOTE — TELEPHONE ENCOUNTER
Requested Prescriptions     Signed Prescriptions Disp Refills    spironolactone (ALDACTONE) 25 MG tablet 45 tablet 1     Sig: TAKE 1/2 TABLET BY MOUTH EVERY DAY     Authorizing Provider: ABRAM MCGUIRE     Ordering User: CATIA BARKER     Verbal order per Dr. Mcguire.    Future Appointments   Date Time Provider Department Center   4/7/2025 10:00 AM [facility]   4/14/2025  9:00 AM Abram Mcgurie MD [facility]   4/22/2025 10:40 AM Rosalina Mendes MD [facility]

## 2025-04-10 ENCOUNTER — ANCILLARY PROCEDURE (OUTPATIENT)
Age: 80
End: 2025-04-10
Payer: MEDICARE

## 2025-04-10 VITALS
WEIGHT: 136 LBS | DIASTOLIC BLOOD PRESSURE: 76 MMHG | HEIGHT: 62 IN | SYSTOLIC BLOOD PRESSURE: 120 MMHG | BODY MASS INDEX: 25.03 KG/M2

## 2025-04-10 DIAGNOSIS — I42.8 NICM (NONISCHEMIC CARDIOMYOPATHY) (HCC): ICD-10-CM

## 2025-04-10 DIAGNOSIS — I10 ESSENTIAL (PRIMARY) HYPERTENSION: ICD-10-CM

## 2025-04-10 DIAGNOSIS — I42.0 DILATED CARDIOMYOPATHY (HCC): ICD-10-CM

## 2025-04-10 DIAGNOSIS — I50.32 HEART FAILURE WITH IMPROVED EJECTION FRACTION (HFIMPEF) (HCC): ICD-10-CM

## 2025-04-10 PROCEDURE — 93306 TTE W/DOPPLER COMPLETE: CPT | Performed by: SPECIALIST

## 2025-04-14 ENCOUNTER — OFFICE VISIT (OUTPATIENT)
Age: 80
End: 2025-04-14
Payer: MEDICARE

## 2025-04-14 VITALS
HEART RATE: 57 BPM | SYSTOLIC BLOOD PRESSURE: 110 MMHG | OXYGEN SATURATION: 98 % | RESPIRATION RATE: 16 BRPM | DIASTOLIC BLOOD PRESSURE: 60 MMHG | BODY MASS INDEX: 25.4 KG/M2 | HEIGHT: 62 IN | WEIGHT: 138 LBS

## 2025-04-14 DIAGNOSIS — E78.49 OTHER HYPERLIPIDEMIA: ICD-10-CM

## 2025-04-14 DIAGNOSIS — Z79.01 ANTICOAGULATED: ICD-10-CM

## 2025-04-14 DIAGNOSIS — I47.19 AVNRT (AV NODAL RE-ENTRY TACHYCARDIA): ICD-10-CM

## 2025-04-14 DIAGNOSIS — I42.8 NICM (NONISCHEMIC CARDIOMYOPATHY) (HCC): ICD-10-CM

## 2025-04-14 DIAGNOSIS — I48.3 TYPICAL ATRIAL FLUTTER (HCC): ICD-10-CM

## 2025-04-14 DIAGNOSIS — I42.0 DILATED CARDIOMYOPATHY (HCC): Primary | ICD-10-CM

## 2025-04-14 DIAGNOSIS — I50.22 CHRONIC SYSTOLIC CHF (CONGESTIVE HEART FAILURE), NYHA CLASS 2 (HCC): ICD-10-CM

## 2025-04-14 DIAGNOSIS — I10 ESSENTIAL (PRIMARY) HYPERTENSION: ICD-10-CM

## 2025-04-14 LAB
ECHO AO ASC DIAM: 3.2 CM
ECHO AO ASCENDING AORTA INDEX: 1.98 CM/M2
ECHO AO ROOT DIAM: 3 CM
ECHO AO ROOT INDEX: 1.85 CM/M2
ECHO AV MEAN GRADIENT: 5 MMHG
ECHO AV MEAN VELOCITY: 1.1 M/S
ECHO AV PEAK GRADIENT: 10 MMHG
ECHO AV PEAK VELOCITY: 1.6 M/S
ECHO AV VELOCITY RATIO: 0.69
ECHO AV VTI: 35 CM
ECHO BSA: 1.64 M2
ECHO EST RA PRESSURE: 3 MMHG
ECHO LA DIAMETER INDEX: 1.85 CM/M2
ECHO LA DIAMETER: 3 CM
ECHO LA TO AORTIC ROOT RATIO: 1
ECHO LA VOL A-L A2C: 39 ML (ref 22–52)
ECHO LA VOL A-L A4C: 27 ML (ref 22–52)
ECHO LA VOL BP: 33 ML (ref 22–52)
ECHO LA VOL MOD A2C: 37 ML (ref 22–52)
ECHO LA VOL MOD A4C: 26 ML (ref 22–52)
ECHO LA VOL/BSA BIPLANE: 20 ML/M2 (ref 16–34)
ECHO LA VOLUME AREA LENGTH: 35 ML
ECHO LA VOLUME INDEX A-L A2C: 24 ML/M2 (ref 16–34)
ECHO LA VOLUME INDEX A-L A4C: 17 ML/M2 (ref 16–34)
ECHO LA VOLUME INDEX AREA LENGTH: 22 ML/M2 (ref 16–34)
ECHO LA VOLUME INDEX MOD A2C: 23 ML/M2 (ref 16–34)
ECHO LA VOLUME INDEX MOD A4C: 16 ML/M2 (ref 16–34)
ECHO LV E' LATERAL VELOCITY: 8.06 CM/S
ECHO LV E' SEPTAL VELOCITY: 3.34 CM/S
ECHO LV EDV A2C: 45 ML
ECHO LV EDV A4C: 49 ML
ECHO LV EDV BP: 47 ML (ref 56–104)
ECHO LV EDV INDEX A4C: 30 ML/M2
ECHO LV EDV INDEX BP: 29 ML/M2
ECHO LV EDV NDEX A2C: 28 ML/M2
ECHO LV EF PHYSICIAN: 60 %
ECHO LV EJECTION FRACTION A2C: 71 %
ECHO LV EJECTION FRACTION A4C: 69 %
ECHO LV EJECTION FRACTION BIPLANE: 69 % (ref 55–100)
ECHO LV ESV A2C: 13 ML
ECHO LV ESV A4C: 15 ML
ECHO LV ESV BP: 14 ML (ref 19–49)
ECHO LV ESV INDEX A2C: 8 ML/M2
ECHO LV ESV INDEX A4C: 9 ML/M2
ECHO LV ESV INDEX BP: 9 ML/M2
ECHO LV FRACTIONAL SHORTENING: 37 % (ref 28–44)
ECHO LV INTERNAL DIMENSION DIASTOLE INDEX: 2.53 CM/M2
ECHO LV INTERNAL DIMENSION DIASTOLIC: 4.1 CM (ref 3.9–5.3)
ECHO LV INTERNAL DIMENSION SYSTOLIC INDEX: 1.6 CM/M2
ECHO LV INTERNAL DIMENSION SYSTOLIC: 2.6 CM
ECHO LV IVSD: 1.1 CM (ref 0.6–0.9)
ECHO LV MASS 2D: 151.3 G (ref 67–162)
ECHO LV MASS INDEX 2D: 93.4 G/M2 (ref 43–95)
ECHO LV POSTERIOR WALL DIASTOLIC: 1.1 CM (ref 0.6–0.9)
ECHO LV RELATIVE WALL THICKNESS RATIO: 0.54
ECHO LVOT AV VTI INDEX: 0.68
ECHO LVOT MEAN GRADIENT: 2 MMHG
ECHO LVOT PEAK GRADIENT: 5 MMHG
ECHO LVOT PEAK VELOCITY: 1.1 M/S
ECHO LVOT VTI: 23.7 CM
ECHO MV A VELOCITY: 0.85 M/S
ECHO MV E DECELERATION TIME (DT): 286.5 MS
ECHO MV E VELOCITY: 0.59 M/S
ECHO MV E/A RATIO: 0.69
ECHO MV E/E' LATERAL: 7.32
ECHO MV E/E' RATIO (AVERAGED): 12.49
ECHO MV E/E' SEPTAL: 17.66
ECHO RA AREA 4C: 12.1 CM2
ECHO RA END SYSTOLIC VOLUME APICAL 4 CHAMBER INDEX BSA: 16 ML/M2
ECHO RA VOLUME AREA LENGTH APICAL 4 CHAMBER: 28 ML
ECHO RA VOLUME: 26 ML
ECHO RIGHT VENTRICULAR SYSTOLIC PRESSURE (RVSP): 30 MMHG
ECHO RV BASAL DIMENSION: 3.1 CM
ECHO RV FREE WALL PEAK S': 10.6 CM/S
ECHO RV TAPSE: 2.4 CM (ref 1.7–?)
ECHO TV REGURGITANT MAX VELOCITY: 2.61 M/S
ECHO TV REGURGITANT PEAK GRADIENT: 27 MMHG

## 2025-04-14 PROCEDURE — G8427 DOCREV CUR MEDS BY ELIG CLIN: HCPCS | Performed by: SPECIALIST

## 2025-04-14 PROCEDURE — 99214 OFFICE O/P EST MOD 30 MIN: CPT | Performed by: SPECIALIST

## 2025-04-14 PROCEDURE — 93010 ELECTROCARDIOGRAM REPORT: CPT | Performed by: SPECIALIST

## 2025-04-14 PROCEDURE — 1126F AMNT PAIN NOTED NONE PRSNT: CPT | Performed by: SPECIALIST

## 2025-04-14 PROCEDURE — 3078F DIAST BP <80 MM HG: CPT | Performed by: SPECIALIST

## 2025-04-14 PROCEDURE — 1036F TOBACCO NON-USER: CPT | Performed by: SPECIALIST

## 2025-04-14 PROCEDURE — 1159F MED LIST DOCD IN RCRD: CPT | Performed by: SPECIALIST

## 2025-04-14 PROCEDURE — G8400 PT W/DXA NO RESULTS DOC: HCPCS | Performed by: SPECIALIST

## 2025-04-14 PROCEDURE — 1123F ACP DISCUSS/DSCN MKR DOCD: CPT | Performed by: SPECIALIST

## 2025-04-14 PROCEDURE — G8419 CALC BMI OUT NRM PARAM NOF/U: HCPCS | Performed by: SPECIALIST

## 2025-04-14 PROCEDURE — 1090F PRES/ABSN URINE INCON ASSESS: CPT | Performed by: SPECIALIST

## 2025-04-14 PROCEDURE — 3074F SYST BP LT 130 MM HG: CPT | Performed by: SPECIALIST

## 2025-04-14 PROCEDURE — 93005 ELECTROCARDIOGRAM TRACING: CPT | Performed by: SPECIALIST

## 2025-04-14 ASSESSMENT — PATIENT HEALTH QUESTIONNAIRE - PHQ9
1. LITTLE INTEREST OR PLEASURE IN DOING THINGS: NOT AT ALL
SUM OF ALL RESPONSES TO PHQ QUESTIONS 1-9: 0
2. FEELING DOWN, DEPRESSED OR HOPELESS: NOT AT ALL
SUM OF ALL RESPONSES TO PHQ QUESTIONS 1-9: 0

## 2025-04-14 NOTE — PATIENT INSTRUCTIONS
Follow up in 6 months and have FASTING labs (if not completed by PCP) and a stress test one week prior    You will be scheduled for a Treadmill Stress Echocardiogram Test after your appointment today.    Please wear comfortable clothing (shorts or pants with a shirt or blouse) and walking/athletic shoes.    Do not eat or drink anything, except water, for at least 2 hours prior to your test.    Do not take your Toprol 24 hours prior to your test.

## 2025-04-14 NOTE — PROGRESS NOTES
[facility]                                     DIVISION OF CARDIOLOGY                                                                             OFFICE NOTE                  KAILYN VAIL M.D. , GERRY            NITA ALONZO   1945  [id number]    Date/Time:  4/14/20259:23 AM      /60   Pulse 57   Resp 16   Ht 1.575 m (5' 2\")   Wt 62.6 kg (138 lb)   SpO2 98%   BMI 25.24 kg/m²        Wt Readings from Last 3 Encounters:   04/14/25 62.6 kg (138 lb)   04/10/25 61.7 kg (136 lb)   12/16/24 61.7 kg (136 lb)          Lab Results   Component Value Date    CHOL 118 01/15/2024    TRIG 84 01/15/2024    HDL 26 01/15/2024    VLDL 16.8 01/15/2024    CHOLHDLRATIO 4.5 01/15/2024              SUBJECTIVE:  Doing well she tries to walk every day and she has no symptoms of chest pain or shortness of her palpitations overall she feels well.       Assessment/Plan    1.  Cardiomyopathy:      Her cardiomyopathy has resolved completely.  She does have a normal echocardiogram in March 2024 and also essentially normal cardiac MRI in March 2024 with a normal ejection fraction and no valvular dysfunction.  This has been discussed with the patient.     She does have some coronary disease but I do not think that was the cause of her cardiomyopathy.  Suspect most likely related to the stress cardiomyopathy or atrial fibrillation flutter with rapid ventricular response.     She seems to be well compensated at this time.     Continue with Toprol-XL 25 mg daily.  Now on lisinopril 2.5 mg daily as per advanced heart failure team.     Continue Jardiance     Blood pressure low normal.  Discontinue Aldactone.    Right heart catheterization January 2024 with normal filling pressures and low cardiac index.     Given previous history of cardiomyopathy and coronary disease I will ask her to proceed with stress echocardiogram on the next office visit.      2.  Atrial flutter: Status post ablation off

## 2025-04-14 NOTE — PROGRESS NOTES
1. Have you been to the ER, urgent care clinic since your last visit?  Hospitalized since your last visit?No    2. Have you seen or consulted any other health care providers outside of the [facility] since your last visit?  Include any pap smears or colon screening. No

## 2025-04-21 ENCOUNTER — TELEPHONE (OUTPATIENT)
Age: 80
End: 2025-04-21

## 2025-04-21 NOTE — PROGRESS NOTES
Cardiac Electrophysiology Office Follow-up    NAME: Franine Blake   :  1945  MRM:  [id number]    Date:  2025         Assessment and Plan:     1. Dilated cardiomyopathy (HCC)  -     EKG 12 Lead  2. Essential (primary) hypertension  -     EKG 12 Lead  3. NICM (nonischemic cardiomyopathy) ([facility])  -     EKG 12 Lead  4. Typical atrial flutter ([facility])  -     EKG 12 Lead  5. AVNRT (AV jaciel re-entry tachycardia)  -     EKG 12 Lead  6. Chronic systolic CHF (congestive heart failure), NYHA class 2 ([facility])  -     EKG 12 Lead         Typical AVNRT  History of incessant narrow complex tachycardia, ventricular rate 147 bpm, evidence of negative pseudo-S wave in leads II, III, aVF, pseudo R prime in lead V1, most consistent with typical AVNRT versus orthodromic AVRT.  Recent diagnosis of atrial flutter.  Could have had a separate atrial flutter diagnosis given underlying cardiomyopathy.  Upon further review she does have a history of panic attacks in the past 20 years in which her heart rate races, suggestive of possible undiagnosed SVT.  - Recently was on amiodarone but stopped due to bradycardia  - Given multiple recurrent hospitalization for CHF and symptomatic SVT, patient would best benefit from EP study/SVT ablation preferably as an inpatient  - S/p EPS with refractory incessant typical AVNRT s/p successful ablation ([facility])  - She has done very well since ablation without significant recurrent palpitations or evidence of AVNRT.  No further hospitalization with normalization of LVEF.  - - Echo 4/10/25 with EF 60-65%  - Holter monitor 2024 showed no AF, no bradyarrhythmias or tachyarrhythmias.   - No further indication for LifeVest  - Reassurance was provided  - Obtain 2-week extended Holter monitor in 2 months to assess for any evidence of atrial flutter  - FU with NP in 1 year and with me in 18 months     History chronic systolic congestive heart failure  Etiology most suggestive of tachycardia mediated

## 2025-04-21 NOTE — TELEPHONE ENCOUNTER
Received request from [facility] for recent office note. Faxed to [phone]. Confirmation received.

## 2025-04-22 ENCOUNTER — OFFICE VISIT (OUTPATIENT)
Age: 80
End: 2025-04-22
Payer: MEDICARE

## 2025-04-22 VITALS
SYSTOLIC BLOOD PRESSURE: 108 MMHG | OXYGEN SATURATION: 99 % | HEART RATE: 66 BPM | BODY MASS INDEX: 25.21 KG/M2 | HEIGHT: 62 IN | DIASTOLIC BLOOD PRESSURE: 72 MMHG | WEIGHT: 137 LBS

## 2025-04-22 DIAGNOSIS — I42.0 DILATED CARDIOMYOPATHY (HCC): Primary | ICD-10-CM

## 2025-04-22 DIAGNOSIS — I50.22 CHRONIC SYSTOLIC CHF (CONGESTIVE HEART FAILURE), NYHA CLASS 2 (HCC): ICD-10-CM

## 2025-04-22 DIAGNOSIS — I10 ESSENTIAL (PRIMARY) HYPERTENSION: ICD-10-CM

## 2025-04-22 DIAGNOSIS — I42.8 NICM (NONISCHEMIC CARDIOMYOPATHY) (HCC): ICD-10-CM

## 2025-04-22 DIAGNOSIS — I48.3 TYPICAL ATRIAL FLUTTER (HCC): ICD-10-CM

## 2025-04-22 DIAGNOSIS — I47.19 AVNRT (AV NODAL RE-ENTRY TACHYCARDIA): ICD-10-CM

## 2025-04-22 PROCEDURE — 1123F ACP DISCUSS/DSCN MKR DOCD: CPT | Performed by: INTERNAL MEDICINE

## 2025-04-22 PROCEDURE — 1159F MED LIST DOCD IN RCRD: CPT | Performed by: INTERNAL MEDICINE

## 2025-04-22 PROCEDURE — 99214 OFFICE O/P EST MOD 30 MIN: CPT | Performed by: INTERNAL MEDICINE

## 2025-04-22 PROCEDURE — 93005 ELECTROCARDIOGRAM TRACING: CPT | Performed by: INTERNAL MEDICINE

## 2025-04-22 PROCEDURE — 1160F RVW MEDS BY RX/DR IN RCRD: CPT | Performed by: INTERNAL MEDICINE

## 2025-04-22 PROCEDURE — 3078F DIAST BP <80 MM HG: CPT | Performed by: INTERNAL MEDICINE

## 2025-04-22 PROCEDURE — 1090F PRES/ABSN URINE INCON ASSESS: CPT | Performed by: INTERNAL MEDICINE

## 2025-04-22 PROCEDURE — G8419 CALC BMI OUT NRM PARAM NOF/U: HCPCS | Performed by: INTERNAL MEDICINE

## 2025-04-22 PROCEDURE — 1126F AMNT PAIN NOTED NONE PRSNT: CPT | Performed by: INTERNAL MEDICINE

## 2025-04-22 PROCEDURE — 93010 ELECTROCARDIOGRAM REPORT: CPT | Performed by: INTERNAL MEDICINE

## 2025-04-22 PROCEDURE — G2211 COMPLEX E/M VISIT ADD ON: HCPCS | Performed by: INTERNAL MEDICINE

## 2025-04-22 PROCEDURE — G8427 DOCREV CUR MEDS BY ELIG CLIN: HCPCS | Performed by: INTERNAL MEDICINE

## 2025-04-22 PROCEDURE — G8400 PT W/DXA NO RESULTS DOC: HCPCS | Performed by: INTERNAL MEDICINE

## 2025-04-22 PROCEDURE — 1036F TOBACCO NON-USER: CPT | Performed by: INTERNAL MEDICINE

## 2025-04-22 PROCEDURE — 3074F SYST BP LT 130 MM HG: CPT | Performed by: INTERNAL MEDICINE

## 2025-04-22 ASSESSMENT — PATIENT HEALTH QUESTIONNAIRE - PHQ9
SUM OF ALL RESPONSES TO PHQ QUESTIONS 1-9: 0
2. FEELING DOWN, DEPRESSED OR HOPELESS: NOT AT ALL
1. LITTLE INTEREST OR PLEASURE IN DOING THINGS: NOT AT ALL
SUM OF ALL RESPONSES TO PHQ QUESTIONS 1-9: 0

## 2025-04-22 NOTE — PROGRESS NOTES
1. Have you been to the ER, urgent care clinic since your last visit?  Hospitalized since your last visit?  No    2. Have you seen or consulted any other health care providers outside of the [facility] since your last visit?  Include any pap smears or colon screening.   PCP Dr. Heidy Perez

## 2025-04-22 NOTE — PATIENT INSTRUCTIONS
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 Mendes in 2 years

## 2025-04-25 ENCOUNTER — TELEPHONE (OUTPATIENT)
Age: 80
End: 2025-04-25

## 2025-04-25 DIAGNOSIS — I25.10 CAD (CORONARY ARTERY DISEASE): ICD-10-CM

## 2025-04-25 DIAGNOSIS — E78.49 OTHER HYPERLIPIDEMIA: Primary | ICD-10-CM

## 2025-04-25 DIAGNOSIS — I50.32 HEART FAILURE WITH IMPROVED EJECTION FRACTION (HFIMPEF) (HCC): ICD-10-CM

## 2025-04-25 RX ORDER — LISINOPRIL 2.5 MG/1
2.5 TABLET ORAL DAILY
Qty: 90 TABLET | Refills: 2 | Status: SHIPPED | OUTPATIENT
Start: 2025-04-25

## 2025-04-25 NOTE — TELEPHONE ENCOUNTER
Labs received from [facility]    Collection date 9/17/24    CMP (Stable all within range aside from Creatinine 1.2 and GFR 45.2)    CBC- normal per note    Cholesterol  163  Triglycerides 68  HDL  52  LDL  97.4    Thyroid panel all within range aside from T3 noted low at 0.640

## 2025-04-28 RX ORDER — ATORVASTATIN CALCIUM 20 MG/1
20 TABLET, FILM COATED ORAL DAILY
Qty: 90 TABLET | Refills: 1 | Status: SHIPPED | OUTPATIENT
Start: 2025-04-28

## 2025-04-28 NOTE — TELEPHONE ENCOUNTER
Per Dr. Mcguire:    She does have CAD and my opinion we should try to achieve an LDL level around 55 I feel that we should increase the Lipitor to 20 daily and recheck lipids 8 to 10 weeks after with liver function test but not an emergency and okay she wants to discuss that further with her primary care physician as well     Identifiers x 2. Informed of the above. Verbalized understanding  Requested that I mail letter. Has been unable to access [facility].

## 2025-09-03 ENCOUNTER — TRANSCRIBE ORDERS (OUTPATIENT)
Facility: HOSPITAL | Age: 80
End: 2025-09-03

## 2025-09-03 DIAGNOSIS — Z12.31 SCREENING MAMMOGRAM FOR BREAST CANCER: Primary | ICD-10-CM

## (undated) DEVICE — CABLE EP L150CM RED CONNECTS W/ 4FR SUPREME BPLR QPLR CATH

## (undated) DEVICE — TR BAND RADIAL ARTERY COMPRESSION DEVICE: Brand: TR BAND

## (undated) DEVICE — CATHETER EP CRD 2 10 MM 5 FRX120 CM QPLR 1 MM

## (undated) DEVICE — SPECIAL PROCEDURE DRAPE 32" X 34": Brand: SPECIAL PROCEDURE DRAPE

## (undated) DEVICE — PADPRO DEFIBRILLATION/PACING/CARDIOVERSION/MONITORING ELECTRODES, ADULT/CHILD GREATER THAN 10 KG RADIOTRANSPARENT ELECTRODE, PHYSIO-CONTROL QUIK-COMBO (M) 60" (152 CM): Brand: PADPRO

## (undated) DEVICE — CATHETER ABLAT 7FR L115CM 1-7-4MM SPC TIP 4MM 4 ELECTRD

## (undated) DEVICE — CO-SET DELIVERY SYSTEM FOR 123 ROOM TEMPATURE INJECTATE: Brand: CO-SET+

## (undated) DEVICE — KIT HND CTRL 3 W STPCOCK ROT END 54IN PREM HI PRSS TBNG AT

## (undated) DEVICE — CABLE EP L150CM GRY BPLR QPLR FOR 4FR QPLR CATH SUPREME

## (undated) DEVICE — ANGIOGRAPHY KIT

## (undated) DEVICE — PERCLOSE PROGLIDE™ SUTURE-MEDIATED CLOSURE SYSTEM: Brand: PERCLOSE PROGLIDE™

## (undated) DEVICE — PATCH REF EXT FOR CARTO 3 SYS (EA = 6 PACKS)

## (undated) DEVICE — 3M™ TEGADERM™ TRANSPARENT FILM DRESSING FRAME STYLE, 1626W, 4 IN X 4-3/4 IN (10 CM X 12 CM), 50/CT 4CT/CASE: Brand: 3M™ TEGADERM™

## (undated) DEVICE — KIT MED IMAG CNTRST AGNT W/ IOPAMIDOL REUSE

## (undated) DEVICE — KIT MFLD ISOLATN NACL CNTRST PRT TBNG SPIK W/ PRSS TRNSDUC

## (undated) DEVICE — ASTOUND STANDARD SURGICAL GOWN, XXL: Brand: CONVERTORS

## (undated) DEVICE — PINNACLE INTRODUCER SHEATH: Brand: PINNACLE

## (undated) DEVICE — GLIDESHEATH SLENDER STAINLESS STEEL KIT: Brand: GLIDESHEATH SLENDER

## (undated) DEVICE — CATHETER EP 7FR L115CM 2-8-2MM SPC TIP 2MM 10 ELECTRD F L

## (undated) DEVICE — CATHETER ART THERMODILUTION 6 FRX110 CM 4 LUMEN SWAN

## (undated) DEVICE — PROVE COVER: Brand: UNBRANDED

## (undated) DEVICE — KIT AT-X65 ANGIOTOUCH HAND CONTROLLER

## (undated) DEVICE — CATHETER DIAG L100CM OD5FR ID35MM VASC JUDKINS L PERIPH W O

## (undated) DEVICE — CABLE REPROC RPRCSS 20 POLE A/20 POLE B LGHT BL 34PN DARK BL 34PN C

## (undated) DEVICE — ROYAL SILK SURGICAL GOWN, XL: Brand: CONVERTORS

## (undated) DEVICE — CATHETER ANGIO (ORDER IN MULTIPLES OF 5 EACHS) PIG INFINITI 5FR 110CM 0.038IN STRAIGHT

## (undated) DEVICE — PACK PROCEDURE SURG HRT CATH

## (undated) DEVICE — CABLE CATH L10FT RED PIN CONN 25-34 FOR NAVISTAR CARTO 3

## (undated) DEVICE — CATHETER ANGIO JR4 AD 5 FRX100 CM 25 CM PERFORMA

## (undated) DEVICE — WASTE KIT - ST MARY: Brand: MEDLINE INDUSTRIES, INC.

## (undated) DEVICE — CATHETER EP JSN 10 MM 5 FRX120 CM SUPREME